# Patient Record
Sex: MALE | Race: WHITE | Employment: UNEMPLOYED | URBAN - METROPOLITAN AREA
[De-identification: names, ages, dates, MRNs, and addresses within clinical notes are randomized per-mention and may not be internally consistent; named-entity substitution may affect disease eponyms.]

---

## 2017-03-17 ENCOUNTER — HOSPITAL ENCOUNTER (OUTPATIENT)
Dept: ENDOSCOPY | Age: 57
Discharge: HOME | End: 2017-03-17
Attending: INTERNAL MEDICINE | Admitting: INTERNAL MEDICINE

## 2017-03-17 VITALS
HEIGHT: 70 IN | RESPIRATION RATE: 18 BRPM | SYSTOLIC BLOOD PRESSURE: 127 MMHG | WEIGHT: 231 LBS | DIASTOLIC BLOOD PRESSURE: 92 MMHG | HEART RATE: 94 BPM | OXYGEN SATURATION: 99 % | BODY MASS INDEX: 33.07 KG/M2 | TEMPERATURE: 97.7 F

## 2017-03-17 LAB
GLUCOSE BLD-MCNC: 91 MG/DL (ref 70–99)
PERFORMED ON: NORMAL

## 2017-03-17 RX ORDER — TAMSULOSIN HYDROCHLORIDE 0.4 MG/1
0.4 CAPSULE ORAL 2 TIMES DAILY
COMMUNITY

## 2019-08-09 ENCOUNTER — HOSPITAL ENCOUNTER (OUTPATIENT)
Age: 59
Setting detail: OUTPATIENT SURGERY
Discharge: HOME | End: 2019-08-09
Attending: INTERNAL MEDICINE | Admitting: INTERNAL MEDICINE
Payer: MEDICAID

## 2019-08-09 ENCOUNTER — ANESTHESIA EVENT (OUTPATIENT)
Dept: ENDOSCOPY | Age: 59
End: 2019-08-09
Payer: MEDICAID

## 2019-08-09 ENCOUNTER — ANESTHESIA (OUTPATIENT)
Dept: ENDOSCOPY | Age: 59
End: 2019-08-09
Payer: MEDICAID

## 2019-08-09 VITALS
HEART RATE: 95 BPM | HEIGHT: 70 IN | OXYGEN SATURATION: 100 % | BODY MASS INDEX: 32.64 KG/M2 | DIASTOLIC BLOOD PRESSURE: 73 MMHG | WEIGHT: 228 LBS | TEMPERATURE: 97.7 F | SYSTOLIC BLOOD PRESSURE: 118 MMHG | RESPIRATION RATE: 16 BRPM

## 2019-08-09 VITALS
OXYGEN SATURATION: 97 % | RESPIRATION RATE: 28 BRPM | DIASTOLIC BLOOD PRESSURE: 70 MMHG | SYSTOLIC BLOOD PRESSURE: 100 MMHG

## 2019-08-09 LAB
GLUCOSE BLD-MCNC: 90 MG/DL (ref 70–99)
PERFORMED ON: NORMAL

## 2019-08-09 PROCEDURE — 7100000011 HC PHASE II RECOVERY - ADDTL 15 MIN: Performed by: INTERNAL MEDICINE

## 2019-08-09 PROCEDURE — 2580000003 HC RX 258: Performed by: NURSE ANESTHETIST, CERTIFIED REGISTERED

## 2019-08-09 PROCEDURE — 3609012400 HC EGD TRANSORAL BIOPSY SINGLE/MULTIPLE: Performed by: INTERNAL MEDICINE

## 2019-08-09 PROCEDURE — 2500000003 HC RX 250 WO HCPCS: Performed by: INTERNAL MEDICINE

## 2019-08-09 PROCEDURE — 3700000000 HC ANESTHESIA ATTENDED CARE: Performed by: INTERNAL MEDICINE

## 2019-08-09 PROCEDURE — 3609010300 HC COLONOSCOPY W/BIOPSY SINGLE/MULTIPLE: Performed by: INTERNAL MEDICINE

## 2019-08-09 PROCEDURE — 7100000010 HC PHASE II RECOVERY - FIRST 15 MIN: Performed by: INTERNAL MEDICINE

## 2019-08-09 PROCEDURE — 2709999900 HC NON-CHARGEABLE SUPPLY: Performed by: INTERNAL MEDICINE

## 2019-08-09 PROCEDURE — 88342 IMHCHEM/IMCYTCHM 1ST ANTB: CPT

## 2019-08-09 PROCEDURE — 88305 TISSUE EXAM BY PATHOLOGIST: CPT

## 2019-08-09 PROCEDURE — 3700000001 HC ADD 15 MINUTES (ANESTHESIA): Performed by: INTERNAL MEDICINE

## 2019-08-09 PROCEDURE — 6360000002 HC RX W HCPCS: Performed by: NURSE ANESTHETIST, CERTIFIED REGISTERED

## 2019-08-09 RX ORDER — PROPOFOL 10 MG/ML
INJECTION, EMULSION INTRAVENOUS CONTINUOUS PRN
Status: DISCONTINUED | OUTPATIENT
Start: 2019-08-09 | End: 2019-08-09 | Stop reason: SDUPTHER

## 2019-08-09 RX ORDER — SODIUM CHLORIDE, SODIUM LACTATE, POTASSIUM CHLORIDE, CALCIUM CHLORIDE 600; 310; 30; 20 MG/100ML; MG/100ML; MG/100ML; MG/100ML
INJECTION, SOLUTION INTRAVENOUS CONTINUOUS PRN
Status: DISCONTINUED | OUTPATIENT
Start: 2019-08-09 | End: 2019-08-09 | Stop reason: SDUPTHER

## 2019-08-09 RX ORDER — PROPOFOL 10 MG/ML
INJECTION, EMULSION INTRAVENOUS PRN
Status: DISCONTINUED | OUTPATIENT
Start: 2019-08-09 | End: 2019-08-09 | Stop reason: SDUPTHER

## 2019-08-09 RX ADMIN — PROPOFOL 40 MG: 10 INJECTION, EMULSION INTRAVENOUS at 10:30

## 2019-08-09 RX ADMIN — SODIUM CHLORIDE, SODIUM LACTATE, POTASSIUM CHLORIDE, AND CALCIUM CHLORIDE: 600; 310; 30; 20 INJECTION, SOLUTION INTRAVENOUS at 10:22

## 2019-08-09 RX ADMIN — PROPOFOL 40 MG: 10 INJECTION, EMULSION INTRAVENOUS at 10:27

## 2019-08-09 RX ADMIN — PROPOFOL 40 MG: 10 INJECTION, EMULSION INTRAVENOUS at 10:35

## 2019-08-09 RX ADMIN — PROPOFOL 40 MG: 10 INJECTION, EMULSION INTRAVENOUS at 10:28

## 2019-08-09 RX ADMIN — PROPOFOL 40 MG: 10 INJECTION, EMULSION INTRAVENOUS at 10:32

## 2019-08-09 RX ADMIN — PROPOFOL 100 MCG/KG/MIN: 10 INJECTION, EMULSION INTRAVENOUS at 10:40

## 2019-08-09 ASSESSMENT — PULMONARY FUNCTION TESTS
PIF_VALUE: 0

## 2019-08-09 ASSESSMENT — LIFESTYLE VARIABLES: SMOKING_STATUS: 0

## 2019-08-09 ASSESSMENT — PAIN - FUNCTIONAL ASSESSMENT
PAIN_FUNCTIONAL_ASSESSMENT: 0-10

## 2019-08-09 NOTE — ANESTHESIA PRE PROCEDURE
consumption: 08/07/19    BMI:   Wt Readings from Last 3 Encounters:   08/09/19 228 lb (103.4 kg)   03/17/17 231 lb (104.8 kg)   04/01/16 217 lb (98.4 kg)     Body mass index is 32.71 kg/m². CBC: No results found for: WBC, RBC, HGB, HCT, MCV, RDW, PLT    CMP: No results found for: NA, K, CL, CO2, BUN, CREATININE, GFRAA, AGRATIO, LABGLOM, GLUCOSE, PROT, CALCIUM, BILITOT, ALKPHOS, AST, ALT    POC Tests:   Recent Labs     08/09/19  0754   POCGLU 90       Coags: No results found for: PROTIME, INR, APTT    HCG (If Applicable): No results found for: PREGTESTUR, PREGSERUM, HCG, HCGQUANT     ABGs: No results found for: PHART, PO2ART, GDE1ZGL, BOO4FAB, BEART, P0NTPVRZ     Type & Screen (If Applicable):  No results found for: LABABO, [address]    Anesthesia Evaluation  Patient summary reviewed  Airway: Mallampati: II  TM distance: >3 FB   Neck ROM: full  Mouth opening: > = 3 FB Dental: normal exam         Pulmonary: breath sounds clear to auscultation      (-) COPD, asthma, sleep apnea and not a current smoker                           Cardiovascular:  Exercise tolerance: good (>4 METS),   (+) hypertension:, hyperlipidemia    (-) valvular problems/murmurs, past MI, CAD, CABG/stent, dysrhythmias,  angina,  CHF, orthopnea, PND and  VASQUES      Rhythm: regular  Rate: normal           Beta Blocker:  Not on Beta Blocker         Neuro/Psych:   (+) TIA,    (-) seizures and CVA            ROS comment: TIA 20 years ago- sx lasted 60 sec though could have been a complex migraine , pt states he was about 450 lb at the time GI/Hepatic/Renal:   (+) bowel prep, morbid obesity     (-) GERD, PUD, hepatitis, liver disease and no renal disease      ROS comment: Took omeprazole this am.   Endo/Other:    (+) Diabetes, : arthritis: OA., no malignancy/cancer. (-) hypothyroidism, hyperthyroidism, no malignancy/cancer               Abdominal:           Vascular:     - DVT and PE.                                     Anesthesia Plan      MAC     ASA 2

## 2020-06-29 LAB
BASOPHILS ABSOLUTE: 0 K/UL (ref 0–0.2)
BASOPHILS RELATIVE PERCENT: 0.5 %
C DIFF TOXIN/ANTIGEN: NORMAL
EOSINOPHILS ABSOLUTE: 0.3 K/UL (ref 0–0.6)
EOSINOPHILS RELATIVE PERCENT: 4.3 %
HCT VFR BLD CALC: 39.5 % (ref 40.5–52.5)
HEMOGLOBIN: 13 G/DL (ref 13.5–17.5)
LYMPHOCYTES ABSOLUTE: 1.5 K/UL (ref 1–5.1)
LYMPHOCYTES RELATIVE PERCENT: 21.8 %
MCH RBC QN AUTO: 33.5 PG (ref 26–34)
MCHC RBC AUTO-ENTMCNC: 32.9 G/DL (ref 31–36)
MCV RBC AUTO: 101.7 FL (ref 80–100)
MONOCYTES ABSOLUTE: 0.7 K/UL (ref 0–1.3)
MONOCYTES RELATIVE PERCENT: 9.8 %
NEUTROPHILS ABSOLUTE: 4.3 K/UL (ref 1.7–7.7)
NEUTROPHILS RELATIVE PERCENT: 63.6 %
OCCULT BLOOD DIAGNOSTIC: NORMAL
PDW BLD-RTO: 14.2 % (ref 12.4–15.4)
PLATELET # BLD: 266 K/UL (ref 135–450)
PMV BLD AUTO: 7.5 FL (ref 5–10.5)
RBC # BLD: 3.88 M/UL (ref 4.2–5.9)
WBC # BLD: 6.8 K/UL (ref 4–11)
WHITE BLOOD CELLS (WBC), STOOL: NORMAL

## 2020-06-30 LAB
A/G RATIO: 2.1 (ref 1.1–2.2)
ALBUMIN SERPL-MCNC: 3.9 G/DL (ref 3.4–5)
ALP BLD-CCNC: 151 U/L (ref 40–129)
ALT SERPL-CCNC: 12 U/L (ref 10–40)
ANION GAP SERPL CALCULATED.3IONS-SCNC: 17 MMOL/L (ref 3–16)
AST SERPL-CCNC: 18 U/L (ref 15–37)
BILIRUB SERPL-MCNC: <0.2 MG/DL (ref 0–1)
BUN BLDV-MCNC: 24 MG/DL (ref 7–20)
CALCIUM SERPL-MCNC: 8 MG/DL (ref 8.3–10.6)
CHLORIDE BLD-SCNC: 111 MMOL/L (ref 99–110)
CO2: 16 MMOL/L (ref 21–32)
CREAT SERPL-MCNC: 1.3 MG/DL (ref 0.9–1.3)
CRYPTOSPORIDIUM ANTIGEN STOOL: NORMAL
E HISTOLYTICA ANTIGEN STOOL: NORMAL
GFR AFRICAN AMERICAN: >60
GFR NON-AFRICAN AMERICAN: 56
GI BACTERIAL PATHOGENS BY PCR: NORMAL
GIARDIA ANTIGEN STOOL: NORMAL
GLOBULIN: 1.9 G/DL
GLUCOSE BLD-MCNC: 79 MG/DL (ref 70–99)
POTASSIUM SERPL-SCNC: 5.6 MMOL/L (ref 3.5–5.1)
SODIUM BLD-SCNC: 144 MMOL/L (ref 136–145)
TOTAL PROTEIN: 5.8 G/DL (ref 6.4–8.2)

## 2022-06-02 LAB
BASOPHILS ABSOLUTE: 0 K/UL (ref 0–0.2)
BASOPHILS RELATIVE PERCENT: 0.4 %
EOSINOPHILS ABSOLUTE: 0.1 K/UL (ref 0–0.6)
EOSINOPHILS RELATIVE PERCENT: 1.2 %
HCT VFR BLD CALC: 38.6 % (ref 40.5–52.5)
HEMOGLOBIN: 12.7 G/DL (ref 13.5–17.5)
LYMPHOCYTES ABSOLUTE: 2.1 K/UL (ref 1–5.1)
LYMPHOCYTES RELATIVE PERCENT: 18.6 %
MCH RBC QN AUTO: 33 PG (ref 26–34)
MCHC RBC AUTO-ENTMCNC: 32.8 G/DL (ref 31–36)
MCV RBC AUTO: 100.5 FL (ref 80–100)
MONOCYTES ABSOLUTE: 0.7 K/UL (ref 0–1.3)
MONOCYTES RELATIVE PERCENT: 6.2 %
NEUTROPHILS ABSOLUTE: 8.4 K/UL (ref 1.7–7.7)
NEUTROPHILS RELATIVE PERCENT: 73.6 %
PDW BLD-RTO: 13.5 % (ref 12.4–15.4)
PLATELET # BLD: 417 K/UL (ref 135–450)
PMV BLD AUTO: 6.9 FL (ref 5–10.5)
RBC # BLD: 3.84 M/UL (ref 4.2–5.9)
WBC # BLD: 11.4 K/UL (ref 4–11)

## 2023-01-25 ENCOUNTER — APPOINTMENT (OUTPATIENT)
Dept: CT IMAGING | Age: 63
DRG: 720 | End: 2023-01-25
Payer: MEDICAID

## 2023-01-25 ENCOUNTER — APPOINTMENT (OUTPATIENT)
Dept: GENERAL RADIOLOGY | Age: 63
DRG: 720 | End: 2023-01-25
Payer: MEDICAID

## 2023-01-25 ENCOUNTER — HOSPITAL ENCOUNTER (INPATIENT)
Age: 63
LOS: 10 days | Discharge: LEFT BEFORE DISCHARGE | DRG: 720 | End: 2023-02-04
Attending: EMERGENCY MEDICINE | Admitting: INTERNAL MEDICINE
Payer: MEDICAID

## 2023-01-25 DIAGNOSIS — R65.20 SEPSIS WITH ENCEPHALOPATHY WITHOUT SEPTIC SHOCK, DUE TO UNSPECIFIED ORGANISM (HCC): ICD-10-CM

## 2023-01-25 DIAGNOSIS — G93.40 SEPSIS WITH ENCEPHALOPATHY WITHOUT SEPTIC SHOCK, DUE TO UNSPECIFIED ORGANISM (HCC): ICD-10-CM

## 2023-01-25 DIAGNOSIS — R41.0 DELIRIUM: Primary | ICD-10-CM

## 2023-01-25 DIAGNOSIS — A41.9 SEPSIS WITH ENCEPHALOPATHY WITHOUT SEPTIC SHOCK, DUE TO UNSPECIFIED ORGANISM (HCC): ICD-10-CM

## 2023-01-25 DIAGNOSIS — N39.0 URINARY TRACT INFECTION WITHOUT HEMATURIA, SITE UNSPECIFIED: ICD-10-CM

## 2023-01-25 LAB
ACETAMINOPHEN LEVEL: <5 UG/ML (ref 10–30)
ALBUMIN SERPL-MCNC: 1.7 G/DL (ref 3.4–5)
ALP BLD-CCNC: 275 U/L (ref 40–129)
ALT SERPL-CCNC: 63 U/L (ref 10–40)
AMMONIA: 92 UMOL/L (ref 16–60)
AMPHETAMINE SCREEN, URINE: ABNORMAL
ANION GAP SERPL CALCULATED.3IONS-SCNC: 13 MMOL/L (ref 3–16)
AST SERPL-CCNC: 84 U/L (ref 15–37)
BACTERIA: ABNORMAL /HPF
BARBITURATE SCREEN URINE: ABNORMAL
BASE EXCESS VENOUS: -0.8 MMOL/L (ref -2–3)
BASOPHILS ABSOLUTE: 0 K/UL (ref 0–0.2)
BASOPHILS RELATIVE PERCENT: 0.2 %
BENZODIAZEPINE SCREEN, URINE: ABNORMAL
BILIRUB SERPL-MCNC: 1.5 MG/DL (ref 0–1)
BILIRUBIN DIRECT: 1 MG/DL (ref 0–0.3)
BILIRUBIN URINE: ABNORMAL
BILIRUBIN, INDIRECT: 0.5 MG/DL (ref 0–1)
BLOOD, URINE: ABNORMAL
BUN BLDV-MCNC: 32 MG/DL (ref 7–20)
CALCIUM SERPL-MCNC: 7 MG/DL (ref 8.3–10.6)
CANNABINOID SCREEN URINE: ABNORMAL
CARBOXYHEMOGLOBIN: 0.7 % (ref 0–1.5)
CHLORIDE BLD-SCNC: 102 MMOL/L (ref 99–110)
CLARITY: CLEAR
CO2: 21 MMOL/L (ref 21–32)
COCAINE METABOLITE SCREEN URINE: ABNORMAL
COLOR: YELLOW
CREAT SERPL-MCNC: 1.8 MG/DL (ref 0.8–1.3)
EOSINOPHILS ABSOLUTE: 0 K/UL (ref 0–0.6)
EOSINOPHILS RELATIVE PERCENT: 0 %
EPITHELIAL CELLS, UA: ABNORMAL /HPF (ref 0–5)
ETHANOL: NORMAL MG/DL (ref 0–0.08)
FENTANYL SCREEN, URINE: ABNORMAL
FOLATE: 18.53 NG/ML (ref 4.78–24.2)
GFR SERPL CREATININE-BSD FRML MDRD: 42 ML/MIN/{1.73_M2}
GLUCOSE BLD-MCNC: 84 MG/DL (ref 70–99)
GLUCOSE URINE: NEGATIVE MG/DL
HAV IGM SER IA-ACNC: NORMAL
HCO3 VENOUS: 23 MMOL/L (ref 24–28)
HCT VFR BLD CALC: 27.6 % (ref 40.5–52.5)
HEMOGLOBIN, VEN, REDUCED: 15.5 %
HEMOGLOBIN: 8.8 G/DL (ref 13.5–17.5)
HEPATITIS B CORE IGM ANTIBODY: NORMAL
HEPATITIS B SURFACE ANTIGEN INTERPRETATION: NORMAL
HEPATITIS C ANTIBODY INTERPRETATION: NORMAL
INR BLD: 2.03 (ref 0.87–1.14)
KETONES, URINE: ABNORMAL MG/DL
LACTIC ACID: 1.4 MMOL/L (ref 0.4–2)
LACTIC ACID: 2.4 MMOL/L (ref 0.4–2)
LEUKOCYTE ESTERASE, URINE: ABNORMAL
LIPASE: 4 U/L (ref 13–60)
LV EF: 63 %
LVEF MODALITY: NORMAL
LYMPHOCYTES ABSOLUTE: 2.5 K/UL (ref 1–5.1)
LYMPHOCYTES RELATIVE PERCENT: 14.1 %
Lab: ABNORMAL
MCH RBC QN AUTO: 32.4 PG (ref 26–34)
MCHC RBC AUTO-ENTMCNC: 31.9 G/DL (ref 31–36)
MCV RBC AUTO: 101.5 FL (ref 80–100)
METHADONE SCREEN, URINE: ABNORMAL
METHEMOGLOBIN VENOUS: 2.2 % (ref 0–1.5)
MICROSCOPIC EXAMINATION: YES
MONOCYTES ABSOLUTE: 0.8 K/UL (ref 0–1.3)
MONOCYTES RELATIVE PERCENT: 4.3 %
NEUTROPHILS ABSOLUTE: 14.6 K/UL (ref 1.7–7.7)
NEUTROPHILS RELATIVE PERCENT: 81.4 %
NITRITE, URINE: NEGATIVE
O2 SAT, VEN: 84 %
OPIATE SCREEN URINE: ABNORMAL
OXYCODONE URINE: POSITIVE
PCO2, VEN: 33.6 MMHG (ref 41–51)
PDW BLD-RTO: 14.7 % (ref 12.4–15.4)
PH UA: 6
PH UA: 6 (ref 5–8)
PH VENOUS: 7.44 (ref 7.35–7.45)
PHENCYCLIDINE SCREEN URINE: ABNORMAL
PLATELET # BLD: 310 K/UL (ref 135–450)
PMV BLD AUTO: 7.9 FL (ref 5–10.5)
PO2, VEN: 48.8 MMHG (ref 25–40)
POTASSIUM REFLEX MAGNESIUM: 3.7 MMOL/L (ref 3.5–5.1)
PRO-BNP: 520 PG/ML (ref 0–124)
PROCALCITONIN: 43.88 NG/ML (ref 0–0.15)
PROTEIN UA: 30 MG/DL
PROTHROMBIN TIME: 23 SEC (ref 11.7–14.5)
RBC # BLD: 2.72 M/UL (ref 4.2–5.9)
RBC UA: ABNORMAL /HPF (ref 0–4)
RENAL EPITHELIAL, UA: ABNORMAL /HPF (ref 0–1)
SALICYLATE, SERUM: <0.3 MG/DL (ref 15–30)
SODIUM BLD-SCNC: 136 MMOL/L (ref 136–145)
SPECIFIC GRAVITY UA: 1.02 (ref 1–1.03)
TCO2 CALC VENOUS: 24 MMOL/L
TOTAL PROTEIN: 4.3 G/DL (ref 6.4–8.2)
TROPONIN: 0.13 NG/ML
TROPONIN: 0.13 NG/ML
URINE REFLEX TO CULTURE: YES
URINE TYPE: ABNORMAL
UROBILINOGEN, URINE: 1 E.U./DL
VITAMIN B-12: >2000 PG/ML (ref 211–911)
WBC # BLD: 18 K/UL (ref 4–11)
WBC UA: >100 /HPF (ref 0–5)

## 2023-01-25 PROCEDURE — 6360000002 HC RX W HCPCS: Performed by: STUDENT IN AN ORGANIZED HEALTH CARE EDUCATION/TRAINING PROGRAM

## 2023-01-25 PROCEDURE — 6360000002 HC RX W HCPCS: Performed by: EMERGENCY MEDICINE

## 2023-01-25 PROCEDURE — 93005 ELECTROCARDIOGRAM TRACING: CPT

## 2023-01-25 PROCEDURE — 83690 ASSAY OF LIPASE: CPT

## 2023-01-25 PROCEDURE — 83605 ASSAY OF LACTIC ACID: CPT

## 2023-01-25 PROCEDURE — 71045 X-RAY EXAM CHEST 1 VIEW: CPT

## 2023-01-25 PROCEDURE — 82746 ASSAY OF FOLIC ACID SERUM: CPT

## 2023-01-25 PROCEDURE — 2700000000 HC OXYGEN THERAPY PER DAY

## 2023-01-25 PROCEDURE — 82140 ASSAY OF AMMONIA: CPT

## 2023-01-25 PROCEDURE — 36415 COLL VENOUS BLD VENIPUNCTURE: CPT

## 2023-01-25 PROCEDURE — 80074 ACUTE HEPATITIS PANEL: CPT

## 2023-01-25 PROCEDURE — 96365 THER/PROPH/DIAG IV INF INIT: CPT

## 2023-01-25 PROCEDURE — 82607 VITAMIN B-12: CPT

## 2023-01-25 PROCEDURE — 6360000002 HC RX W HCPCS: Performed by: INTERNAL MEDICINE

## 2023-01-25 PROCEDURE — 87088 URINE BACTERIA CULTURE: CPT

## 2023-01-25 PROCEDURE — 86038 ANTINUCLEAR ANTIBODIES: CPT

## 2023-01-25 PROCEDURE — 2500000003 HC RX 250 WO HCPCS: Performed by: STUDENT IN AN ORGANIZED HEALTH CARE EDUCATION/TRAINING PROGRAM

## 2023-01-25 PROCEDURE — 84145 PROCALCITONIN (PCT): CPT

## 2023-01-25 PROCEDURE — 85025 COMPLETE CBC W/AUTO DIFF WBC: CPT

## 2023-01-25 PROCEDURE — 99285 EMERGENCY DEPT VISIT HI MDM: CPT

## 2023-01-25 PROCEDURE — 86015 ACTIN ANTIBODY EACH: CPT

## 2023-01-25 PROCEDURE — 80076 HEPATIC FUNCTION PANEL: CPT

## 2023-01-25 PROCEDURE — 80307 DRUG TEST PRSMV CHEM ANLYZR: CPT

## 2023-01-25 PROCEDURE — 82077 ASSAY SPEC XCP UR&BREATH IA: CPT

## 2023-01-25 PROCEDURE — 99223 1ST HOSP IP/OBS HIGH 75: CPT | Performed by: INTERNAL MEDICINE

## 2023-01-25 PROCEDURE — 2580000003 HC RX 258: Performed by: INTERNAL MEDICINE

## 2023-01-25 PROCEDURE — 70450 CT HEAD/BRAIN W/O DYE: CPT

## 2023-01-25 PROCEDURE — A4216 STERILE WATER/SALINE, 10 ML: HCPCS

## 2023-01-25 PROCEDURE — 94761 N-INVAS EAR/PLS OXIMETRY MLT: CPT

## 2023-01-25 PROCEDURE — 84484 ASSAY OF TROPONIN QUANT: CPT

## 2023-01-25 PROCEDURE — 87040 BLOOD CULTURE FOR BACTERIA: CPT

## 2023-01-25 PROCEDURE — 87086 URINE CULTURE/COLONY COUNT: CPT

## 2023-01-25 PROCEDURE — 93306 TTE W/DOPPLER COMPLETE: CPT

## 2023-01-25 PROCEDURE — 80143 DRUG ASSAY ACETAMINOPHEN: CPT

## 2023-01-25 PROCEDURE — 81001 URINALYSIS AUTO W/SCOPE: CPT

## 2023-01-25 PROCEDURE — 2580000003 HC RX 258: Performed by: EMERGENCY MEDICINE

## 2023-01-25 PROCEDURE — 2000000000 HC ICU R&B

## 2023-01-25 PROCEDURE — 80179 DRUG ASSAY SALICYLATE: CPT

## 2023-01-25 PROCEDURE — 2580000003 HC RX 258

## 2023-01-25 PROCEDURE — 2500000003 HC RX 250 WO HCPCS

## 2023-01-25 PROCEDURE — 87150 DNA/RNA AMPLIFIED PROBE: CPT

## 2023-01-25 PROCEDURE — 85610 PROTHROMBIN TIME: CPT

## 2023-01-25 PROCEDURE — 82803 BLOOD GASES ANY COMBINATION: CPT

## 2023-01-25 PROCEDURE — 2580000003 HC RX 258: Performed by: STUDENT IN AN ORGANIZED HEALTH CARE EDUCATION/TRAINING PROGRAM

## 2023-01-25 PROCEDURE — 87186 SC STD MICRODIL/AGAR DIL: CPT

## 2023-01-25 PROCEDURE — 83880 ASSAY OF NATRIURETIC PEPTIDE: CPT

## 2023-01-25 PROCEDURE — 51702 INSERT TEMP BLADDER CATH: CPT

## 2023-01-25 PROCEDURE — 96375 TX/PRO/DX INJ NEW DRUG ADDON: CPT

## 2023-01-25 PROCEDURE — 80048 BASIC METABOLIC PNL TOTAL CA: CPT

## 2023-01-25 RX ORDER — SODIUM CHLORIDE 9 MG/ML
INJECTION, SOLUTION INTRAVENOUS PRN
Status: DISCONTINUED | OUTPATIENT
Start: 2023-01-25 | End: 2023-02-04 | Stop reason: HOSPADM

## 2023-01-25 RX ORDER — POLYETHYLENE GLYCOL 3350 17 G/17G
17 POWDER, FOR SOLUTION ORAL DAILY PRN
Status: DISCONTINUED | OUTPATIENT
Start: 2023-01-25 | End: 2023-02-04 | Stop reason: HOSPADM

## 2023-01-25 RX ORDER — NALOXONE HYDROCHLORIDE 0.4 MG/ML
0.4 INJECTION, SOLUTION INTRAMUSCULAR; INTRAVENOUS; SUBCUTANEOUS ONCE
Status: COMPLETED | OUTPATIENT
Start: 2023-01-25 | End: 2023-01-25

## 2023-01-25 RX ORDER — FUROSEMIDE 10 MG/ML
20 INJECTION INTRAMUSCULAR; INTRAVENOUS ONCE
Status: DISCONTINUED | OUTPATIENT
Start: 2023-01-25 | End: 2023-01-25

## 2023-01-25 RX ORDER — TIZANIDINE 4 MG/1
4 TABLET ORAL EVERY 8 HOURS PRN
COMMUNITY

## 2023-01-25 RX ORDER — DOXEPIN HYDROCHLORIDE 50 MG/1
150 CAPSULE ORAL NIGHTLY
COMMUNITY

## 2023-01-25 RX ORDER — ACETAMINOPHEN 325 MG/1
650 TABLET ORAL EVERY 6 HOURS PRN
Status: DISCONTINUED | OUTPATIENT
Start: 2023-01-25 | End: 2023-02-04 | Stop reason: HOSPADM

## 2023-01-25 RX ORDER — SODIUM CHLORIDE, SODIUM LACTATE, POTASSIUM CHLORIDE, AND CALCIUM CHLORIDE .6; .31; .03; .02 G/100ML; G/100ML; G/100ML; G/100ML
1000 INJECTION, SOLUTION INTRAVENOUS ONCE
Status: COMPLETED | OUTPATIENT
Start: 2023-01-25 | End: 2023-01-25

## 2023-01-25 RX ORDER — ACETAMINOPHEN 650 MG/1
650 SUPPOSITORY RECTAL EVERY 6 HOURS PRN
Status: DISCONTINUED | OUTPATIENT
Start: 2023-01-25 | End: 2023-02-04 | Stop reason: HOSPADM

## 2023-01-25 RX ORDER — ONDANSETRON 4 MG/1
4 TABLET, ORALLY DISINTEGRATING ORAL EVERY 8 HOURS PRN
Status: DISCONTINUED | OUTPATIENT
Start: 2023-01-25 | End: 2023-01-26 | Stop reason: SDUPTHER

## 2023-01-25 RX ORDER — SODIUM CHLORIDE 0.9 % (FLUSH) 0.9 %
5-40 SYRINGE (ML) INJECTION PRN
Status: DISCONTINUED | OUTPATIENT
Start: 2023-01-25 | End: 2023-02-04 | Stop reason: HOSPADM

## 2023-01-25 RX ORDER — SODIUM CHLORIDE, SODIUM LACTATE, POTASSIUM CHLORIDE, AND CALCIUM CHLORIDE .6; .31; .03; .02 G/100ML; G/100ML; G/100ML; G/100ML
1391 INJECTION, SOLUTION INTRAVENOUS ONCE
Status: COMPLETED | OUTPATIENT
Start: 2023-01-25 | End: 2023-01-25

## 2023-01-25 RX ORDER — SODIUM CHLORIDE 9 MG/ML
INJECTION, SOLUTION INTRAVENOUS CONTINUOUS
Status: DISCONTINUED | OUTPATIENT
Start: 2023-01-25 | End: 2023-01-25

## 2023-01-25 RX ORDER — ONDANSETRON 2 MG/ML
4 INJECTION INTRAMUSCULAR; INTRAVENOUS EVERY 6 HOURS PRN
Status: DISCONTINUED | OUTPATIENT
Start: 2023-01-25 | End: 2023-01-26 | Stop reason: SDUPTHER

## 2023-01-25 RX ORDER — CLONAZEPAM 1 MG/1
1 TABLET ORAL 2 TIMES DAILY PRN
COMMUNITY

## 2023-01-25 RX ORDER — PANTOPRAZOLE SODIUM 40 MG/1
40 TABLET, DELAYED RELEASE ORAL DAILY
COMMUNITY

## 2023-01-25 RX ORDER — LATANOPROST 50 UG/ML
1 SOLUTION/ DROPS OPHTHALMIC NIGHTLY
COMMUNITY
Start: 2022-11-06

## 2023-01-25 RX ORDER — FUROSEMIDE 20 MG/1
20 TABLET ORAL DAILY
COMMUNITY
Start: 2023-01-04

## 2023-01-25 RX ORDER — SODIUM CHLORIDE 0.9 % (FLUSH) 0.9 %
5-40 SYRINGE (ML) INJECTION EVERY 12 HOURS SCHEDULED
Status: DISCONTINUED | OUTPATIENT
Start: 2023-01-25 | End: 2023-02-04 | Stop reason: HOSPADM

## 2023-01-25 RX ADMIN — MEROPENEM 1000 MG: 1 INJECTION, POWDER, FOR SOLUTION INTRAVENOUS at 16:54

## 2023-01-25 RX ADMIN — SODIUM CHLORIDE, POTASSIUM CHLORIDE, SODIUM LACTATE AND CALCIUM CHLORIDE 1391 ML: 600; 310; 30; 20 INJECTION, SOLUTION INTRAVENOUS at 14:00

## 2023-01-25 RX ADMIN — NOREPINEPHRINE BITARTRATE 5 MCG/MIN: 1 INJECTION, SOLUTION, CONCENTRATE INTRAVENOUS at 19:22

## 2023-01-25 RX ADMIN — SODIUM CHLORIDE: 9 INJECTION, SOLUTION INTRAVENOUS at 16:50

## 2023-01-25 RX ADMIN — NALOXONE HYDROCHLORIDE 0.4 MG: 0.4 INJECTION, SOLUTION INTRAMUSCULAR; INTRAVENOUS; SUBCUTANEOUS at 14:21

## 2023-01-25 RX ADMIN — VANCOMYCIN HYDROCHLORIDE 2000 MG: 10 INJECTION, POWDER, LYOPHILIZED, FOR SOLUTION INTRAVENOUS at 14:03

## 2023-01-25 RX ADMIN — SODIUM CHLORIDE, POTASSIUM CHLORIDE, SODIUM LACTATE AND CALCIUM CHLORIDE 1000 ML: 600; 310; 30; 20 INJECTION, SOLUTION INTRAVENOUS at 12:49

## 2023-01-25 RX ADMIN — PIPERACILLIN AND TAZOBACTAM 4500 MG: 4; .5 INJECTION, POWDER, LYOPHILIZED, FOR SOLUTION INTRAVENOUS at 13:49

## 2023-01-25 RX ADMIN — SODIUM CHLORIDE, PRESERVATIVE FREE 20 MG: 5 INJECTION INTRAVENOUS at 16:52

## 2023-01-25 RX ADMIN — AZITHROMYCIN DIHYDRATE 500 MG: 500 INJECTION, POWDER, LYOPHILIZED, FOR SOLUTION INTRAVENOUS at 21:01

## 2023-01-25 NOTE — PROGRESS NOTES
Clinical Pharmacy Progress Note    Vancomycin - Management by Pharmacy    Consult Date(s): 1/25/23  Consulting Provider(s): Dr.Alexander Washburn    Assessment / Plan  Sepsis of unknown - Vancomycin  Concurrent Antimicrobials: Merrem  Day of Vanc Therapy / Ordered Duration: 7 days  Current Dosing Method: Bayesian-Guided AUC Dosing  Therapeutic Goal: -600 mg/L*hr  Current Dose / Plan:   2000 mg LD, 1250 mg every 24 hours  Estimated AUC of 560mg/L.hr   Estimated trough of 17.6mg/L  Will continue to monitor clinical condition and make adjustments to regimen as appropriate. Thank you for consulting Pharmacy! Joseph Graves, PharmD  Main Pharmacy: [id number]        Subjective/Objective:   Leonila Sanchez is a 58 y.o. male who is admitted with Sepsis . Pharmacy is consulted to dose vancomycin. Ht Readings from Last 1 Encounters:   01/25/23 6' (1.829 m)     Wt Readings from Last 1 Encounters:   01/25/23 175 lb 11.3 oz (79.7 kg)     Current & Prior Antimicrobial Regimen(s):  Merrem     Level(s) / Doses:    Date Time Dose Type of Level / Level Interpretation                 Note: Serum levels collected for AUC-based dosing may be high if collected in close proximity to the dose administered. This is not necessarily indicative of toxicity. Cultures & Sensitivities:    Date Site Micro Susceptibility / Result                 Recent Labs     01/25/23  1258   CREATININE 1.8*   BUN 32*   WBC 18.0*       Estimated Creatinine Clearance: 47 mL/min (A) (based on SCr of 1.8 mg/dL (H)). Additional Lab Values / Findings of Note:    No results for input(s): PROCAL in the last 72 hours.

## 2023-01-25 NOTE — PROGRESS NOTES
4 Eyes Admission Assessment     I agree as the admission nurse that 2 RN's have performed a thorough Head to Toe Skin Assessment on the patient. ALL assessment sites listed below have been assessed on admission. Areas assessed by both nurses: Jeffry Crooked and [employer]   [x]   Head, Face, and Ears   [x]   Shoulders, Back, and Chest  [x]   Arms, Elbows, and Hands   [x]   Coccyx, Sacrum, and Ischium  [x]   Legs, Feet, and Heels        Does the Patient have Skin Breakdown? Yes a wound was noted on the Admission Assessment and an LDA was Initiated documentation include the Bridgett-wound, Wound Assessment, Measurements, Dressing Treatment, Drainage, and Color\",       Pt admitted with multiple wounds. Bilateral wounds which or necrotic to unstageable on feet, scattered abrasions and tears to BLE and BUE, discolration to left flank and hip. Stage 4 on coccyx.      Matthew Prevention initiated:  Yes   Wound Care Orders initiated:  Yes      [id number]  nurse consulted for Pressure Injury (Stage 3,4, Unstageable, DTI, NWPT, and Complex wounds) or Matthew score 18 or lower:  Yes      Nurse 1 eSignature: Electronically signed by Vitaly Rinaldi RN on 1/25/23 at 3:20 PM EST    **SHARE this note so that the co-signing nurse is able to place an eSignature**    Nurse 2 eSignature: Antoinette Garrett RN

## 2023-01-25 NOTE — H&P
ICU HISTORY AND PHYSICAL       Hospital Day: 1  ICU Day: 1                                                         Code:No Order  Admit Date: 1/25/2023  PCP: PENELOPE CLARK MD                                  CC: AMS    HISTORY OF PRESENT ILLNESS:   Mr. Mcnally is a 62 y.o. M with PMH of chronic back pain, pulmonary emboli on eliquis, CKD, gallstones, DM, HTN, HLD, TIA, stroke, pancreatitis, gastric bypass surgery who presents with altered mental status from home. He was last known normal 13 hours prior to admission (11 PM 1/25). Pt. Cannot contribute history. Pt. Was found in home by person in house who found him.    I spoke with patient's  and niece, who are both very close with him.  They state that the patient should be full code however patient has noted in the past that he does not want to be intubated for any extended period of time.   states the patient does not drink alcohol, does not smoke, does not do any illicit drugs.  Patient's  and niece both state that the patient has been declining significantly in the past 6 months.  His memory has started to worsen as well as his ability for activities of daily living.    At  in the end of Nov both blood Cx show staph epidermidis susceptible to vanc and bactrim.  Urine Cx + for E. Coli and susceptible to multiple mediations. During same visit, the patient was found to have UTI, L lower extremity cellulitis, end organ damage shock and sacral decubitus ulcer.     Per chart review, Tizanidine, Percocet, Gabapentin are all prescribed for low back pain. Per note from 1/19 at  health; pain is worse and he wants to decrease the dose of meds so that the patient can take it more frequently. Pt. Also takes Seroquel, clonopin, and doxepin. Pt. Also takes tamsulosin for BPH.    In the ED vanc and zosyn were given. Narcan was also given. Pending acetaminophen and salicylate levels. During same visit, the patient was found to have UTI, L lower extremity  cellulitis, end organ damage shock and sacral decubitus ulcer. PAST HISTORY:     Past Medical History:   Diagnosis Date    Bradycardia     Depression     Diabetes mellitus ([facility] [address]     no meds    Hyperlipidemia     Hypertension     Osteoarthritis of lumbar spine     Primary osteoarthritis of lumbar spine     Unspecified cerebral artery occlusion with cerebral infarction 2003    TIA    Unspecified cerebral artery occlusion with cerebral infarction        Past Surgical History:   Procedure Laterality Date    BACK SURGERY      COLONOSCOPY N/A 8/9/2019    COLONOSCOPY WITH BIOPSY performed by Albania Zhao MD at [facility],  [address]      left knee    TONSILLECTOMY      UPPER GASTROINTESTINAL ENDOSCOPY N/A 8/9/2019    EGD BIOPSY performed by Albania Zhao MD at [facility]:   The patient lives at    Alcohol:  Illicit drugs: no use  Tobacco:      Family History:  No family history on file. MEDICATIONS:     No current facility-administered medications on file prior to encounter. Current Outpatient Medications on File Prior to Encounter   Medication Sig Dispense Refill    apixaban (ELIQUIS) 5 MG TABS tablet Take 5 mg by mouth 2 times daily      clonazePAM (KLONOPIN) 1 MG tablet Take 1 mg by mouth 2 times daily as needed. tamsulosin (FLOMAX) 0.4 MG capsule Take 0.4 mg by mouth 2 times daily      losartan (COZAAR) 50 MG tablet Take 50 mg by mouth daily. senna (SENOKOT) 8.6 MG tablet Take 1 tablet by mouth as needed for Constipation. fluticasone (FLONASE) 50 MCG/ACT nasal spray 1 spray by Nasal route daily. docusate sodium (COLACE) 100 MG capsule Take 100 mg by mouth.      gabapentin (NEURONTIN) 600 MG tablet Take 600 mg by mouth 3 times daily. oxyCODONE-acetaminophen (PERCOCET) 7.5-325 MG per tablet Take 1 tablet by mouth every 12 hours as needed for Pain. spironolactone (ALDACTONE) 25 MG tablet Take 25 mg by mouth daily. Magnesium 500 MG CAPS Take  by mouth daily. metoprolol (TOPROL-XL) 100 MG XL tablet Take 100 mg by mouth daily. omeprazole (PRILOSEC) 40 MG capsule Take 40 mg by mouth daily. zinc 50 MG CAPS Take  by mouth daily. pravastatin (PRAVACHOL) 10 MG tablet Take 10 mg by mouth daily. folic acid (FOLVITE) 1 MG tablet Take 1 mg by mouth daily. montelukast (SINGULAIR) 10 MG tablet Take 10 mg by mouth nightly. loratadine (CLARITIN) 10 MG tablet Take 10 mg by mouth daily. therapeutic multivitamin-minerals (THERAGRAN-M) tablet Take 1 tablet by mouth daily. Scheduled Meds:   piperacillin-tazobactam  4,500 mg IntraVENous Once    vancomycin  25 mg/kg IntraVENous Once    lactated ringers bolus  1,391 mL IntraVENous Once      Continuous Infusions:  PRN Meds:    Allergies: No Known Allergies    REVIEW OF SYSTEMS:       History obtained from chart review and the patient    Review of Systems   Reason unable to perform ROS: Poor mentation. PHYSICAL EXAM:       Vitals: BP (!) 99/59   Pulse (!) 143   Resp 25   Ht 6' (1.829 m)   Wt 175 lb 11.3 oz (79.7 kg)   SpO2 100%   BMI 23.83 kg/m²     I/O:  No intake or output data in the 24 hours ending 01/25/23 1414  No intake/output data recorded. No intake/output data recorded. Physical Examination:     Physical Exam  Constitutional:       Appearance: He is ill-appearing and toxic-appearing. Comments: Appears Grossly jaundiced   HENT:      Head: Normocephalic. Right Ear: External ear normal.      Left Ear: External ear normal.   Cardiovascular:      Rate and Rhythm: Regular rhythm. Tachycardia present. Pulses: Normal pulses. Heart sounds: Normal heart sounds. Pulmonary:      Breath sounds: Normal breath sounds. Abdominal:      General: There is distension. Palpations: Abdomen is soft. Tenderness: There is no abdominal tenderness. There is no guarding.    Musculoskeletal:         General: Swelling and signs of injury present. Right lower leg: Edema present. Left lower leg: Edema present. Comments: Anasarca  Sacral Decubitis Ulcer R buttock   Skin:     Coloration: Skin is jaundiced. Findings: Bruising and lesion present. Comments: Skin lesions on both feet with peeling skin, skin lesion L knee, surgery scars on lower abdomen. Neurological:      Comments: Minimally responsive         Access:   -Central Access Day #:  0                                   -Peripheral Access Day#:1  -Arterial line Day#: 0                                   Joseph Day#:1  NGT Day#:  0                                             ETT Day#: 0  Vent Settings:    / / /     No results for input(s): PHART, CPJ0AMV, PO2ART in the last 72 hours. DATA:       Labs:  CBC:   Recent Labs     01/25/23  1258   WBC 18.0*   HGB 8.8*   HCT 27.6*          BMP:   Recent Labs     01/25/23  1258      K 3.7      CO2 21   BUN 32*   CREATININE 1.8*   GLUCOSE 84     LFT's:   Recent Labs     01/25/23  1258   AST 84*   ALT 63*   BILITOT 1.5*   ALKPHOS 275*     Troponin:   Recent Labs     01/25/23  1258   TROPONINI 0.13*     BNP:No results for input(s): BNP in the last 72 hours. ABGs: No results for input(s): PHART, OXQ8ZQL, PO2ART in the last 72 hours. INR:   Recent Labs     01/25/23  1258   INR 2.03*       U/A:  Recent Labs     01/25/23  1316   COLORU Yellow   PHUR 6.0  6.0   WBCUA >100*   RBCUA 0-2   BACTERIA 3+*   CLARITYU Clear   SPECGRAV 1.025   LEUKOCYTESUR MODERATE*   UROBILINOGEN 1.0   BILIRUBINUR SMALL*   BLOODU MODERATE*   GLUCOSEU Negative       CT Head W/O Contrast   Final Result      No evidence of acute intracranial abnormality. XR CHEST PORTABLE   Final Result   1. Abnormal airspace disease throughout the left hemithorax.    2. Follow-up is recommended with specific attention to obtaining a upright PA and lateral chest          EKG:   Echo:  Micro:     ASSESSMENT AND PLAN: Mr. Keven Thurston is a 58 y.o. M with PMH of chronic back pain, pulmonary emboli on eliquis, CKD, gallstones, DM, HTN, HLD, TIA, stroke, pancreatitis, gastric bypass surgery who presents with altered mental status from home. Altered Mental Status 2/2 Overdose, Acute Liver Failure, and/or, Septic Shock  He was last known normal 13 hours prior to admission (11 PM 1/25). Home Medications include Seroquel, clonopin, and doxepin  Pt. Hypotensive and tachycardic on arrival.  CT Head shows no acute abnormality. U/A + for Leukocyte esterase and over 100 WBC. Received vanc and zosyn in ED  F/u Urine Cx  F/u on VBG  Fluids running at 125/ml/hr  Ordered Vanc and Merrem  Lactic Acid 2.4- continue to trend  Hold anticoagulation for now. Monitor INR and consider starting  heparin in AM (01/26)    Acute Hypoxic Respiratory Failure  Requires High Flow Nasal Cannula  CXR shows abnormal airspace disease on L nesha-thorax  Possible fluid overload  F/u BNP for baseline and monitor for fluid status  F/u Procal    Acute Liver Failure  ALK Phos high at 275  ALT high at 63;  AST elevated at 84  Albumin Low at 1.7  Ammonia levels elevated 92  Elevated direct and indirect bilirubin 1, and 1.5  Macrocytic Anemia  Trending LFT's  F/u hepatitis panel  F/u Ultrasound with doppler  F/u anti-SM, anti-BENJAMÍN antibodies. Possible overdose  Received narcan on arrival with somewhat improved mentation  UDS + for oxy, however, patient is prescribed percocet for chronic back pain.   -Negative UDS for all other levels. F/u on acetaminophen level  Salicylate levels w/n/l  F/u on ethanol levels    Troponemia  Suspect type 2 demand ischemia  Elevated on arrival at 0.13  F/u on troponin levels (trending)    Pericardial Effusion  U/S heart shows pericardial effusion  -History of Large free-flowing Pericardial effusion with prominent epicardial fat with LVEF 60-65% from December at [facility]. That visit shows no change from previous imaging.   EKG shows low voltage complexes    ALISON on CKD stage 3  Cr of 1.8 on admission  Cr of 1.3 baseline  Fluids on board for septic shock  Continue to monitor    Chronic Problems  Hx of PE - eliquis  MDD - Klonopin        Code Status:No Order  FEN: NPO  PPX:  Famotidine  DISPO: TBD    This patient has been staffed and discussed with Va Hsu MD  -----------------------------  Sofia Hernández MD, PGY-1  1/25/2023  2:14 PM

## 2023-01-25 NOTE — CONSULTS
ICU Consult Note     PCP: Misael Monge MD    Code:No Order  Admit Date: 1/25/2023  Vent Settings:    / / /     History of present illness:      CC: AMS    Patient is a 58 y.o. male with a PMHx of chronic back pain, pulmonary emboli on eliquis, CKD, s/p g, DM, HTN, HLD, TIA, stroke, pancreatitis, gastric bypass surgery presenting with altered mentation from home. He was last known normal 13 hours prior to admission. He cannot contribute to history. He has had recent admission at [facility] in 11-12/2022 for E coli urosepsis,  bilateral PE. He has not started any new medications. Stays in his recliner at home. He has had clinical decline over the past 6 months per family at bedside. He received narcan with minimal improvement. ROS: Review of Systems - can not obtain 2/2 AMS. Past Medical / Surgical History:    Past Medical History:   Diagnosis Date    Bradycardia     Depression     Diabetes mellitus ([facility] [address]     no meds    Hyperlipidemia     Hypertension     Osteoarthritis of lumbar spine     Primary osteoarthritis of lumbar spine     Unspecified cerebral artery occlusion with cerebral infarction 2003    TIA    Unspecified cerebral artery occlusion with cerebral infarction      Past Surgical History:   Procedure Laterality Date    BACK SURGERY      COLONOSCOPY N/A 8/9/2019    COLONOSCOPY WITH BIOPSY performed by Price Krishnan MD at [address] [facility],  [address]      left knee    TONSILLECTOMY      UPPER GASTROINTESTINAL ENDOSCOPY N/A 8/9/2019    EGD BIOPSY performed by Price Krishnan MD at [facility] ENDOSCOPY       Medications Prior to Admission:    No current facility-administered medications on file prior to encounter. Current Outpatient Medications on File Prior to Encounter   Medication Sig Dispense Refill    apixaban (ELIQUIS) 5 MG TABS tablet Take 5 mg by mouth 2 times daily      clonazePAM (KLONOPIN) 1 MG tablet Take 1 mg by mouth 2 times daily as needed.       doxepin (SINEQUAN) 50 MG capsule Take 150 mg by mouth nightly      furosemide (LASIX) 20 MG tablet Take 20 mg by mouth daily      latanoprost (XALATAN) 0.005 % ophthalmic solution Place 1 drop into both eyes nightly      pantoprazole (PROTONIX) 40 MG tablet Take 40 mg by mouth daily      tiZANidine (ZANAFLEX) 4 MG tablet Take 4 mg by mouth every 8 hours as needed (muscle spasms)      tamsulosin (FLOMAX) 0.4 MG capsule Take 0.4 mg by mouth 2 times daily      losartan (COZAAR) 50 MG tablet Take 50 mg by mouth daily. senna (SENOKOT) 8.6 MG tablet Take 1 tablet by mouth as needed for Constipation. docusate sodium (COLACE) 100 MG capsule Take 100 mg by mouth.      gabapentin (NEURONTIN) 800 MG tablet Take 800 mg by mouth 3 times daily. oxyCODONE-acetaminophen (PERCOCET) 7.5-325 MG per tablet Take 1 tablet by mouth every 12 hours as needed for Pain.      metoprolol succinate (TOPROL XL) 50 MG extended release tablet Take 50 mg by mouth daily      pravastatin (PRAVACHOL) 10 MG tablet Take 10 mg by mouth daily. folic acid (FOLVITE) 1 MG tablet Take 1 mg by mouth daily. montelukast (SINGULAIR) 10 MG tablet Take 10 mg by mouth nightly. loratadine (CLARITIN) 10 MG tablet Take 10 mg by mouth daily. therapeutic multivitamin-minerals (THERAGRAN-M) tablet Take 1 tablet by mouth daily. Allergies:  Patient has no known allergies. Social History:   TOBACCO:   reports that he has never smoked. He does not have any smokeless tobacco history on file. ETOH:   reports no history of alcohol use. [facility]    Family History:   No family history on file. Vital/I&O/Physical examination:   VS:  BP 91/61   Pulse (!) 143   Resp 25   Ht 6' (1.829 m)   Wt 175 lb 11.3 oz (79.7 kg)   SpO2 100%   BMI 23.83 kg/m²     I/O:  No intake or output data in the 24 hours ending 01/25/23 1451    PE:  Physical Exam  HENT:      Head: Normocephalic and atraumatic.       Mouth/Throat: Mouth: Mucous membranes are moist.   Eyes:      Pupils: Pupils are equal, round, and reactive to light. Cardiovascular:      Rate and Rhythm: Regular rhythm. Tachycardia present. Pulses: Normal pulses. Heart sounds: No murmur heard. Pulmonary:      Effort: Pulmonary effort is normal.      Breath sounds: Normal breath sounds. No wheezing. Abdominal:      General: There is no distension. Palpations: Abdomen is soft. Tenderness: There is no abdominal tenderness. Musculoskeletal:         General: Swelling present. Right lower leg: Edema present. Left lower leg: Edema present. Skin:     Findings: Bruising present. Neurological:      General: No focal deficit present. Mental Status: He is alert. He is disoriented. Labs & Imaging:   LABS:  CBC:   Recent Labs     01/25/23  1258   WBC 18.0*   HGB 8.8*   HCT 27.6*      .5*                            Renal:   Recent Labs     01/25/23  1258      K 3.7      CO2 21   BUN 32*   CREATININE 1.8*   GLUCOSE 84   ANIONGAP 13     Hepatic:   Recent Labs     01/25/23  1258   AST 84*   ALT 63*   BILITOT 1.5*   ALKPHOS 275*     Troponin:   Recent Labs     01/25/23  1258   TROPONINI 0.13*     BNP: No results for input(s): BNP in the last 72 hours. Lipids: No results for input(s): CHOL, HDL in the last 72 hours. Invalid input(s): LDLCALCU, TRIGLYCERIDE  INR:   Recent Labs     01/25/23  1258   INR 2.03*     Lactate: No results for input(s): LACTATE in the last 72 hours. ABGs:No results for input(s): PHART, DIL1IMP, PO2ART, OSF2YFN, BEART, THGBART, I8VSHKCY, QPZ3THN in the last 72 hours.     UA:  Recent Labs     01/25/23  1316   COLORU Yellow   PHUR 6.0  6.0   WBCUA >100*   RBCUA 0-2   BACTERIA 3+*   CLARITYU Clear   SPECGRAV 1.025   LEUKOCYTESUR MODERATE*   UROBILINOGEN 1.0   BILIRUBINUR SMALL*   BLOODU MODERATE*   GLUCOSEU Negative        IMAGING:  CT Head W/O Contrast   Final Result      No evidence of acute intracranial abnormality. XR CHEST PORTABLE   Final Result   1. Abnormal airspace disease throughout the left hemithorax. 2. Follow-up is recommended with specific attention to obtaining a upright PA and lateral chest          Assessment & Plan:    Mari Yoo is a 58 y.o. male who was admitted for ams. Neruo/Psyc   Acute metabolic encephalopathy   sepsis, ammonia level elevated 92. On chronic opioids, mentation only improved slightly after narcan   - CTH negative   - acetaminophen level pending   - UDS + oxycodone - prescribed opioids for chronic back pain. Mentation improved somewhat after narcan  - ABG pending   - treatment of infection as below   - lactulose for suspected hepatic encephalopathy     Cardiology     NSTEMI type 2 likely from demand in setting of severe sepsis   - trend troponin  - EKG low voltage w/o acute ischemia     Pericardial effusion w/o tamponade physiology   - appears chronic on prior echos   - echo today with normal EF, no tamponade    Respiratory     Acute hypoxia 2/2 possible pneumonia vs fluid  CXR w/ airspace disease  - BNP pending   - abx as below   - wean off supplemental oxygen     Gastroenterology    Acute liver failure   Jaundice, coagulopathy, elevated LFT  - acute hep panel pending   - RUQ US + doppler to r/o PVT  - acetaminophen level pending   - ethanol level negative   - AI serology     Nephrology     ALISON on CKD3 - likely prerenal   Cr 1.8 w/ baseline of 1.2  - nichols In place   - IVF    Anasarca  Albumin 1.7 . Echo w/ normal EF.  CKD3, ua w/ protein  - BNP pending   - protein intake    Infectious disease  Sepsis 2/2 UTI, sacral ulcer stage 4  Hx of urinary retention  Leukocytosis, hypotension, hypothermia   - s/p 30 mg/kg, pressers  - vanc and merrem   - bcx, ucx pending   - lactate normal     Hematology/Oncology    Anemia, macrocytic  Baseline of 9  - start Thiamine   - Folate b12 levels pending     Hx of bilateral PE in 12/22  - on eliquis  - watch for bleeding given coagulopathy     Code Status: No Order  FEN: IVF, No diet orders on file  PPX: SCD  DISPO: ICU    This patient will be discussed with attending, Dr Niki Small MD PGY- 3  Contact via [employer]  1/25/2023,  2:51 PM     Pulmonary & Critical Care    Patient seen and examined. I agree with Dr. Juancho Alvarez history, physical, lab findings, assessment and plan. Pt with lethargy and hypotension  Hx of sepsis from UTI  UA is c/w UTI  CXR shows diffuse left sided ASD  Procalc 43  Blood/Urine Cx in lab  SBP still in 80's - give additional liter NS  May need TLC and vasopressors  LA normal  Cont Vanc/Merrem.  Add Azitrho  Check Resp Panel w/ Covid  Check legionella/strep PNA    Adela Elizabeth MD

## 2023-01-25 NOTE — ED PROVIDER NOTES
[address] [facility] [address] ENCOUNTER          ATTENDING PHYSICIAN NOTE       Date of evaluation: 1/25/2023    Chief Complaint     Altered Mental Status (Unresponsive )      History of Present Illness     Taylor Umanzor is a 58 y.o. male with h/o chronic back pain on multiple medications, pulmonary emboli on Eliquis, chronic kidney disease, gallstones, diabetes, hypertension, hypercholesterolemia, irregular heartbeat of unclear etiology, TIA, stroke, and pancreatitis as well as gastric bypass surgery who presents with altered mental status from home. No other history is obtainable. EMS reports that the patient was last known normal at 11 PM last night which is approximately 13 hours ago. EMS was called to his home because he was altered this morning when the person in the house woke up and found him in this condition. The patient cannot contribute any history. ASSESSMENT / PLAN  (MEDICAL DECISION MAKING)     INITIAL VITALS: BP: 92/61, Temp: (!) 96.4 °F (35.8 °C), Heart Rate: (!) 136, Resp: 18, SpO2: 96 %      Taylor Umanzor is a 58 y.o. male presenting with alteration in mental status. The patient was obtunded on arrival and was placed on a nonrebreather mask because of low oxygen saturation. Oxygenation improved and the patient became more responsive. Initial blood pressures were in the 90s and he was resuscitated with IV fluids suspecting sepsis. Initial work-up revealed leukocytosis, tachycardia, hypotension, and urinalysis concerning for infection and empiric antibiotics were initiated to cover sepsis of unknown etiology including urinary tract infection. Chest x-ray also demonstrated asymmetry. Patient's medical records were reviewed and it did reveal that the patient was admitted in November to [address] [place] [address] or sepsis from a urinary tract infection.   He was also noted to have chronic back pain and is on multiple different medications that could also contribute to alteration in mental status if taken in excess. The patient's  arrived to contribute further history and he noted that the patient was acting a bit unusual the night before and then he could not arouse him today around 1130 this morning. The patient was also seemingly somewhat confused last night but was complaining of back pain which is chronic for him. It is not known whether the patient took excessive amounts of his Percocet or other medications prescribed for him for this reason. There was some initial responded to naloxone administered by EMS and an additional dose of naloxone was administered in the ED with some improvement, however, with increased agitation. CT scan of the head did not reveal any acute intracranial abnormality this was performed because the patient is currently anticoagulated for treatment of prior PE. Also, in reviewing the patient's medical records, he had presented with altered mental status in the setting of UTI with sepsis in November to [facility], and this is likely the underlying issue, possibly complicated with overmedication by the patient for his back pain which has been worse last evening. Stroke does not appear to be apparent. Pneumonia may also be contributory as the patient does have x-ray findings that could be consistent and the patient was hypoxic initially requiring supplemental oxygen. The patient will need to be admitted to the intensive care unit for further management given the tenuous situation and the hospitalist as well as the intensivist were contacted for admission.       Medical Decision Making  Problems Addressed:  Delirium: acute illness or injury that poses a threat to life or bodily functions  Sepsis with encephalopathy without septic shock, due to unspecified organism [facility]): acute illness or injury that poses a threat to life or bodily functions  Urinary tract infection without hematuria, site unspecified: complicated acute illness or injury that poses a threat to life or bodily functions    Amount and/or Complexity of Data Reviewed  Independent Historian: spouse and EMS  External Data Reviewed: labs, radiology, ECG and notes. Labs: ordered. Radiology: ordered and independent interpretation performed. Details: Bedside limited cardiac ultrasound - small pericardial effusion  ECG/medicine tests: ordered and independent interpretation performed. Risk  Prescription drug management. Parenteral controlled substances. Decision regarding hospitalization. Decision not to resuscitate or to de-escalate care because of poor prognosis. Critical Care  Total time providing critical care: 30-74 minutes      Clinical Impression     1. Delirium    2. Sepsis with encephalopathy without septic shock, due to unspecified organism ([facility] [address]    3. Urinary tract infection without hematuria, site unspecified        Disposition     DISPOSITION Admitted 01/25/2023 02:15:03 PM        Diagnostic Results and Other Data       RADIOLOGY:  CT Head W/O Contrast   Final Result      No evidence of acute intracranial abnormality. XR CHEST PORTABLE   Final Result   1. Abnormal airspace disease throughout the left hemithorax.    2. Follow-up is recommended with specific attention to obtaining a upright PA and lateral chest      US DUP ABD PEL RETRO SCROT COMPLETE    (Results Pending)       LABS:   Results for orders placed or performed during the hospital encounter of 01/25/23   BMP w/ Reflex to MG   Result Value Ref Range    Sodium 136 136 - 145 mmol/L    Potassium reflex Magnesium 3.7 3.5 - 5.1 mmol/L    Chloride 102 99 - 110 mmol/L    CO2 21 21 - 32 mmol/L    Anion Gap 13 3 - 16    Glucose 84 70 - 99 mg/dL    BUN 32 (H) 7 - 20 mg/dL    Creatinine 1.8 (H) 0.8 - 1.3 mg/dL    Est, Glom Filt Rate 42 (A) >60    Calcium 7.0 (L) 8.3 - 10.6 mg/dL   Lipase   Result Value Ref Range    Lipase 4.0 (L) 13.0 - 60.0 U/L   CBC with Auto Differential   Result Value Ref Range    WBC 18.0 (H) 4.0 - 11.0 K/uL    RBC 2.72 (L) 4.20 - 5.90 M/uL    Hemoglobin 8.8 (L) 13.5 - 17.5 g/dL    Hematocrit 27.6 (L) 40.5 - 52.5 %    .5 (H) 80.0 - 100.0 fL    MCH 32.4 26.0 - 34.0 pg    MCHC 31.9 31.0 - 36.0 g/dL    RDW 14.7 12.4 - 15.4 %    Platelets 343 866 - 784 K/uL    MPV 7.9 5.0 - 10.5 fL    Neutrophils % 81.4 %    Lymphocytes % 14.1 %    Monocytes % 4.3 %    Eosinophils % 0.0 %    Basophils % 0.2 %    Neutrophils Absolute 14.6 (H) 1.7 - 7.7 K/uL    Lymphocytes Absolute 2.5 1.0 - 5.1 K/uL    Monocytes Absolute 0.8 0.0 - 1.3 K/uL    Eosinophils Absolute 0.0 0.0 - 0.6 K/uL    Basophils Absolute 0.0 0.0 - 0.2 K/uL   Protime-INR   Result Value Ref Range    Protime 23.0 (H) 11.7 - 14.5 sec    INR 2.03 (H) 0.87 - 1.14   Troponin   Result Value Ref Range    Troponin 0.13 (H) <0.01 ng/mL   Urinalysis with Reflex to Culture    Specimen: Urine   Result Value Ref Range    Color, UA Yellow Straw/Yellow    Clarity, UA Clear Clear    Glucose, Ur Negative Negative mg/dL    Bilirubin Urine SMALL (A) Negative    Ketones, Urine TRACE (A) Negative mg/dL    Specific Gravity, UA 1.025 1.005 - 1.030    Blood, Urine MODERATE (A) Negative    pH, UA 6.0 5.0 - 8.0    Protein, UA 30 (A) Negative mg/dL    Urobilinogen, Urine 1.0 <2.0 E.U./dL    Nitrite, Urine Negative Negative    Leukocyte Esterase, Urine MODERATE (A) Negative    Microscopic Examination YES     Urine Type NotGiven     Urine Reflex to Culture Yes    Ammonia   Result Value Ref Range    Ammonia 92 (H) 16 - 60 umol/L   Ethanol   Result Value Ref Range    Ethanol Lvl None Detected mg/dL   Hepatic Function Panel   Result Value Ref Range    Total Protein 4.3 (L) 6.4 - 8.2 g/dL    Albumin 1.7 (L) 3.4 - 5.0 g/dL    Alkaline Phosphatase 275 (H) 40 - 129 U/L    ALT 63 (H) 10 - 40 U/L    AST 84 (H) 15 - 37 U/L    Total Bilirubin 1.5 (H) 0.0 - 1.0 mg/dL    Bilirubin, Direct 1.0 (H) 0.0 - 0.3 mg/dL    Bilirubin, Indirect 0.5 0.0 - 1.0 mg/dL   Lactic Acid   Result Value Ref Range    Lactic Acid 1.4 0.4 - 2.0 mmol/L   Urine Drug Screen   Result Value Ref Range    Amphetamine Screen, Urine Neg Negative <1000ng/mL    Barbiturate Screen, Ur Neg Negative <200 ng/mL    Benzodiazepine Screen, Urine Neg Negative <200 ng/mL    Cannabinoid Scrn, Ur Neg Negative <50 ng/mL    Cocaine Metabolite Screen, Urine Neg Negative <300 ng/mL    Opiate Scrn, Ur Neg Negative <300 ng/mL    PCP Screen, Urine Neg Negative <25 ng/mL    Methadone Screen, Urine Neg Negative <300 ng/mL    Oxycodone Urine POSITIVE (A) Negative <100 ng/ml    FENTANYL SCREEN, URINE Neg Negative <5 ng/mL    pH, UA 6.0     Drug Screen Comment: see below    Blood gas, venous (Lab)   Result Value Ref Range    pH, Ronaldo 7.444 7.350 - 7.450    pCO2, Ronaldo 33.6 (L) 41.0 - 51.0 mmHg    pO2, Ronaldo 48.8 (H) 25.0 - 40.0 mmHg    HCO3, Venous 23.0 (L) 24.0 - 28.0 mmol/L    Base Excess, Ronaldo -0.8 -2.0 - 3.0 mmol/L    O2 Sat, Ronaldo 84 Not established %    Carboxyhemoglobin 0.7 0.0 - 1.5 %    MetHgb, Ronaldo 2.2 (H) 0.0 - 1.5 %    TC02 (Calc), Ronaldo 24 mmol/L    Hemoglobin, Ronaldo, Reduced 15.50 %   Acetaminophen Level   Result Value Ref Range    Acetaminophen Level <5 (L) 10 - 30 ug/mL   Salicylate   Result Value Ref Range    Salicylate, Serum <7.5 (L) 15.0 - 30.0 mg/dL   Microscopic Urinalysis   Result Value Ref Range    WBC, UA >100 (A) 0 - 5 /HPF    RBC, UA 0-2 0 - 4 /HPF    Epithelial Cells, UA 0-1 0 - 5 /HPF    Renal Epithelial, UA 0-1 0 - 1 /HPF    Bacteria, UA 3+ (A) None Seen /HPF   Troponin   Result Value Ref Range    Troponin 0.13 (H) <0.01 ng/mL   Lactic Acid   Result Value Ref Range    Lactic Acid 2.4 (H) 0.4 - 2.0 mmol/L   Procalcitonin   Result Value Ref Range    Procalcitonin 43.88 (H) 0.00 - 0.15 ng/mL   Brain Natriuretic Peptide   Result Value Ref Range    Pro- (H) 0 - 124 pg/mL   EKG 12 Lead   Result Value Ref Range    Ventricular Rate 118 BPM    Atrial Rate 118 BPM    P-R Interval 176 ms    QRS Duration 94 ms    Q-T Interval 336 ms    QTc Calculation (Bazett) 470 ms    P Axis 61 degrees    R Axis 38 degrees    T Axis 31 degrees    Diagnosis       Sinus tachycardia with Fusion complexesLow voltage QRSBorderline ECG     EKG   Rhythm: sinus  tach  Rate: 139  Axis: normal  Ectopy: none  Conduction: normal  ST Segments: no acute change  T Waves: nonspecific  Q Waves: none    Clinical Impression: normal sinus rhythm with no acute changes/low voltage      ED BEDSIDE ULTRASOUND:  POC US ECHOCARDIO TRANSTHORACIC LTD    Result Date: 1/26/2023  POCUS_Cardiac     Exam Information:          Exam type:  Clinically indicated     Indication(s) for Exam:          The exam was performed with the following indications[de-identified]  Tachycardia or arrhythmia         Other Indication(s):  low voltage ECG     Views Obtained & Images Saved for These Views: The pericardial sac, myocardium, 4 chambers, and IVC were identified using the following views[de-identified]          Parasternal long-axis         Parasternal short-axis     Findings:          Pericardial Effusion:  Small pericardial effusion         Cardiac Activity:  Hyperdynamic         RV diameter: Indeterminate     Interpretation:          Pericardial Effusion     Confirmatory study:          What confirmatory study was done?:  Not applicable  Electronically signed by Chandu Soto on Thursday, January 26, 2023 at 8:38 AM : Chandu Soto Attending:      MOST RECENT VITALS:  BP: (!) 84/50,Temp: (!) 96.6 °F (35.9 °C), Heart Rate: (!) 104, Resp: 17, SpO2: 100 %     Procedures     Venous Access Procedure Note  Indication: Need blood for laboratory evaluation and emergent need for intravenous access    Procedure: The patient was placed in the appropriate position and the skin over the puncture site was prepped with alcohol. Intravenous access was obtained in the right external jugular vein and the site was secured appropriately. The patient tolerated the procedure well.     Complications: None        ED Course     Nursing Notes, Past Medical Hx, Past Surgical Hx, Social Hx,Allergies, and Family Hx were reviewed. The patient was given the following medications:  Orders Placed This Encounter   Medications    lactated ringers bolus    piperacillin-tazobactam (ZOSYN) 4,500 mg in sodium chloride 0.9 % 100 mL IVPB (mini-bag)     Order Specific Question:   Antimicrobial Indications     Answer: Other     Order Specific Question:   Other Abx Indication     Answer: Suspected Sepsis of Skin or Soft Tissue Origin    DISCONTD: vancomycin (VANCOCIN) 2,000 mg in sodium chloride 0.9 % 500 mL IVPB     Order Specific Question:   Antimicrobial Indications     Answer: Other     Order Specific Question:   Other Abx Indication     Answer:    Suspected Sepsis of Skin or Soft Tissue Origin    lactated ringers bolus    naloxone (NARCAN) injection 0.4 mg    perflutren lipid microspheres (DEFINITY) injection 1.5 mL    sodium chloride flush 0.9 % injection 5-40 mL    sodium chloride flush 0.9 % injection 5-40 mL    0.9 % sodium chloride infusion    OR Linked Order Group     ondansetron (ZOFRAN-ODT) disintegrating tablet 4 mg     ondansetron (ZOFRAN) injection 4 mg    polyethylene glycol (GLYCOLAX) packet 17 g    OR Linked Order Group     acetaminophen (TYLENOL) tablet 650 mg     acetaminophen (TYLENOL) suppository 650 mg    DISCONTD: 0.9 % sodium chloride infusion    famotidine (PEPCID) 20 mg in sodium chloride (PF) 0.9 % 10 mL injection    FOLLOWED BY Linked Order Group     meropenem (MERREM) 1,000 mg in sodium chloride 0.9 % 100 mL IVPB (mini-bag)      Order Specific Question:   Antimicrobial Indications      Answer:   Sepsis of Unknown Etiology      Order Specific Question:   Sepsis duration of therapy      Answer:   7 days     meropenem (MERREM) 1,000 mg in sodium chloride 0.9 % 100 mL IVPB (mini-bag)      Order Specific Question:   Antimicrobial Indications      Answer:   Sepsis of Unknown Etiology      Order Specific Question:   Sepsis duration of therapy      Answer:   7 days    vancomycin (VANCOCIN) 1,250 mg in sodium chloride 0.9 % 250 mL IVPB     Order Specific Question:   Antimicrobial Indications     Answer:   Sepsis of Unknown Etiology     Order Specific Question:   Sepsis duration of therapy     Answer:   7 days    DISCONTD: apixaban (ELIQUIS) tablet 5 mg     Order Specific Question:   Indication of Use     Answer:   Treatment-DVT/PE    DISCONTD: furosemide (LASIX) injection 20 mg    norepinephrine (LEVOPHED) 16 mg in dextrose 5 % 250 mL infusion     Order Specific Question:   Titrate Infusion? Answer:   Yes     Order Specific Question:   Initial Infusion Dose: Answer:   5 mcg/min     Order Specific Question:   Goal of Therapy is: Answer:   MAP greater than 65 mmHg     Order Specific Question:   Contact Provider if:     Answer:   Patient is receiving the maximum dose and is not achieving the goal of therapy       CONSULTS:  IP CONSULT TO CRITICAL CARE  IP CONSULT TO HOSPITALIST  IP CONSULT TO PHARMACY  IP CONSULT TO PALLIATIVE CARE    Review of Systems     Review of Systems   Unable to perform ROS: Mental status change     Past Medical, Surgical, Family, and Social History     He has a past medical history of Bradycardia, Depression, Diabetes mellitus ([facility] [address] Hyperlipidemia, Hypertension, Osteoarthritis of lumbar spine, Primary osteoarthritis of lumbar spine, Unspecified cerebral artery occlusion with cerebral infarction, and Unspecified cerebral artery occlusion with cerebral infarction. He has a past surgical history that includes back surgery; joint replacement; Gastric bypass surgery; Tonsillectomy; Upper gastrointestinal endoscopy (N/A, 8/9/2019); and Colonoscopy (N/A, 8/9/2019). His family history is not on file. He reports that he has never smoked. He does not have any smokeless tobacco history on file. He reports that he does not drink alcohol and does not use drugs.     Medications     Current Discharge Medication List CONTINUE these medications which have NOT CHANGED    Details   apixaban (ELIQUIS) 5 MG TABS tablet Take 5 mg by mouth 2 times daily      clonazePAM (KLONOPIN) 1 MG tablet Take 1 mg by mouth 2 times daily as needed. doxepin (SINEQUAN) 50 MG capsule Take 150 mg by mouth nightly      furosemide (LASIX) 20 MG tablet Take 20 mg by mouth daily      latanoprost (XALATAN) 0.005 % ophthalmic solution Place 1 drop into both eyes nightly      pantoprazole (PROTONIX) 40 MG tablet Take 40 mg by mouth daily      tiZANidine (ZANAFLEX) 4 MG tablet Take 4 mg by mouth every 8 hours as needed (muscle spasms)      tamsulosin (FLOMAX) 0.4 MG capsule Take 0.4 mg by mouth 2 times daily      losartan (COZAAR) 50 MG tablet Take 50 mg by mouth daily. senna (SENOKOT) 8.6 MG tablet Take 1 tablet by mouth as needed for Constipation. docusate sodium (COLACE) 100 MG capsule Take 100 mg by mouth.      gabapentin (NEURONTIN) 800 MG tablet Take 800 mg by mouth 3 times daily. oxyCODONE-acetaminophen (PERCOCET) 7.5-325 MG per tablet Take 1 tablet by mouth every 12 hours as needed for Pain.      metoprolol succinate (TOPROL XL) 50 MG extended release tablet Take 50 mg by mouth daily      pravastatin (PRAVACHOL) 10 MG tablet Take 10 mg by mouth daily. folic acid (FOLVITE) 1 MG tablet Take 1 mg by mouth daily. montelukast (SINGULAIR) 10 MG tablet Take 10 mg by mouth nightly. loratadine (CLARITIN) 10 MG tablet Take 10 mg by mouth daily. therapeutic multivitamin-minerals (THERAGRAN-M) tablet Take 1 tablet by mouth daily. Allergies     He has No Known Allergies. Physical Exam     INITIAL VITALS: BP: 92/61, Temp: (!) 96.4 °F (35.8 °C), Heart Rate: (!) 136, Resp: 18, SpO2: 96 %   Physical Exam  Vitals and nursing note reviewed. Constitutional:       General: He is not in acute distress. Appearance: He is overweight. He is ill-appearing. He is not diaphoretic.    HENT:      Head: Normocephalic. Eyes:      Extraocular Movements: Extraocular movements intact. Pupils: Pupils are equal, round, and reactive to light. Comments: Pupils dilated to 5 mm post Narcan   Cardiovascular:      Rate and Rhythm: Regular rhythm. Tachycardia present. Pulmonary:      Effort: Pulmonary effort is normal. Tachypnea present. Breath sounds: Normal breath sounds. No wheezing. Abdominal:      General: Bowel sounds are normal. There is no distension. Palpations: Abdomen is soft. Skin:     General: Skin is warm and dry. Findings: Petechiae and wound (Sacral decubitius present on arrival, appears to be cared for with appropriate dressing in place) present. Neurological:      Mental Status: He is lethargic.       Comments: Can speak his name but otherwise mostly unintelligible speech                  Fuad Callejas MD  01/25/23 2846       Fuad Callejas MD  01/26/23 0634

## 2023-01-26 ENCOUNTER — APPOINTMENT (OUTPATIENT)
Dept: GENERAL RADIOLOGY | Age: 63
DRG: 720 | End: 2023-01-26
Payer: MEDICAID

## 2023-01-26 ENCOUNTER — APPOINTMENT (OUTPATIENT)
Dept: ULTRASOUND IMAGING | Age: 63
DRG: 720 | End: 2023-01-26
Payer: MEDICAID

## 2023-01-26 ENCOUNTER — ANCILLARY PROCEDURE (OUTPATIENT)
Dept: EMERGENCY DEPT | Age: 63
DRG: 720 | End: 2023-01-26
Payer: MEDICAID

## 2023-01-26 PROBLEM — R41.0 DELIRIUM: Status: ACTIVE | Noted: 2023-01-26

## 2023-01-26 PROBLEM — A41.9 SEPTIC SHOCK (HCC): Status: ACTIVE | Noted: 2023-01-26

## 2023-01-26 PROBLEM — R65.21 SEPTIC SHOCK (HCC): Status: ACTIVE | Noted: 2023-01-26

## 2023-01-26 LAB
ALBUMIN SERPL-MCNC: 1.8 G/DL (ref 3.4–5)
ALP BLD-CCNC: 322 U/L (ref 40–129)
ALT SERPL-CCNC: 60 U/L (ref 10–40)
AMMONIA: 127 UMOL/L (ref 16–60)
ANION GAP SERPL CALCULATED.3IONS-SCNC: 11 MMOL/L (ref 3–16)
ANTI-NUCLEAR ANTIBODY (ANA): NEGATIVE
APTT: 41.8 SEC (ref 23–34.3)
APTT: >180 SEC (ref 23–34.3)
AST SERPL-CCNC: 69 U/L (ref 15–37)
BASOPHILS ABSOLUTE: 0 K/UL (ref 0–0.2)
BASOPHILS RELATIVE PERCENT: 0.1 %
BILIRUB SERPL-MCNC: 1.3 MG/DL (ref 0–1)
BILIRUBIN DIRECT: 0.9 MG/DL (ref 0–0.3)
BILIRUBIN, INDIRECT: 0.4 MG/DL (ref 0–1)
BUN BLDV-MCNC: 29 MG/DL (ref 7–20)
C-REACTIVE PROTEIN: 148.1 MG/L (ref 0–5.1)
CALCIUM SERPL-MCNC: 7 MG/DL (ref 8.3–10.6)
CHLORIDE BLD-SCNC: 107 MMOL/L (ref 99–110)
CO2: 22 MMOL/L (ref 21–32)
CREAT SERPL-MCNC: 1.7 MG/DL (ref 0.8–1.3)
EKG ATRIAL RATE: 118 BPM
EKG ATRIAL RATE: 127 BPM
EKG DIAGNOSIS: NORMAL
EKG DIAGNOSIS: NORMAL
EKG P AXIS: 61 DEGREES
EKG P-R INTERVAL: 176 MS
EKG Q-T INTERVAL: 336 MS
EKG Q-T INTERVAL: 390 MS
EKG QRS DURATION: 100 MS
EKG QRS DURATION: 94 MS
EKG QTC CALCULATION (BAZETT): 470 MS
EKG QTC CALCULATION (BAZETT): 569 MS
EKG R AXIS: 15 DEGREES
EKG R AXIS: 38 DEGREES
EKG T AXIS: 31 DEGREES
EKG T AXIS: 58 DEGREES
EKG VENTRICULAR RATE: 118 BPM
EKG VENTRICULAR RATE: 128 BPM
EOSINOPHILS ABSOLUTE: 0 K/UL (ref 0–0.6)
EOSINOPHILS RELATIVE PERCENT: 0 %
GFR SERPL CREATININE-BSD FRML MDRD: 45 ML/MIN/{1.73_M2}
GLUCOSE BLD-MCNC: 102 MG/DL (ref 70–99)
HCT VFR BLD CALC: 27.3 % (ref 40.5–52.5)
HEMOGLOBIN: 8.8 G/DL (ref 13.5–17.5)
HIV AG/AB: NORMAL
HIV ANTIGEN: NORMAL
HIV-1 ANTIBODY: NORMAL
HIV-2 AB: NORMAL
INR BLD: 1.96 (ref 0.87–1.14)
LACTIC ACID: 1 MMOL/L (ref 0.4–2)
LACTIC ACID: 1.1 MMOL/L (ref 0.4–2)
LACTIC ACID: 1.1 MMOL/L (ref 0.4–2)
LYMPHOCYTES ABSOLUTE: 1.5 K/UL (ref 1–5.1)
LYMPHOCYTES RELATIVE PERCENT: 6.2 %
MAGNESIUM: 1.6 MG/DL (ref 1.8–2.4)
MCH RBC QN AUTO: 32.9 PG (ref 26–34)
MCHC RBC AUTO-ENTMCNC: 32.3 G/DL (ref 31–36)
MCV RBC AUTO: 101.7 FL (ref 80–100)
MONOCYTES ABSOLUTE: 1.5 K/UL (ref 0–1.3)
MONOCYTES RELATIVE PERCENT: 6.2 %
NEUTROPHILS ABSOLUTE: 21.3 K/UL (ref 1.7–7.7)
NEUTROPHILS RELATIVE PERCENT: 87.5 %
PDW BLD-RTO: 15 % (ref 12.4–15.4)
PLATELET # BLD: 398 K/UL (ref 135–450)
PMV BLD AUTO: 7.9 FL (ref 5–10.5)
POTASSIUM REFLEX MAGNESIUM: 3.8 MMOL/L (ref 3.5–5.1)
PREALBUMIN: 3.7 MG/DL (ref 20–40)
PROTHROMBIN TIME: 22.4 SEC (ref 11.7–14.5)
RBC # BLD: 2.69 M/UL (ref 4.2–5.9)
REPORT: NORMAL
RESPIRATORY PANEL PCR: NORMAL
SEDIMENTATION RATE, ERYTHROCYTE: <1 MM/HR (ref 0–20)
SODIUM BLD-SCNC: 140 MMOL/L (ref 136–145)
TOTAL PROTEIN: 4.2 G/DL (ref 6.4–8.2)
TROPONIN: 0.12 NG/ML
TROPONIN: 0.13 NG/ML
TROPONIN: 0.14 NG/ML
TROPONIN: 0.18 NG/ML
TROPONIN: 0.18 NG/ML
TROPONIN: 0.2 NG/ML
TROPONIN: 0.2 NG/ML
WBC # BLD: 24.3 K/UL (ref 4–11)

## 2023-01-26 PROCEDURE — 93308 TTE F-UP OR LMTD: CPT

## 2023-01-26 PROCEDURE — 6370000000 HC RX 637 (ALT 250 FOR IP)

## 2023-01-26 PROCEDURE — 2500000003 HC RX 250 WO HCPCS

## 2023-01-26 PROCEDURE — 2580000003 HC RX 258

## 2023-01-26 PROCEDURE — 6360000002 HC RX W HCPCS

## 2023-01-26 PROCEDURE — 2500000003 HC RX 250 WO HCPCS: Performed by: STUDENT IN AN ORGANIZED HEALTH CARE EDUCATION/TRAINING PROGRAM

## 2023-01-26 PROCEDURE — 93005 ELECTROCARDIOGRAM TRACING: CPT

## 2023-01-26 PROCEDURE — 6360000002 HC RX W HCPCS: Performed by: STUDENT IN AN ORGANIZED HEALTH CARE EDUCATION/TRAINING PROGRAM

## 2023-01-26 PROCEDURE — 85730 THROMBOPLASTIN TIME PARTIAL: CPT

## 2023-01-26 PROCEDURE — 6370000000 HC RX 637 (ALT 250 FOR IP): Performed by: STUDENT IN AN ORGANIZED HEALTH CARE EDUCATION/TRAINING PROGRAM

## 2023-01-26 PROCEDURE — 85025 COMPLETE CBC W/AUTO DIFF WBC: CPT

## 2023-01-26 PROCEDURE — 83735 ASSAY OF MAGNESIUM: CPT

## 2023-01-26 PROCEDURE — 99291 CRITICAL CARE FIRST HOUR: CPT | Performed by: INTERNAL MEDICINE

## 2023-01-26 PROCEDURE — 02HV33Z INSERTION OF INFUSION DEVICE INTO SUPERIOR VENA CAVA, PERCUTANEOUS APPROACH: ICD-10-PCS | Performed by: HOSPITALIST

## 2023-01-26 PROCEDURE — 86701 HIV-1ANTIBODY: CPT

## 2023-01-26 PROCEDURE — A4216 STERILE WATER/SALINE, 10 ML: HCPCS

## 2023-01-26 PROCEDURE — 84484 ASSAY OF TROPONIN QUANT: CPT

## 2023-01-26 PROCEDURE — 99253 IP/OBS CNSLTJ NEW/EST LOW 45: CPT | Performed by: NURSE PRACTITIONER

## 2023-01-26 PROCEDURE — 83605 ASSAY OF LACTIC ACID: CPT

## 2023-01-26 PROCEDURE — 0JBQ0ZZ EXCISION OF RIGHT FOOT SUBCUTANEOUS TISSUE AND FASCIA, OPEN APPROACH: ICD-10-PCS

## 2023-01-26 PROCEDURE — 85652 RBC SED RATE AUTOMATED: CPT

## 2023-01-26 PROCEDURE — 36415 COLL VENOUS BLD VENIPUNCTURE: CPT

## 2023-01-26 PROCEDURE — 93010 ELECTROCARDIOGRAM REPORT: CPT | Performed by: INTERNAL MEDICINE

## 2023-01-26 PROCEDURE — 0JBR0ZZ EXCISION OF LEFT FOOT SUBCUTANEOUS TISSUE AND FASCIA, OPEN APPROACH: ICD-10-PCS

## 2023-01-26 PROCEDURE — 93975 VASCULAR STUDY: CPT

## 2023-01-26 PROCEDURE — 0202U NFCT DS 22 TRGT SARS-COV-2: CPT

## 2023-01-26 PROCEDURE — 36556 INSERT NON-TUNNEL CV CATH: CPT

## 2023-01-26 PROCEDURE — 2000000000 HC ICU R&B

## 2023-01-26 PROCEDURE — 87449 NOS EACH ORGANISM AG IA: CPT

## 2023-01-26 PROCEDURE — 85610 PROTHROMBIN TIME: CPT

## 2023-01-26 PROCEDURE — 86140 C-REACTIVE PROTEIN: CPT

## 2023-01-26 PROCEDURE — 83036 HEMOGLOBIN GLYCOSYLATED A1C: CPT

## 2023-01-26 PROCEDURE — 80048 BASIC METABOLIC PNL TOTAL CA: CPT

## 2023-01-26 PROCEDURE — 73630 X-RAY EXAM OF FOOT: CPT

## 2023-01-26 PROCEDURE — 94761 N-INVAS EAR/PLS OXIMETRY MLT: CPT

## 2023-01-26 PROCEDURE — 87390 HIV-1 AG IA: CPT

## 2023-01-26 PROCEDURE — 84134 ASSAY OF PREALBUMIN: CPT

## 2023-01-26 PROCEDURE — 2580000003 HC RX 258: Performed by: STUDENT IN AN ORGANIZED HEALTH CARE EDUCATION/TRAINING PROGRAM

## 2023-01-26 PROCEDURE — 82140 ASSAY OF AMMONIA: CPT

## 2023-01-26 PROCEDURE — 80076 HEPATIC FUNCTION PANEL: CPT

## 2023-01-26 PROCEDURE — 86702 HIV-2 ANTIBODY: CPT

## 2023-01-26 RX ORDER — LACTULOSE 10 G/15ML
10 SOLUTION ORAL 3 TIMES DAILY
Status: DISCONTINUED | OUTPATIENT
Start: 2023-01-26 | End: 2023-02-04 | Stop reason: HOSPADM

## 2023-01-26 RX ORDER — CASTOR OIL AND BALSAM, PERU 788; 87 MG/G; MG/G
OINTMENT TOPICAL 2 TIMES DAILY
Status: DISCONTINUED | OUTPATIENT
Start: 2023-01-26 | End: 2023-02-04 | Stop reason: HOSPADM

## 2023-01-26 RX ORDER — MAGNESIUM SULFATE IN WATER 40 MG/ML
2000 INJECTION, SOLUTION INTRAVENOUS ONCE
Status: COMPLETED | OUTPATIENT
Start: 2023-01-26 | End: 2023-01-26

## 2023-01-26 RX ORDER — HEPARIN SODIUM 10000 [USP'U]/100ML
5-30 INJECTION, SOLUTION INTRAVENOUS CONTINUOUS
Status: DISCONTINUED | OUTPATIENT
Start: 2023-01-26 | End: 2023-02-03

## 2023-01-26 RX ORDER — LACTULOSE 10 G/15ML
10 SOLUTION ORAL 3 TIMES DAILY
Status: DISCONTINUED | OUTPATIENT
Start: 2023-01-26 | End: 2023-01-26

## 2023-01-26 RX ORDER — PROCHLORPERAZINE MALEATE 10 MG
10 TABLET ORAL EVERY 6 HOURS PRN
Status: DISCONTINUED | OUTPATIENT
Start: 2023-01-26 | End: 2023-02-04 | Stop reason: HOSPADM

## 2023-01-26 RX ORDER — PROCHLORPERAZINE EDISYLATE 5 MG/ML
10 INJECTION INTRAMUSCULAR; INTRAVENOUS EVERY 6 HOURS PRN
Status: DISCONTINUED | OUTPATIENT
Start: 2023-01-26 | End: 2023-02-04 | Stop reason: HOSPADM

## 2023-01-26 RX ORDER — CLONAZEPAM 1 MG/1
1 TABLET ORAL 2 TIMES DAILY PRN
Status: DISCONTINUED | OUTPATIENT
Start: 2023-01-26 | End: 2023-02-04 | Stop reason: HOSPADM

## 2023-01-26 RX ORDER — 0.9 % SODIUM CHLORIDE 0.9 %
500 INTRAVENOUS SOLUTION INTRAVENOUS ONCE
Status: COMPLETED | OUTPATIENT
Start: 2023-01-26 | End: 2023-01-26

## 2023-01-26 RX ORDER — HEPARIN SODIUM 1000 [USP'U]/ML
40 INJECTION, SOLUTION INTRAVENOUS; SUBCUTANEOUS PRN
Status: DISCONTINUED | OUTPATIENT
Start: 2023-01-26 | End: 2023-02-03 | Stop reason: ALTCHOICE

## 2023-01-26 RX ORDER — HEPARIN SODIUM 1000 [USP'U]/ML
80 INJECTION, SOLUTION INTRAVENOUS; SUBCUTANEOUS ONCE
Status: COMPLETED | OUTPATIENT
Start: 2023-01-26 | End: 2023-01-26

## 2023-01-26 RX ORDER — HEPARIN SODIUM 1000 [USP'U]/ML
80 INJECTION, SOLUTION INTRAVENOUS; SUBCUTANEOUS PRN
Status: DISCONTINUED | OUTPATIENT
Start: 2023-01-26 | End: 2023-02-03 | Stop reason: ALTCHOICE

## 2023-01-26 RX ORDER — LIDOCAINE HYDROCHLORIDE AND EPINEPHRINE 10; 10 MG/ML; UG/ML
20 INJECTION, SOLUTION INFILTRATION; PERINEURAL ONCE
Status: COMPLETED | OUTPATIENT
Start: 2023-01-26 | End: 2023-01-26

## 2023-01-26 RX ADMIN — LACTULOSE 10 G: 20 SOLUTION ORAL at 06:52

## 2023-01-26 RX ADMIN — LIDOCAINE HYDROCHLORIDE,EPINEPHRINE BITARTRATE 20 ML: 10; .01 INJECTION, SOLUTION INFILTRATION; PERINEURAL at 15:12

## 2023-01-26 RX ADMIN — MEROPENEM 1000 MG: 1 INJECTION, POWDER, FOR SOLUTION INTRAVENOUS at 00:08

## 2023-01-26 RX ADMIN — CLONAZEPAM 1 MG: 1 TABLET ORAL at 21:49

## 2023-01-26 RX ADMIN — SODIUM CHLORIDE, PRESERVATIVE FREE 20 MG: 5 INJECTION INTRAVENOUS at 09:06

## 2023-01-26 RX ADMIN — PHENYLEPHRINE HYDROCHLORIDE 10 MCG/MIN: 50 INJECTION INTRAVENOUS at 07:00

## 2023-01-26 RX ADMIN — MEROPENEM 1000 MG: 1 INJECTION, POWDER, FOR SOLUTION INTRAVENOUS at 09:11

## 2023-01-26 RX ADMIN — ACETAMINOPHEN 650 MG: 325 TABLET, FILM COATED ORAL at 09:06

## 2023-01-26 RX ADMIN — HEPARIN SODIUM 6400 UNITS: 1000 INJECTION INTRAVENOUS; SUBCUTANEOUS at 18:15

## 2023-01-26 RX ADMIN — LACTULOSE 10 G: 10 SOLUTION ORAL at 14:10

## 2023-01-26 RX ADMIN — MEROPENEM 1000 MG: 1 INJECTION, POWDER, FOR SOLUTION INTRAVENOUS at 21:33

## 2023-01-26 RX ADMIN — SODIUM CHLORIDE 500 ML: 9 INJECTION, SOLUTION INTRAVENOUS at 19:56

## 2023-01-26 RX ADMIN — NOREPINEPHRINE BITARTRATE 13 MCG/MIN: 1 INJECTION, SOLUTION, CONCENTRATE INTRAVENOUS at 09:08

## 2023-01-26 RX ADMIN — ACETAMINOPHEN 650 MG: 325 TABLET, FILM COATED ORAL at 15:06

## 2023-01-26 RX ADMIN — SODIUM CHLORIDE, PRESERVATIVE FREE 10 ML: 5 INJECTION INTRAVENOUS at 09:11

## 2023-01-26 RX ADMIN — LACTULOSE 10 G: 10 SOLUTION ORAL at 21:31

## 2023-01-26 RX ADMIN — VANCOMYCIN HYDROCHLORIDE 1250 MG: 10 INJECTION, POWDER, LYOPHILIZED, FOR SOLUTION INTRAVENOUS at 14:04

## 2023-01-26 RX ADMIN — Medication: at 09:11

## 2023-01-26 RX ADMIN — Medication: at 21:26

## 2023-01-26 RX ADMIN — MAGNESIUM SULFATE HEPTAHYDRATE 2000 MG: 40 INJECTION, SOLUTION INTRAVENOUS at 05:53

## 2023-01-26 RX ADMIN — HEPARIN SODIUM 18 UNITS/KG/HR: 10000 INJECTION, SOLUTION INTRAVENOUS at 18:22

## 2023-01-26 ASSESSMENT — PAIN SCALES - GENERAL
PAINLEVEL_OUTOF10: 0
PAINLEVEL_OUTOF10: 3
PAINLEVEL_OUTOF10: 0
PAINLEVEL_OUTOF10: 0

## 2023-01-26 NOTE — PROGRESS NOTES
Transport pt from ED to room 4704 with ICU monitoring/ pt remains lethargic/ soft/ BP/ -120/ cont infusing fluids/ ICU team notified of pt arival to unit/ pending orders    1615/ Labs drawn and specimens sent to lab for testing as ordered/ wound care nurse consulted for eval/ Tx pt has multi wounds    1630 spouse and niece at bedside speaking with ICU team/ see orders/ flow sheet/ notes    1845 levophed gtt ordered/ will start infusing in PIV/ pt will require CVL placement per policy/ ICU team aware/ see flow sheet/ orders/ notes

## 2023-01-26 NOTE — CONSULTS
Consulted for bilateral feet, Sacrum - Stage 4, bilateral knees. Consulted General Surgery for sacral wound - possible debridement. Consulted Podiatry for bilateral feet. L Knee - cleaned with NS, applied hydrogel to abrasion. R Knee - dry and intact eschar. L Posterior Flank - non-blanchable erythema with purple areas, recommend Venelex. Wound care orders placed.      L Knee      R Knee      L Posterior Flank:

## 2023-01-26 NOTE — PROGRESS NOTES
Pharmacy Progress Note    Heparin high-dose weight based infusion is ordered for patient. Per chart review, patient has received an oral Factor Xa inhibitor (Eliquis) within the last 72 hours. As oral Factor Xa inhibitors can impact the anti-Xa test calibrated to unfractionated heparin, Heparin infusion will be monitored and adjusted using aPTT's per [address] [facility] [address]. APTT algorithm:  Initial dose is 18 units/kg/hr. Recorded patient weight: 80 kg  * 18 units/kg/hr = initial rate of 1440 units/hr    Previous anticoagulation: apixaban   Last dose of previous anticoagulation (date/time):  1/24 ~ 2100    **Use original weight used to start heparin for all adjustments**  Maximum initial infusion rate = 2100 units/hr    aPTT < 59         Heparin 80 units/kg bolus     Increase infusion by 4 units/kg/hr                            (maximum 10,000 units)  aPTT 59-72.9    Heparin 40 units/kg bolus     Increase infusion by 2 units/kg/hr                            (maximum 5,000 units)  aPTT      No bolus                                No change  aPTT 102.1-109      No bolus                          Decrease infusion by 1 units/kg/hr  aPTT  109.1-122.9  No bolus Decrease infusion by 2 units/kg/hr  aPTT  > 123      Hold heparin for 1 hour         Decrease infusion by 3 units/kg/hr    Obtain aPTT 6 hours after initial bolus and 6 hours after any dose change until two consecutive therapeutic aPTTs are achieved - then daily    Pharmacy will monitor daily to assist with adjustments & ordering of labs as needed. If patient remains on heparin infusion 72 hours from last dose of oral factor Xa inhibitor, pharmacy will change infusion back to usual monitoring via the Anti-Xa algorithm per [address] [facility] [address], as the interaction will no longer occur at that time. Please call with any questions    Alicia Edward  [phone] ([facility])

## 2023-01-26 NOTE — CONSENT
Informed Consent for Central Line Insertion     I have discussed with the spouse the rationale for Central Catheter Insertion; its benefits in allowing the ability to administer medications such as pressor medications which will contribute to vital components such as the patient's blood pressure; and its risk which includes but is not limited to pneumothorax. I have discussed the alternatives, including the risk and consequences of not receiving the procedure. The spouse had an opportunity to ask questions and had agreed to the procedure.     Electronically signed by Aroldo Montesinos MD on 1/25/23 at 7:09 PM EST

## 2023-01-26 NOTE — PROGRESS NOTES
Wound debridement completed at bedside by surgical residents - Dr. Suri Goldman and Dr. Jolanta Marquez. Wet to dry drsg applied after procedure.

## 2023-01-26 NOTE — PROCEDURES
Darleen Piedra is a 58 y.o. male patient. 1. Delirium    2. Sepsis with encephalopathy without septic shock, due to unspecified organism ([facility] [address]    3. Urinary tract infection without hematuria, site unspecified      Past Medical History:   Diagnosis Date    Bradycardia     Depression     Diabetes mellitus ([facility] [address]     no meds    Hyperlipidemia     Hypertension     Osteoarthritis of lumbar spine     Primary osteoarthritis of lumbar spine     Unspecified cerebral artery occlusion with cerebral infarction 2003    TIA    Unspecified cerebral artery occlusion with cerebral infarction      Blood pressure (!) 101/59, pulse (!) 114, temperature 97.5 °F (36.4 °C), temperature source Bladder, resp. rate 14, height 6' (1.829 m), weight 175 lb 11.3 oz (79.7 kg), SpO2 100 %. Central Line    Date/Time: 1/26/2023 2:10 AM  Performed by: Shruti Garcia MD  Authorized by: Rafael Martinez MD   Consent: Verbal consent obtained. Risks and benefits: risks, benefits and alternatives were discussed  Consent given by: spouse  Time out: Immediately prior to procedure a \"time out\" was called to verify the correct patient, procedure, equipment, support staff and site/side marked as required.   Indications: vascular access    Anesthesia:  Local Anesthetic: lidocaine 1% without epinephrine  Location details: left internal jugular  Patient position: Trendelenburg  Catheter type: triple lumen  Ultrasound guidance: yes  Sterile ultrasound techniques: sterile gel and sterile probe covers were used  Number of attempts: 1  Successful placement: yes  Post-procedure: line sutured and dressing applied  Assessment: blood return through all ports, placement verified by x-ray and no pneumothorax on x-ray  Patient tolerance: patient tolerated the procedure well with no immediate complications  Comments: EBL <10ml        Shruti Garcia MD  1/26/2023

## 2023-01-26 NOTE — PROGRESS NOTES
Clinical Pharmacy Progress Note    Vancomycin - Management by Pharmacy    Consult Date(s): 1/25/23  Consulting Provider(s): Dr.Alexander Washburn    Assessment / Plan  Sepsis of unknown - Vancomycin  Concurrent Antimicrobials:  Meropenem (Day 2/7)  Azithromycin Day #2  Day of Vanc Therapy / Ordered Duration: (Day 2/7)  Current Dosing Method: Bayesian-Guided AUC Dosing  Therapeutic Goal: -600 mg/L*hr  Current Dose / Plan:   SCr this morning slightly improved from 1.8->1.7 this AM (baseline ~1.2 per labs 01/03/23). UOP over last 24 hours ~ 0.8 ml/kg/hr. Patient received a LD of vancomycin 2000 mg IV x1 (25 mg/kg) yesterday in the ED, followed by vancomycin 1250 mg IV every 24 hours  Kinetics estimate  ml/kg. hr and trough 16.8 mg/L  Will continue current regimen for now and get a random level tomorrow AM to confirm kinetics. Will continue to monitor clinical condition and make adjustments to regimen as appropriate. Thank you for consulting Pharmacy! Jaclynn Cheadle, PharmD  PGY-1 Pharmacy Resident  [facility]  [id number]  1/26/2023   9:31 AM        Interval Update: Patient continues to have altered mental status this morning. Patient slightly febrile this AM (Tmax 100.4) and tachycardic. Procalcitonin 43.9 and WBC trend 18->24.3. Subjective/Objective:   Sheron Beck is a 58 y.o. male with a past medical history significant for chronic back pain (on multiple medications), pulmonary emboli (on Eliquis), CKD, gallstones, DM, HTN, HLD, TIA, stroke and recent history of urosepsis (hospitalized at [facility] 11/2022) who is admitted with Sepsis . Pharmacy is consulted to dose vancomycin.     Ht Readings from Last 1 Encounters:   01/25/23 6' (1.829 m)     Wt Readings from Last 1 Encounters:   01/26/23 176 lb 5.9 oz (80 kg)     Current & Prior Antimicrobial Regimen(s):  Meropenem EI (01/25-Current)  Azithromycin (01/25-Current)  Vancomycin PTD (01/25-Current)  2000 mg IV x1 LD (01/25)  1250 mg IV every 24 hours (01/25-Current)     Level(s) / Doses:    Date Time Dose Type of Level / Level Interpretation                 Note: Serum levels collected for AUC-based dosing may be high if collected in close proximity to the dose administered. This is not necessarily indicative of toxicity. Cultures & Sensitivities:    Date Site Micro Susceptibility / Result   01/25 Blood 1/2 Strep species    01/25 Blood 1/2 In process    01/25 Urine Cx E coli    01/25 Legionella urine Ordered    01/26 Strep pneumo urine Collected    01/26 Resp Panel In process      Recent Labs     01/25/23  1258 01/26/23  0358   CREATININE 1.8* 1.7*   BUN 32* 29*   WBC 18.0* 24.3*         Estimated Creatinine Clearance: 49 mL/min (A) (based on SCr of 1.7 mg/dL (H)).     Additional Lab Values / Findings of Note:    Recent Labs     01/25/23  1619   PROCAL 43.88*

## 2023-01-26 NOTE — DISCHARGE INSTRUCTIONS
Podiatry Wound Care Discharge Instructions  Please perform every day dressing changes to right lower extremity as follows  -Apply Betadine to the wounds on the right lower leg  -Next apply gauze to the top of the right foot, ankle, and leg  -Next loosely wrap the right lower extremity with Kerlix starting from just in front of the toes and ending just below the knee  -Next gently wrap the right foot with 4 inch Ace bandage starting from just in front of the toes and ending just above the ankle  -Next gently wrap the right leg with 6 inch Ace bandage starting from just above the ankle and ending just below the knee    Please perform every day dressing changes to left lower extremity as follows  -Apply Santyl to the wound on the outside of the left foot  -Next apply saline moistened gauze over the santyl  -Next apply gauze to the top of the left foot, ankle, and leg  -Next loosely wrap the left lower extremity with Kerlix starting from just in front of the toes and ending just below the knee  -Next gently wrap the left foot with 4 inch Ace bandage starting from just in front of the toes and ending just above the ankle  -Next gently wrap the left leg with 6 inch Ace bandage starting from just above the ankle and ending just below the knee    Patient is weightbearing as tolerated Bilateral lower extremity  Please follow-up with [address] [place] [address] podiatry or with your previous podiatrist within 1 week of hospital discharge

## 2023-01-26 NOTE — PLAN OF CARE
Problem: Pain  Goal: Verbalizes/displays adequate comfort level or baseline comfort level  1/26/2023 1117 by Jose Bonilla RN  Outcome: Progressing    Problem: Safety - Adult  Goal: Free from fall injury  1/26/2023 1117 by Jose Bonilla RN  Outcome: Progressing     Problem: Skin/Tissue Integrity  Goal: Absence of new skin breakdown  Description: 1. Monitor for areas of redness and/or skin breakdown  2. Assess vascular access sites hourly  3. Every 4-6 hours minimum:  Change oxygen saturation probe site  4. Every 4-6 hours:  If on nasal continuous positive airway pressure, respiratory therapy assess nares and determine need for appliance change or resting period.   1/26/2023 1117 by Jose Bonilla RN  Outcome: Progressing

## 2023-01-26 NOTE — PROGRESS NOTES
ICU Progress Note    Admit Date: 1/25/2023  Day: 2  Vent Day: None  IV Access: [facility] 1/25/2023    Diet: Diet NPO    CC: AMS    Interval history:   - NAEO  - Pt continues to have altered mental status, likely due to urosepsis however he does have an elevated ammonia level which could contribute to this as well  - Lactulose, AZX, Merrem, Vanc on board for empriric coverage and awaiting cultures to finalize  - Consider starting on heparin gtt since pt has hx of PE on Eliquis    HPI:   Mr. Fortino Dunbar is a 58 y.o. M with PMH of chronic back pain, pulmonary emboli on eliquis, CKD, gallstones, DM, HTN, HLD, TIA, stroke, pancreatitis, gastric bypass surgery who presents with altered mental status from home. He was last known normal 13 hours prior to admission (11 PM 1/25). Pt. Cannot contribute history. Pt. Was found in home by person in house who found him. I spoke with patient's  and niece, who are both very close with him. They state that the patient should be full code however patient has noted in the past that he does not want to be intubated for any extended period of time.  states the patient does not drink alcohol, does not smoke, does not do any illicit drugs. Patient's  and niece both state that the patient has been declining significantly in the past 6 months. His memory has started to worsen as well as his ability for activities of daily living. At [facility] in the end of Nov both blood Cx show staph epidermidis susceptible to vanc and bactrim. Urine Cx + for E. Coli and susceptible to multiple mediations. During same visit, the patient was found to have UTI, L lower extremity cellulitis, end organ damage shock and sacral decubitus ulcer. Per chart review, Tizanidine, Percocet, Gabapentin are all prescribed for low back pain. Per note from 1/19 at [facility] health; pain is worse and he wants to decrease the dose of meds so that the patient can take it more frequently. Pt.  Also takes Seroquel, clonopin, and doxepin. Pt. Also takes tamsulosin for BPH. In the ED vanc and zosyn were given. Narcan was also given. Pending acetaminophen and salicylate levels. During same visit, the patient was found to have UTI, L lower extremity cellulitis, end organ damage shock and sacral decubitus ulcer.     Medications:     Scheduled Meds:   Venelex   Topical BID    lactulose  10 g Oral TID    sodium chloride flush  5-40 mL IntraVENous 2 times per day    famotidine (PEPCID) injection  20 mg IntraVENous Daily    meropenem  1,000 mg IntraVENous Q12H    vancomycin  1,250 mg IntraVENous Q24H    azithromycin  500 mg IntraVENous Q24H     Continuous Infusions:   phenylephrine (NARENDRA-SYNEPHRINE) 50 mg/250 mL infusion 10 mcg/min (01/26/23 0700)    sodium chloride      norepinephrine 20 mcg/min (01/26/23 0020)     PRN Meds:perflutren lipid microspheres, sodium chloride flush, sodium chloride, ondansetron **OR** ondansetron, polyethylene glycol, acetaminophen **OR** acetaminophen    Objective:   Vitals:   T-max:  Patient Vitals for the past 8 hrs:   BP Temp Temp src Pulse Resp SpO2 Weight   01/26/23 0715 112/69 -- -- (!) 147 14 100 % --   01/26/23 0700 120/66 -- -- (!) 149 21 98 % --   01/26/23 0645 122/60 -- -- (!) 153 22 96 % --   01/26/23 0630 119/74 -- -- (!) 155 22 100 % --   01/26/23 0615 -- -- -- (!) 149 22 100 % --   01/26/23 0600 109/68 -- -- (!) 142 15 100 % 176 lb 5.9 oz (80 kg)   01/26/23 0545 110/77 -- -- (!) 147 26 100 % --   01/26/23 0530 112/67 -- -- (!) 146 18 100 % --   01/26/23 0515 111/61 -- -- (!) 137 12 100 % --   01/26/23 0500 113/65 -- -- (!) 128 13 100 % --   01/26/23 0445 105/63 -- -- (!) 131 14 100 % --   01/26/23 0430 111/65 -- -- (!) 133 14 100 % --   01/26/23 0415 (!) 100/58 -- -- (!) 127 11 100 % --   01/26/23 0400 (!) 105/59 98.8 °F (37.1 °C) Bladder (!) 129 10 100 % --   01/26/23 0345 (!) 106/59 -- -- (!) 133 14 100 % --   01/26/23 0330 (!) 89/55 -- -- (!) 138 14 100 % --   01/26/23 0315 93/60 -- -- (!) 139 14 100 % --   01/26/23 0300 (!) 89/56 -- -- (!) 136 14 100 % --   01/26/23 0245 (!) 96/57 -- -- (!) 132 12 100 % --   01/26/23 0230 (!) 102/58 -- -- (!) 135 14 100 % --   01/26/23 0215 -- -- -- (!) 133 (!) 0 100 % --   01/26/23 0200 (!) 94/56 -- -- (!) 128 (!) 0 100 % --   01/26/23 0145 (!) 95/55 -- -- (!) 127 14 100 % --   01/26/23 0130 (!) 92/54 -- -- (!) 127 13 100 % --   01/26/23 0115 (!) 97/56 -- -- (!) 127 13 100 % --   01/26/23 0100 (!) 94/56 -- -- (!) 127 (!) 0 100 % --   01/26/23 0045 (!) 91/57 -- -- (!) 126 (!) 8 100 % --   01/26/23 0030 (!) 88/54 -- -- (!) 126 14 100 % --       Intake/Output Summary (Last 24 hours) at 1/26/2023 0824  Last data filed at 1/26/2023 0700  Gross per 24 hour   Intake 3806.7 ml   Output 1425 ml   Net 2381.7 ml       Review of Systems   Unable to perform ROS: Acuity of condition     Physical Exam  Constitutional:       Appearance: He is ill-appearing and toxic-appearing. HENT:      Head: Normocephalic and atraumatic. Eyes:      Extraocular Movements: Extraocular movements intact. Pupils: Pupils are equal, round, and reactive to light. Cardiovascular:      Rate and Rhythm: Regular rhythm. Tachycardia present. Pulses: Normal pulses. Heart sounds: Normal heart sounds. Pulmonary:      Effort: Pulmonary effort is normal.      Breath sounds: Normal breath sounds. Abdominal:      General: Abdomen is flat. Palpations: Abdomen is soft. Skin:     General: Skin is warm and dry. Neurological:      Mental Status: He is alert. He is disoriented.          LABS:    CBC:   Recent Labs     01/25/23  1258 01/26/23  0358   WBC 18.0* 24.3*   HGB 8.8* 8.8*   HCT 27.6* 27.3*    398   .5* 101.7*     Renal:    Recent Labs     01/25/23  1258 01/26/23  0358    140   K 3.7 3.8    107   CO2 21 22   BUN 32* 29*   CREATININE 1.8* 1.7*   GLUCOSE 84 102*   CALCIUM 7.0* 7.0*   MG  --  1.60*   ANIONGAP 13 11     Hepatic:   Recent Labs 01/25/23  1258 01/26/23  0358   AST 84* 69*   ALT 63* 60*   BILITOT 1.5* 1.3*   BILIDIR 1.0* 0.9*   PROT 4.3* 4.2*   LABALBU 1.7* 1.8*   ALKPHOS 275* 322*     Troponin:   Recent Labs     01/26/23  0016 01/26/23  0506 01/26/23  0559   TROPONINI 0.12* 0.13* 0.14*     BNP: No results for input(s): BNP in the last 72 hours. Lipids: No results for input(s): CHOL, HDL in the last 72 hours. Invalid input(s): LDLCALCU, TRIGLYCERIDE  ABGs:  No results for input(s): PHART, MBA0OGG, PO2ART, ORP3XAU, BEART, THGBART, Y9KDJPHH, XQW2XTX in the last 72 hours. INR:   Recent Labs     01/25/23  1258 01/26/23  0358   INR 2.03* 1.96*     Lactate: No results for input(s): LACTATE in the last 72 hours. Cultures:  -----------------------------------------------------------------  RAD:   XR CHEST PORTABLE   Final Result         1. Good position of left IJ central venous catheter in the lower SVC   2. No pneumothorax   3. Bilateral pleural effusions and left greater than right lung airspace disease             CT Head W/O Contrast   Final Result      No evidence of acute intracranial abnormality. XR CHEST PORTABLE   Final Result   1. Abnormal airspace disease throughout the left hemithorax. 2. Follow-up is recommended with specific attention to obtaining a upright PA and lateral chest      US DUP ABD PEL RETRO SCROT COMPLETE    (Results Pending)         Assessment/Plan:   Mr. Jagijt Meza is a 58 y.o. M with PMH of chronic back pain, pulmonary emboli on eliquis, CKD, gallstones, DM, HTN, HLD, TIA, stroke, pancreatitis, gastric bypass surgery who presents with altered mental status from home. Altered Mental Status 2/2 Urosepsis  Pt has a hx of sepsis from UTI  Home Medications include Seroquel, clonopin, and doxepin  Pt. Hypotensive and tachycardic on arrival.  CT Head shows no acute abnormality.   U/A + for Leukocyte esterase and over 100 WBC.  - F/u Urine Cx: E. Coli, pending sensitivities  - Fluids running at 125/ml/hr  - Ordered Vanc and Merrem  - Lactic Acid 2.4 -> 1.0   - Hold anticoagulation for now. Monitor INR and consider starting  heparin in AM (01/26)     Acute Hypoxic Respiratory Failure - resolved  Requires High Flow Nasal Cannula - now on room air  CXR shows abnormal airspace disease on L nesha-thorax  Azithromycin    Pro-sandra 43.9     Acute Liver Failure  ALK Phos high at 275  ALT high at 63;  AST elevated at 84  Albumin Low at 1.7  Ammonia levels elevated 92 - >  127  Elevated direct and indirect bilirubin 1, and 1.5  Macrocytic Anemia  LFT's are trending down: AST 69, ALT 60  F/u hepatitis panel - Non Reactive  F/u Ultrasound with doppler - pending  F/u anti-SM, anti-BENJAMÍN antibodies - pending     Possible overdose  Received narcan on arrival with somewhat improved mentation  UDS + for oxy, however, patient is prescribed percocet for chronic back pain.   -Negative UDS for all other levels. F/u on acetaminophen level - negative  Salicylate levels w/n/l  F/u on ethanol levels - negative     Troponemia 2/2 NSTEMI II  Suspect type 2 demand ischemia  Elevated on arrival at 0.13  0.13 - > 0.14     Pericardial Effusion  U/S heart shows pericardial effusion  -History of Large free-flowing Pericardial effusion with prominent epicardial fat with LVEF 60-65% from December at [facility]. That visit shows no change from previous imaging. EKG shows low voltage complexes     ALISON on CKD stage 3  Cr of 1.8 on admission  Cr of 1.3 baseline  Fluids on board for septic shock  Continue to monitor     Chronic Problems  Hx of PE - eliquis  MDD - Klonopin    Code Status:No Order  FEN: NPO  PPX:  Famotidine  DISPO: TBD    Lili Akbar DO, PGY-1  01/26/23  8:24 AM    This patient has been staffed and discussed with Dr. Nanci Harris MD    Patient seen, examined and discussed with the resident and I agree with the assessment and plan. Remains delirious and on pressors.   Source of infection is most likely patient's urinary tract system but he also has several wounds on his sacrum and feet. He has grown E. coli in the past so is on antibiotics to cover pending current cultures. Patient also had recent history of pulmonary emboli and was on Eliquis. He had an elevated INR and ammonia on admission concerning for possible liver disease but his elevated INR could be from the Eliquis, in addition his low albumin could be from chronic illness and sepsis. Transaminases were mildly elevated but are improving. He does not carry a history of liver disease to our knowledge that he is too delirious to provide any history for further clarification. He is currently on lactulose to try to bring down the ammonia, although the delirium that he is having could be secondary to sepsis. Patient had elevated procalcitonin on admission but with his chronic kidney disease and urinary tract infection it could be elevated for different reasons the pneumonia. We should be able to stop the azithromycin. Critical care time spent reviewing labs/films, examining patient, collaborating with other physicians but excluding procedures for life threatening organ failure is 41 minutes.       Servando Acosta MD

## 2023-01-26 NOTE — CONSULTS
[facility]  Palliative Medicine Consultation Note      Date Of Admission:1/25/2023  Date of consult: 01/26/23  Seen by [facility] in the past:  No    Recommendations:        Saw pt,  Froilan Alonso and pt's niece at the bed side. The pt is confused, oriented to self and place only - otherwise unable to provide any meaningful history. Spoke with pt's  Froilan Alonso and niece. They endorse decline over the last six months. They report pt mainly stays in his recliner. He can walk with a walker at baseline but has been doing this less recently. They worry about his quality of life, although are hopeful he will recover from his acute illness. We discussed his code status in depth. At this time Froilan Alonso thinks pt would want to remain a full code, although they are open to further discussions if the pt declines. 1. Goals of Care/Advanced Care planning/Code status: Full Code, continue with aggressive management at this time. Pt's legal NOK is his , Froilan Alonso. Will continue to follow for goals of care discussions as pt's condition changes. 2. Pain: pt denies  3. SOB: pt denies, resting comfortably on room air  4. AMS likely 2/2 urosepsis: Pt is oriented x2, talkative but confused. Urine culture showing E Coli. Pt remains on IV abx and jeannine. 5. Disposition: TBD pending hospital course    Reason for Consult:         [x]  Goals of Care  [x]  Code Status Discussion/Advanced Care Planning   []  Psychosocial/Family Support  []  Symptom Management  []  Other (Specify)    Requesting Physician: Dr. Mohit Barton:  AMS    History Obtained From:  patient, electronic medical record    History of Present Illness:         Mari Yoo is a 58 y.o. male with PMH of chronic back pain, PE on eliquis, CKD, DM, HTN, HLD, TIA, stroke, pancreatitis, gastric bypass who presented with altered mental status from home. Of note, he was recently admitted at [facility] in 11-12/2022 for E coli urosepsis,  bilateral PE.  His family reported a clinical decline over the last 6 months, mostly staying in his recliner. Pt was admitted with sepsis, acute liver failure and ALISON. He also was found to have multiple scattered wounds, including a stage 4 sacral decubitus ulcer. Subjective:         Past Medical History:        Diagnosis Date    Bradycardia     Depression     Diabetes mellitus ([address]     no meds    Hyperlipidemia     Hypertension     Osteoarthritis of lumbar spine     Primary osteoarthritis of lumbar spine     Unspecified cerebral artery occlusion with cerebral infarction 2003    TIA    Unspecified cerebral artery occlusion with cerebral infarction        Past Surgical History:        Procedure Laterality Date    BACK SURGERY      COLONOSCOPY N/A 8/9/2019    COLONOSCOPY WITH BIOPSY performed by Yocasta Love MD at [address] [facility], [address]      left knee    TONSILLECTOMY      UPPER GASTROINTESTINAL ENDOSCOPY N/A 8/9/2019    EGD BIOPSY performed by Yocasta Love MD at [facility] ENDOSCOPY       Current Medications:    Medications Prior to Admission: apixaban (ELIQUIS) 5 MG TABS tablet, Take 5 mg by mouth 2 times daily  clonazePAM (KLONOPIN) 1 MG tablet, Take 1 mg by mouth 2 times daily as needed. doxepin (SINEQUAN) 50 MG capsule, Take 150 mg by mouth nightly  furosemide (LASIX) 20 MG tablet, Take 20 mg by mouth daily  latanoprost (XALATAN) 0.005 % ophthalmic solution, Place 1 drop into both eyes nightly  pantoprazole (PROTONIX) 40 MG tablet, Take 40 mg by mouth daily  tiZANidine (ZANAFLEX) 4 MG tablet, Take 4 mg by mouth every 8 hours as needed (muscle spasms)  tamsulosin (FLOMAX) 0.4 MG capsule, Take 0.4 mg by mouth 2 times daily  losartan (COZAAR) 50 MG tablet, Take 50 mg by mouth daily. senna (SENOKOT) 8.6 MG tablet, Take 1 tablet by mouth as needed for Constipation.   docusate sodium (COLACE) 100 MG capsule, Take 100 mg by mouth.  gabapentin (NEURONTIN) 800 MG tablet, Take 800 mg by mouth 3 times daily.  oxyCODONE-acetaminophen (PERCOCET) 7.5-325 MG per tablet, Take 1 tablet by mouth every 12 hours as needed for Pain.  metoprolol succinate (TOPROL XL) 50 MG extended release tablet, Take 50 mg by mouth daily  pravastatin (PRAVACHOL) 10 MG tablet, Take 10 mg by mouth daily. folic acid (FOLVITE) 1 MG tablet, Take 1 mg by mouth daily. montelukast (SINGULAIR) 10 MG tablet, Take 10 mg by mouth nightly. loratadine (CLARITIN) 10 MG tablet, Take 10 mg by mouth daily. therapeutic multivitamin-minerals (THERAGRAN-M) tablet, Take 1 tablet by mouth daily. Allergies:  Patient has no known allergies. Social History:    TOBACCO: reports that he has never smoked. He does not have any smokeless tobacco history on file. ETOH:   reports no history of alcohol use. Patient currently lives with family     Review of Systems -   Review of Systems   Unable to perform ROS: Mental status change   Objective:          Physical Exam  Constitutional:       Appearance: He is ill-appearing. Cardiovascular:      Rate and Rhythm: Tachycardia present. Heart sounds: Normal heart sounds. Pulmonary:      Breath sounds: Normal breath sounds. Abdominal:      General: Bowel sounds are normal.      Palpations: Abdomen is soft. Musculoskeletal:      Right lower leg: Edema present. Left lower leg: Edema present. Skin:     General: Skin is warm and dry. Neurological:      Mental Status: He is alert. He is disoriented. Palliative Performance Scale:  [] 60% Ambulation reduced; Significant disease; Can't do hobbies/housework; intake normal or reduced; occasional assist; LOC full/confusion  [x] 50% Mainly sit/lie; Extensive disease; Can't do any work; Considerable assist; intake normal  Or reduced; LOC full/confusion  [] 40% Mainly in bed; Extensive disease; Mainly assist; intake normal or reduced; occasional assist; LOC full/confusion  [] 30% Bed Bound; Extensive disease;  Total care; intake reduced; LOC full/confusion  [] 20% Bed Bound; Extensive disease; Total care; intake minimal; Drowsy/coma  [] 10% Bed Bound; Extensive disease; Total care; Mouth care only; Drowsy/coma  [] 0% Death    PPS: 50    Vitals:    /69   Pulse (!) 147   Temp 98.8 °F (37.1 °C) (Bladder)   Resp 14   Ht 6' (1.829 m)   Wt 176 lb 5.9 oz (80 kg)   SpO2 100%   BMI 23.92 kg/m²     Labs:    BMP:   Recent Labs     01/25/23  1258 01/26/23  0358    140   K 3.7 3.8    107   CO2 21 22   BUN 32* 29*   CREATININE 1.8* 1.7*   GLUCOSE 84 102*     CBC:   Recent Labs     01/25/23  1258 01/26/23  0358   WBC 18.0* 24.3*   HGB 8.8* 8.8*   HCT 27.6* 27.3*    398       LFT's:   Recent Labs     01/25/23  1258 01/26/23  0358   AST 84* 69*   ALT 63* 60*   BILITOT 1.5* 1.3*   ALKPHOS 275* 322*     Troponin:   Recent Labs     01/26/23  0016 01/26/23  0506 01/26/23  0559   TROPONINI 0.12* 0.13* 0.14*     BNP: No results for input(s): BNP in the last 72 hours. ABGs: No results for input(s): PHART, TNZ2GIC, PO2ART in the last 72 hours. INR:   Recent Labs     01/25/23  1258 01/26/23  0358   INR 2.03* 1.96*       U/A:  Recent Labs     01/25/23  1316   COLORU Yellow   PHUR 6.0  6.0   WBCUA >100*   RBCUA 0-2   BACTERIA 3+*   CLARITYU Clear   SPECGRAV 1.025   LEUKOCYTESUR MODERATE*   UROBILINOGEN 1.0   BILIRUBINUR SMALL*   BLOODU MODERATE*   GLUCOSEU Negative       XR CHEST PORTABLE   Final Result         1. Good position of left IJ central venous catheter in the lower SVC   2. No pneumothorax   3. Bilateral pleural effusions and left greater than right lung airspace disease             CT Head W/O Contrast   Final Result      No evidence of acute intracranial abnormality. XR CHEST PORTABLE   Final Result   1. Abnormal airspace disease throughout the left hemithorax.    2. Follow-up is recommended with specific attention to obtaining a upright PA and lateral chest      US DUP ABD PEL RETRO SCROT COMPLETE    (Results Pending)         Conclusion/Time spent:         Recommendations see above    Time spent with patient and/or family: 40  Time reviewing records: 10 min   Time communicating with staff: 5 min     A total of 55 minutes spent with the patient and family on unit greater than 50% in counseling regarding palliative care and in goals of care for the patient. Thank you to Dr. Rhett Hernandez for this consultation. We will continue to follow Mr. Stallworth Balloon care as needed.       [address] [facility]  Inpatient Palliative Care  [phone]

## 2023-01-26 NOTE — PROGRESS NOTES
After central line placement, patient is more awake and is now tachycardic in the 140's. Has unintelligible speech. Residents made aware and plan on coming to see patient. No new orders at this time.

## 2023-01-26 NOTE — CONSULTS
Department of Podiatry Consult Note  Resident       Reason for Consult: Left foot eschar, right foot DTI  Requesting Physician:  Magno Hall MD    CHIEF COMPLAINT: Foot wounds    HISTORY OF PRESENT ILLNESS:                The patient is a 58 y.o. male with significant past medical history as listed below. Podiatry was consulted for bilateral foot wounds. Patient is a poor historian 2/2 clinical condition. Patient unsure of how long he has had wounds. Patient unsure if any dressing has been applied to the wounds bilateral.  Patient denies pain currently to bilateral lower extremity. Patient denies fever, chills, nausea, vomiting, shortness of breath, chest pain. Patient has no other pedal complaints at this time. Past Medical History:        Diagnosis Date    Bradycardia     Depression     Diabetes mellitus ([address]     no meds    Hyperlipidemia     Hypertension     Osteoarthritis of lumbar spine     Primary osteoarthritis of lumbar spine     Unspecified cerebral artery occlusion with cerebral infarction 2003    TIA    Unspecified cerebral artery occlusion with cerebral infarction        Past Surgical History:        Procedure Laterality Date    BACK SURGERY      COLONOSCOPY N/A 8/9/2019    COLONOSCOPY WITH BIOPSY performed by Funmilayo Joyce MD at [address] [facility],  [address]      left knee    TONSILLECTOMY      UPPER GASTROINTESTINAL ENDOSCOPY N/A 8/9/2019    EGD BIOPSY performed by Funmilayo Joyce MD at [facility] ENDOSCOPY       Allergies:   Patient has no known allergies. Medications:   Home Meds  No current facility-administered medications on file prior to encounter. Current Outpatient Medications on File Prior to Encounter   Medication Sig Dispense Refill    apixaban (ELIQUIS) 5 MG TABS tablet Take 5 mg by mouth 2 times daily      clonazePAM (KLONOPIN) 1 MG tablet Take 1 mg by mouth 2 times daily as needed.       doxepin (SINEQUAN) 50 MG capsule Take 150 mg by mouth nightly      furosemide (LASIX) 20 MG tablet Take 20 mg by mouth daily      latanoprost (XALATAN) 0.005 % ophthalmic solution Place 1 drop into both eyes nightly      pantoprazole (PROTONIX) 40 MG tablet Take 40 mg by mouth daily      tiZANidine (ZANAFLEX) 4 MG tablet Take 4 mg by mouth every 8 hours as needed (muscle spasms)      tamsulosin (FLOMAX) 0.4 MG capsule Take 0.4 mg by mouth 2 times daily      losartan (COZAAR) 50 MG tablet Take 50 mg by mouth daily. senna (SENOKOT) 8.6 MG tablet Take 1 tablet by mouth as needed for Constipation. docusate sodium (COLACE) 100 MG capsule Take 100 mg by mouth.      gabapentin (NEURONTIN) 800 MG tablet Take 800 mg by mouth 3 times daily. oxyCODONE-acetaminophen (PERCOCET) 7.5-325 MG per tablet Take 1 tablet by mouth every 12 hours as needed for Pain.      metoprolol succinate (TOPROL XL) 50 MG extended release tablet Take 50 mg by mouth daily      pravastatin (PRAVACHOL) 10 MG tablet Take 10 mg by mouth daily. folic acid (FOLVITE) 1 MG tablet Take 1 mg by mouth daily. montelukast (SINGULAIR) 10 MG tablet Take 10 mg by mouth nightly. loratadine (CLARITIN) 10 MG tablet Take 10 mg by mouth daily. therapeutic multivitamin-minerals (THERAGRAN-M) tablet Take 1 tablet by mouth daily.            Current Meds  Venelex ointment, BID  phenylephrine (NARENDRA-SYNEPHRINE) 50 mg in sodium chloride 0.9 % 250 mL infusion, Continuous  lactulose (CHRONULAC) 10 GM/15ML solution 10 g, TID  perflutren lipid microspheres (DEFINITY) injection 1.5 mL, ONCE PRN  sodium chloride flush 0.9 % injection 5-40 mL, 2 times per day  sodium chloride flush 0.9 % injection 5-40 mL, PRN  0.9 % sodium chloride infusion, PRN  ondansetron (ZOFRAN-ODT) disintegrating tablet 4 mg, Q8H PRN   Or  ondansetron (ZOFRAN) injection 4 mg, Q6H PRN  polyethylene glycol (GLYCOLAX) packet 17 g, Daily PRN  acetaminophen (TYLENOL) tablet 650 mg, Q6H PRN   Or  acetaminophen (TYLENOL) suppository 650 mg, Q6H PRN  famotidine (PEPCID) 20 mg in sodium chloride (PF) 0.9 % 10 mL injection, Daily  meropenem (MERREM) 1,000 mg in sodium chloride 0.9 % 100 mL IVPB (mini-bag), Q12H  vancomycin (VANCOCIN) 1,250 mg in sodium chloride 0.9 % 250 mL IVPB, Q24H  norepinephrine (LEVOPHED) 16 mg in dextrose 5 % 250 mL infusion, Continuous  azithromycin (ZITHROMAX) 500 mg in sodium chloride 0.9 % 250 mL IVPB, Q24H        Family History:   No family history on file. Social History:   TOBACCO:   reports that he has never smoked. He does not have any smokeless tobacco history on file. ETOH:   reports no history of alcohol use. DRUGS:   reports no history of drug use. REVIEW OF SYSTEMS:    As above. PHYSICAL EXAM:      Vitals:    /64   Pulse (!) 132   Temp 100 °F (37.8 °C) (Bladder)   Resp 13   Ht 6' (1.829 m)   Wt 176 lb 5.9 oz (80 kg)   SpO2 94%   BMI 23.92 kg/m²     LABS:   Recent Labs     01/25/23  1258 01/26/23  0358   WBC 18.0* 24.3*   HGB 8.8* 8.8*   HCT 27.6* 27.3*    398     Recent Labs     01/26/23  0358      K 3.8      CO2 22   BUN 29*   CREATININE 1.7*     Recent Labs     01/25/23  1258 01/26/23  0358   PROT 4.3* 4.2*   INR 2.03* 1.96*         VASCULAR: DP and PT pulses faintly palpable 1/4 bilateral.  Upon hand-held Doppler examination, DP and PT signals biphasic bilateral. CFT is brisk to the digits of the foot b/l. Skin temperature is warm to cool from proximal to distal with no focal calor noted. Scant nonpitting edema noted bilateral. No pain with calf compression b/l. NEUROLOGIC: Gross and epicritic sensation is intact b/l. Protective sensation is diminished at all pedal sites b/l. DERMATOLOGIC:   Right lower extremity:  Full-thickness ulceration noted to the medial aspect of the first metatarsophalangeal joint with overlying devitalized tissue. Deep rubor noted to wound base. No erythema, crepitance, fluctuance, drainage, or malodor noted.     Nonblanchable rubor noted to posterior heel. No open wound noted at this time. No erythema, crepitance, fluctuance, drainage, or malodor noted. Patient provided verbal consent for today's photos. Left lower extremity:  Full-thickness ulceration noted to the lateral aspect of the fifth metatarsal head. Wound base necrotic well adhered eschar with hyperkeratotic periwound. No fluctuance, crepitus, erythema, drainage, or malodor noted. Wound does not probe to bone, tunnel, or track. Nonblanchable rubor noted to the posterior heel. No open wound noted. No fluctuance, crepitus, erythema, drainage, or malodor noted. MUSCULOSKELETAL: Muscle strength is 3/5 for all pedal groups tested. Tenderness with palpation of the periwound areas bilateral feet. Ankle joint ROM is decreased in dorsiflexion with the knee extended. No obvious biomechanical abnormalities. IMAGING:  X-ray bilateral foot pending    IMPRESSION/RECOMMENDATIONS:      -Diabetic foot ulceration, right medial first MPJ, Cassidy 2  -Diabetic foot ulceration, left lateral fifth metatarsal, Cassidy 2  -Deep tissue pressure injury, bilateral heels  -Diabetes mellitus type 2 with peripheral neuropathy    -Patient examined and evaluated at the bedside   -Tachycardic, afebrile, BP within normal on Levophed and Azeem-Synephrine. Leukocytosis noted (WBC 24.3). -ESR, CRP, prealbumin ordered  -X-rays ordered  -Blood culture 1 (1/25/2023): Streptococcus  -Patient provided verbal consent for excisional debridement. Utilizing #10 blade, excisional debridement was performed to the right lower extremity medial first MPJ wound down to and including subcutaneous tissues. Less than 2 cc bleeding noted. Hemostasis achieved with direct pressure. Patient tolerated procedure well.   -Dressing applied to bilateral lower extremity consisting of Betadine soaked gauze, dry gauze, Kerlix, and loose Ace bandage  -Lorne ordered to the room for dressing change tomorrow.  -Antibiotics not indicated from podiatric standpoint at this time  -Weightbearing as tolerated bilateral lower extremity      DISPO: Diabetic foot ulcerations bilateral.  Awaiting labs and imaging for further treatment plan.     - The patient will be staffed with Dr. Shaniqua Martinez DPM.    Flo Moran DPM  01/26/23  12:04 PM

## 2023-01-26 NOTE — PLAN OF CARE
Problem: Discharge Planning  Goal: Discharge to home or other facility with appropriate resources  Outcome: Progressing  Flowsheets (Taken 1/25/2023 6195 by Georgette Mitchell RN)  Discharge to home or other facility with appropriate resources:   Identify barriers to discharge with patient and caregiver   Arrange for needed discharge resources and transportation as appropriate   Identify discharge learning needs (meds, wound care, etc)   Arrange for interpreters to assist at discharge as needed   Refer to discharge planning if patient needs post-hospital services based on physician order or complex needs related to functional status, cognitive ability or social support system     Problem: Pain  Goal: Verbalizes/displays adequate comfort level or baseline comfort level  Outcome: Progressing     Problem: Safety - Adult  Goal: Free from fall injury  Outcome: Progressing     Problem: Skin/Tissue Integrity  Goal: Absence of new skin breakdown  Description: 1. Monitor for areas of redness and/or skin breakdown  2. Assess vascular access sites hourly  3. Every 4-6 hours minimum:  Change oxygen saturation probe site  4. Every 4-6 hours:  If on nasal continuous positive airway pressure, respiratory therapy assess nares and determine need for appliance change or resting period.   Outcome: Progressing

## 2023-01-26 NOTE — PROGRESS NOTES
Hospitalist Progress Note      PCP: Josephine Durant MD    Date of Admission: 1/25/2023      Subjective: seen and examined  Still delirious  On pressors       Medications:  Reviewed    Infusion Medications    phenylephrine (NARENDRA-SYNEPHRINE) 50 mg/250 mL infusion Stopped (01/26/23 1526)    heparin (PORCINE) Infusion      sodium chloride      norepinephrine Stopped (01/26/23 1216)     Scheduled Medications    Venelex   Topical BID    lactulose  10 g Oral TID    collagenase   Topical Daily    silver nitrate applicators   Topical Once    heparin (porcine)  80 Units/kg IntraVENous Once    sodium chloride flush  5-40 mL IntraVENous 2 times per day    famotidine (PEPCID) injection  20 mg IntraVENous Daily    meropenem  1,000 mg IntraVENous Q12H    vancomycin  1,250 mg IntraVENous Q24H     PRN Meds: prochlorperazine **OR** prochlorperazine, heparin (porcine), heparin (porcine), perflutren lipid microspheres, sodium chloride flush, sodium chloride, polyethylene glycol, acetaminophen **OR** acetaminophen      Intake/Output Summary (Last 24 hours) at 1/26/2023 1756  Last data filed at 1/26/2023 1716  Gross per 24 hour   Intake 1171.5 ml   Output 1335 ml   Net -163.5 ml       Physical Exam Performed:    /71   Pulse (!) 129   Temp 99.5 °F (37.5 °C) (Bladder)   Resp 20   Ht 6' (1.829 m)   Wt 176 lb 5.9 oz (80 kg)   SpO2 97%   BMI 23.92 kg/m²     General appearance: No apparent distress, appears stated age and confused  HEENT: Pupils equal, round, and reactive to light. Conjunctivae/corneas clear. Neck: Supple, with full range of motion. No jugular venous distention. Trachea midline. Respiratory: b/l crackles   Cardiovascular: Regular rate and rhythm with normal S1/S2 without murmurs, rubs or gallops. Abdomen: Soft, non-tender, non-distended with normal bowel sounds. Musculoskeletal:b/l leg edema  Skin: Skin color, texture, turgor normal.  No rashes or lesions.   Neurologic:  not oriented  Psychiatric: Alert and oriented, thought content appropriate, normal insight  Capillary Refill: Brisk, 3 seconds, normal   Peripheral Pulses: +2 palpable, equal bilaterally       Labs:   Recent Labs     01/25/23  1258 01/26/23  0358   WBC 18.0* 24.3*   HGB 8.8* 8.8*   HCT 27.6* 27.3*    398     Recent Labs     01/25/23  1258 01/26/23  0358    140   K 3.7 3.8    107   CO2 21 22   BUN 32* 29*   CREATININE 1.8* 1.7*   CALCIUM 7.0* 7.0*     Recent Labs     01/25/23  1258 01/26/23  0358   AST 84* 69*   ALT 63* 60*   BILIDIR 1.0* 0.9*   BILITOT 1.5* 1.3*   ALKPHOS 275* 322*     Recent Labs     01/25/23  1258 01/26/23  0358   INR 2.03* 1.96*     Recent Labs     01/26/23  0559 01/26/23  1130 01/26/23  1511   TROPONINI 0.14* 0.18* 0.20*       Urinalysis:      Lab Results   Component Value Date/Time    NITRU Negative 01/25/2023 01:16 PM    WBCUA >100 01/25/2023 01:16 PM    BACTERIA 3+ 01/25/2023 01:16 PM    RBCUA 0-2 01/25/2023 01:16 PM    BLOODU MODERATE 01/25/2023 01:16 PM    SPECGRAV 1.025 01/25/2023 01:16 PM    GLUCOSEU Negative 01/25/2023 01:16 PM       Radiology:  XR CHEST PORTABLE   Final Result         1. Good position of left IJ central venous catheter in the lower SVC   2. No pneumothorax   3. Bilateral pleural effusions and left greater than right lung airspace disease             POC US ECHOCARDIO TRANSTHORACIC LTD   Final Result      CT Head W/O Contrast   Final Result      No evidence of acute intracranial abnormality. XR CHEST PORTABLE   Final Result   1. Abnormal airspace disease throughout the left hemithorax.    2. Follow-up is recommended with specific attention to obtaining a upright PA and lateral chest      US DUP ABD PEL RETRO SCROT COMPLETE    (Results Pending)   XR FOOT LEFT (MIN 3 VIEWS)    (Results Pending)   XR FOOT RIGHT (MIN 3 VIEWS)    (Results Pending)       IP CONSULT TO CRITICAL CARE  IP CONSULT TO HOSPITALIST  IP CONSULT TO PHARMACY  IP CONSULT TO PALLIATIVE CARE  IP CONSULT TO GENERAL SURGERY  IP CONSULT TO PODIATRY    Assessment/Plan:    Active Hospital Problems    Diagnosis     Delirium [R41.0]      Priority: Medium    Septic shock ([facility] [address] [A41.9, R65.21]      Priority: Medium    Sepsis ([facility] [address] [A41.9]      Priority: Medium     Acute metabolic encephalopathy  Severe sepsis  Complicated UTI likely sec to urinary retention, recent o/p catheterization 1/4/2023  Prob acute on chronic urinary retention  ALISON on CKD stage 3  Recent Bilateral PE 12/2022 hx on eliquis  Acute on chronic anemia  Chronic pain, narcotic dependent  Pericardial effusion: moderate  Chronic anxiety, benzo dependent  Elevated liver enzymes, mixed pattern   Morbid obesity s/p gastric bypass  Sacral decubitus ulcer stage IV       His encephalopathy is likely multifactorial from sepsis and possible hepatic encephalopathy     Pxt has obvious features of sepsis, with source likely sec to UTI. He also has elevated liver enzymes as well as elevated NH3 level. Unclear if he has a hx of chronic liver disease     CT A/P from 12/2022 shows \"normal liver\" but overall picture suggests chronic liver disease, possible cirrhosis     Reviewed records re recent ED and office visits at [facility] from late last year and early this year. Reviewed echo from 12/2022  Repeat echo: Moderate circumferential pericardial effusion, no tamponade     Reviewed CT chest, CT A/P from 12/2022              Admit to ICU     Blood and urine culture     Broad spectrum antibx: Alvarado and morganm     Place Joseph     US RUQ to eval hepatobiliary system, suspect possible cirrhosis     Check Vit B12, TSH     Check Hepatitis panel and HIV screen     Continue AC if no C/I. Monitor hgb. If cannot take orally, and no bleeding, we will place on hep gtt. Pxt is very ill with high risk of deterioration  and need for mech ventilation, pressor support; high risk for morbidity and mortality.      Palliative care consult    DVT Prophylaxis: eliquis  Diet: ADULT DIET; Full Liquid  Code Status: Full Code  PT/OT Eval Status: n/a     Dispo - icu      William Sahu MD

## 2023-01-26 NOTE — PROGRESS NOTES
Pt A/Ox2 - disoriented to time and situation. Pupil equal and reactive. Following commands appropriately. Sinus tachy, 130s. Levo and jeannine infusing. Room air, satting >95%. Bowel sounds present. Joseph in place with gary/ hazy urine. Temp: 100.4 - PRN tylenol given. , Peg Nate, and niece at bedside. Call light within reach, bed alarm on with bed locked/ in lowest position.

## 2023-01-26 NOTE — PROCEDURES
Excisional Debridement of Decubitus Ulcer    Indication for Procedure:  Patient with a stage 4 decubitus ulcer 7 cm x 4.5 cm x 2 cm requiring mechanical debridement of non-viable tissue to permit healing/recovery. Pre-Procedure:   Written informed consent was provided by , Nany Cuello, after discussion of risks and benefits of the procedure, including the answering of all questions to the patient's stated satisfaction. The image below was taken immediately prior to debridement:        Procedure:  Patient was identified by stated name, hospital-provided identification number, and wrist-band confirmation. Time-out was called immediately prior to positioning and all present were in agreement. Patient was then rolled into the R lateral decubitus position for ideal exposure. The skin was prepped and draped in the usual sterile manner, 1% lidocaine with epinephrine was used to anesthetize the wound edges for increased pain control. An #11 blade scalpel was used to excise the non-viable tissue overlying the wound. After the excision of eschar/non-viable tissue, wound was found to be stage 4. Additional fibrotic tissue was excised down to and including presacral fascia until the wound bed was composed of viable tissue, evidenced by the presence of bleeding. Wound was 7 cm x 4.5 cm x 2 cm after debridement. Pressure was applied to the area to ensure that blood loss was kept to a minimum. Kerlix gauze was then moistened with sterile saline and packed into the wound bed, followed by covering of the area with a sacral heart dressing. Patient tolerated the procedure well. Image below is immediately post-debridement:      Post-Procedure:  Patient to be kept in a flat supine position for 2 hours status post-debridement to provide additional pressure for hemostasis. Please contact the surgery team should the patient have bleeding that does not stop soon after discovery.  Patient will be followed to monitor progress. Dressing will be changed BID.       Raz Byrd DO, [address]  PGY-1, General Surgery  01/26/23  3:04 PM  [employer]  Pager: [phone]

## 2023-01-26 NOTE — DISCHARGE INSTR - COC
Continuity of Care Form    Patient Name: Ariel Osler   :  1960  MRN:  [id number]    Admit date:  2023  Discharge date:  ***    Code Status Order: Full Code   Advance Directives:     Admitting Physician:  Shahrzad Iraheta MD  PCP: Jewels Lopes MD    Discharging Nurse: [facility] Unit/Room#: 0391/1618-23  Discharging Unit Phone Number: ***    Emergency Contact:   Extended Emergency Contact Information  Primary Emergency Contact: Hans Rodriguez  Address: [address] [place] [address], [facility] [address] [facility] Phone: [phone]  Mobile Phone: [phone]  Relation: Spouse    Past Surgical History:  Past Surgical History:   Procedure Laterality Date    BACK SURGERY      COLONOSCOPY N/A 2019    COLONOSCOPY WITH BIOPSY performed by Leidy Hardin MD at [facility],  [address]      left knee    TONSILLECTOMY      UPPER GASTROINTESTINAL ENDOSCOPY N/A 2019    EGD BIOPSY performed by Leidy Hardin MD at [facility] ENDOSCOPY       Immunization History:   Immunization History   Administered Date(s) Administered    COVID-19, PFIZER Bivalent BOOSTER, DO NOT Dilute, (age 12y+), IM, 30 mcg/0.3 mL 2022    COVID-19, PFIZER GRAY top, DO NOT Dilute, (age 15 y+), IM, 30 mcg/0.3 mL 07/15/2022    COVID-19, PFIZER PURPLE top, DILUTE for use, (age 15 y+), 30mcg/0.3mL 2021, 2021, 10/15/2021       Active Problems:  Patient Active Problem List   Diagnosis Code    Sepsis ([facility] [address] A41.9       Isolation/Infection:   Isolation            No Isolation          Patient Infection Status       Infection Onset Added Last Indicated Last Indicated By Review Planned Expiration Resolved Resolved By    None active    Resolved    COVID-19 (Rule Out) 23 Respiratory Panel, Molecular, with COVID-19 (Restricted: peds pts or suitable admitted adults) (Ordered)   23 Rule-Out Test Resulted            Nurse Assessment:  Last Vital Signs: /64   Pulse (!) 132   Temp 99.5 °F (37.5 °C) (Bladder)   Resp 13   Ht 6' (1.829 m)   Wt 176 lb 5.9 oz (80 kg)   SpO2 94%   BMI 23.92 kg/m²     Last documented pain score (0-10 scale): Pain Level: 0  Last Weight:   Wt Readings from Last 1 Encounters:   23 176 lb 5.9 oz (80 kg)     Mental Status:  {IP PT MENTAL STATUS:65835}    IV Access:  { TERRELL IV ACCESS:871743887}    Nursing Mobility/ADLs:  Walking   {P DME ZOGQ:562509531}  Transfer  {[facility] DME MGWI:928202283}  Bathing  {[facility] DME CVZK:141106072}  Dressing  {P DME QAZT:701980603}  Toileting  {[facility] DME NXSD:636628843}  Feeding  {[facility] DME VKRK:786882132}  Med Admin  {[facility] DME QKOW:698981870}  Med Delivery   { TERRELL MED Delivery:977181145}    Wound Care Documentation and Therapy:  Wound 12 Abrasion(s) Back Mid;Left (Active)   Number of days: 3937        Elimination:  Continence: Bowel: {YES / VC:10724}  Bladder: {YES / MF:56098}  Urinary Catheter: {Urinary Catheter:907496612}   Colostomy/Ileostomy/Ileal Conduit: {YES / FZ:14380}       Date of Last BM: ***    Intake/Output Summary (Last 24 hours) at 2023 1330  Last data filed at 2023 1100  Gross per 24 hour   Intake 4228.06 ml   Output 1675 ml   Net 2553.06 ml     I/O last 3 completed shifts:   In: 3806.7 [I.V.:1104.7; IV Piggyback:2702]  Out: 1425 [Urine:1425]    Safety Concerns:     [address] Safety Concerns:496337780}    Impairments/Disabilities:      [address] Impairments/Disabilities:752293281}    Nutrition Therapy:  Current Nutrition Therapy:   [address] Diet List:370999756}    Routes of Feeding: {[facility] DME Other Feedings:920308702}  Liquids: {Slp liquid thickness:94923}  Daily Fluid Restriction: {CHP DME Yes amt example:992490212}  Last Modified Barium Swallow with Video (Video Swallowing Test): {Done Not Done CQIF:035784809}    Treatments at the Time of Hospital Discharge:   Respiratory Treatments: ***  Oxygen Therapy:  {Therapy; copd oxygen:26630}  Ventilator:    {MH CC Vent UFY}    Rehab Therapies: {THERAPEUTIC INTERVENTION:5468796057}  Weight Bearing Status/Restrictions: {[facility] Weight Bearin}  Other Medical Equipment (for information only, NOT a DME order):  {EQUIPMENT:055607227}  Other Treatments: ***    Patient's personal belongings (please select all that are sent with patient):  {CHP DME Belongings:199601216}    RN SIGNATURE:  {Esignature:031357140}    CASE MANAGEMENT/SOCIAL WORK SECTION    Inpatient Status Date: ***    Readmission Risk Assessment Score:  Readmission Risk              Risk of Unplanned Readmission:  18           Discharging to Facility/ Agency   Name:   Address:  Phone:  Fax:    Dialysis Facility (if applicable)   Name:  Address:  Dialysis Schedule:  Phone:  Fax:    / signature: {Esignature:769129943}    PHYSICIAN SECTION    Prognosis: {Prognosis:9975945409}    Condition at Discharge: [address] [place] [address] Patient Condition:999942907}    Rehab Potential (if transferring to Rehab): {Prognosis:6217052406}    Recommended Labs or Other Treatments After Discharge: ***    Podiatry Wound Care Discharge Instructions  Please perform every day dressing changes to right lower extremity as follows  -Apply Betadine to the wounds on the right lower leg  -Next apply gauze to the top of the right foot, ankle, and leg  -Next loosely wrap the right lower extremity with Kerlix starting from just in front of the toes and ending just below the knee  -Next gently wrap the right foot with 4 inch Ace bandage starting from just in front of the toes and ending just above the ankle  -Next gently wrap the right leg with 6 inch Ace bandage starting from just above the ankle and ending just below the knee    Please perform every day dressing changes to left lower extremity as follows  -Apply Santyl to the wound on the outside of the left foot  -Next apply saline moistened gauze over the santyl  -Next apply gauze to the top of the left foot, ankle, and leg  -Next loosely wrap the left lower extremity with Kerlix starting from just in front of the toes and ending just below the knee  -Next gently wrap the left foot with 4 inch Ace bandage starting from just in front of the toes and ending just above the ankle  -Next gently wrap the left leg with 6 inch Ace bandage starting from just above the ankle and ending just below the knee    Patient is weightbearing as tolerated Bilateral lower extremity  Please follow-up with [address] [place] [address] podiatry or with your previous podiatrist within 1 week of hospital discharge       Physician Certification: I certify the above information and transfer of Natalya Garza  is necessary for the continuing treatment of the diagnosis listed and that he requires {Admit to Appropriate Level of Care:29236} for {GREATER/LESS:580856366} 30 days.      Update Admission H&P: {[facility]ME [facility]785[facility]67[facility]}    PHYSICIAN SIGNATURE:  {Esignature:938258366}

## 2023-01-26 NOTE — PROGRESS NOTES
Patient has remained lethargic throughout the night. Has multiple wounds scattered over body. Will need wound care consult. Patient is currently requiring 22 mcg/hr of Levophed. Patient has sustained eye opening to speech, Patients speech remains unintelligible.

## 2023-01-26 NOTE — CONSULTS
General Surgery   Resident Consult Note    Reason for Consult: stage 4 decubitus ulcer with fibrinous slough    History of Present Illness:   Kimberlee Abbott is a 58 y.o. male with Hx of chronic back pain, pulmonary embolism (12/2022) on Eliquis, CKD, DM, HTN, HLD, TIA, stroke, pancreatitis, gastric bypass who presented with altered mental status from home. He was recently admitted at [facility] in 11-12/2022. Per chart review, the patient has been declining significantly in the past 6 months. His memory has started to worsen as well as his ability for activities of daily living, including mostly staying in his recliner. Patient was admitted on 01/25 for acute metabolic encephalopathy, severe sepsis, ALISON on CKD stage 3, and acute liver failure. He was also found to have multiple scattered wounds, including a stage 4 sacral decubitus ulcer. He was evaluated for this sacral ulcer back in 11/30/2022 and no surgical debridement was done at that time. Instead, they used triad hydro paste to wound daily as needed and left it open to air. He has been going to wound care clinic since then and have been applying Triad paste over silver alginate daily. Last wound care visit was 01/10/2023. Last Eliquis dose 01/25 1830. Per nurse, patient has not had any bowel movements since admission so unsure whether or not patient is currently incontinent of his bowels. He is currently on Vanc and Merrem of E. Coli urosepsis which is believed may be the cause of his altered mental status. Blood culture positive for Streptococcus.     Past Medical History:        Diagnosis Date    Bradycardia     Depression     Diabetes mellitus ([facility] [address]     no meds    Hyperlipidemia     Hypertension     Osteoarthritis of lumbar spine     Primary osteoarthritis of lumbar spine     Unspecified cerebral artery occlusion with cerebral infarction 2003    TIA    Unspecified cerebral artery occlusion with cerebral infarction        Past Surgical History:        Procedure Laterality Date    BACK SURGERY      COLONOSCOPY N/A 8/9/2019    COLONOSCOPY WITH BIOPSY performed by Domenic Tomlin MD at [address] [facility], [address]      left knee    TONSILLECTOMY      UPPER GASTROINTESTINAL ENDOSCOPY N/A 8/9/2019    EGD BIOPSY performed by Domenic Tomlin MD at [facility] ENDOSCOPY       Allergies:  Patient has no known allergies. Medications:   Home Meds  No current facility-administered medications on file prior to encounter. Current Outpatient Medications on File Prior to Encounter   Medication Sig Dispense Refill    apixaban (ELIQUIS) 5 MG TABS tablet Take 5 mg by mouth 2 times daily      clonazePAM (KLONOPIN) 1 MG tablet Take 1 mg by mouth 2 times daily as needed. doxepin (SINEQUAN) 50 MG capsule Take 150 mg by mouth nightly      furosemide (LASIX) 20 MG tablet Take 20 mg by mouth daily      latanoprost (XALATAN) 0.005 % ophthalmic solution Place 1 drop into both eyes nightly      pantoprazole (PROTONIX) 40 MG tablet Take 40 mg by mouth daily      tiZANidine (ZANAFLEX) 4 MG tablet Take 4 mg by mouth every 8 hours as needed (muscle spasms)      tamsulosin (FLOMAX) 0.4 MG capsule Take 0.4 mg by mouth 2 times daily      losartan (COZAAR) 50 MG tablet Take 50 mg by mouth daily. senna (SENOKOT) 8.6 MG tablet Take 1 tablet by mouth as needed for Constipation. docusate sodium (COLACE) 100 MG capsule Take 100 mg by mouth.      gabapentin (NEURONTIN) 800 MG tablet Take 800 mg by mouth 3 times daily. oxyCODONE-acetaminophen (PERCOCET) 7.5-325 MG per tablet Take 1 tablet by mouth every 12 hours as needed for Pain.      metoprolol succinate (TOPROL XL) 50 MG extended release tablet Take 50 mg by mouth daily      pravastatin (PRAVACHOL) 10 MG tablet Take 10 mg by mouth daily. folic acid (FOLVITE) 1 MG tablet Take 1 mg by mouth daily. montelukast (SINGULAIR) 10 MG tablet Take 10 mg by mouth nightly.         loratadine (CLARITIN) 10 MG tablet Take 10 mg by mouth daily. therapeutic multivitamin-minerals (THERAGRAN-M) tablet Take 1 tablet by mouth daily. Current Meds  Venelex ointment, BID  phenylephrine (NARENDRA-SYNEPHRINE) 50 mg in sodium chloride 0.9 % 250 mL infusion, Continuous  lactulose (CHRONULAC) 10 GM/15ML solution 10 g, TID  perflutren lipid microspheres (DEFINITY) injection 1.5 mL, ONCE PRN  sodium chloride flush 0.9 % injection 5-40 mL, 2 times per day  sodium chloride flush 0.9 % injection 5-40 mL, PRN  0.9 % sodium chloride infusion, PRN  ondansetron (ZOFRAN-ODT) disintegrating tablet 4 mg, Q8H PRN   Or  ondansetron (ZOFRAN) injection 4 mg, Q6H PRN  polyethylene glycol (GLYCOLAX) packet 17 g, Daily PRN  acetaminophen (TYLENOL) tablet 650 mg, Q6H PRN   Or  acetaminophen (TYLENOL) suppository 650 mg, Q6H PRN  famotidine (PEPCID) 20 mg in sodium chloride (PF) 0.9 % 10 mL injection, Daily  meropenem (MERREM) 1,000 mg in sodium chloride 0.9 % 100 mL IVPB (mini-bag), Q12H  vancomycin (VANCOCIN) 1,250 mg in sodium chloride 0.9 % 250 mL IVPB, Q24H  norepinephrine (LEVOPHED) 16 mg in dextrose 5 % 250 mL infusion, Continuous  azithromycin (ZITHROMAX) 500 mg in sodium chloride 0.9 % 250 mL IVPB, Q24H        Family History:   No family history on file. Social History:   TOBACCO:   reports that he has never smoked. He does not have any smokeless tobacco history on file. ETOH:   reports no history of alcohol use. DRUGS:   reports no history of drug use. Review of Systems:   A 14 point review of systems was conducted, significant findings as noted in HPI. All other systems negative.      Physical exam:    Vitals:    01/26/23 1040 01/26/23 1050 01/26/23 1100 01/26/23 1200   BP: 105/68 108/63 110/64    Pulse: (!) 138 (!) 136 (!) 132    Resp: 18 14 13    Temp:    99.5 °F (37.5 °C)   TempSrc:    Bladder   SpO2:       Weight:       Height:         General appearance: Alert, no acute distress, grooming appropriate  Eyes: No scleral icterus, EOM grossly intact  Neck: Trachea midline, no JVD, no lymphadenopathy, neck supple  Chest/Lungs: Normal effort with no accessory muscle use on RA  Cardiovascular: RRR, no murmurs/gallops/rubs, S1 and S2 noted, well perfused  Abdomen: Soft, non-tender, non-distended, no rebound, guarding, or rigidity. Skin: Warm and dry, no rashes  Extremities: No edema, no cyanosis  Genitourinary: Stage 4 decubitus ulcer [see image below]  Neuro: A&Ox3, no focal deficits, sensation intact    Sacral wound: stage 4 ulcer (7 cm x 4.5 cm x 2 cm); yellow slough at wound base, necrotic tissue and induration at the wound edges, no purulence/drainage noted, no odor; minimal surrounding erythema      Labs:    CBC:   Recent Labs     01/25/23  1258 01/26/23  0358   WBC 18.0* 24.3*   HGB 8.8* 8.8*   HCT 27.6* 27.3*   .5* 101.7*    398     BMP:   Recent Labs     01/25/23  1258 01/26/23  0358    140   K 3.7 3.8    107   CO2 21 22   BUN 32* 29*   CREATININE 1.8* 1.7*     PT/INR:   Recent Labs     01/25/23  1258 01/26/23  0358   PROTIME 23.0* 22.4*   INR 2.03* 1.96*     APTT: No results for input(s): APTT in the last 72 hours.   Liver Profile:   Lab Results   Component Value Date/Time    AST 69 01/26/2023 03:58 AM    ALT 60 01/26/2023 03:58 AM    BILIDIR 0.9 01/26/2023 03:58 AM    BILITOT 1.3 01/26/2023 03:58 AM    ALKPHOS 322 01/26/2023 03:58 AM   No results found for: CHOL, HDL, TRIG  UA:   Lab Results   Component Value Date/Time    COLORU Yellow 01/25/2023 01:16 PM    PHUR 6.0 01/25/2023 01:16 PM    PHUR 6.0 01/25/2023 01:16 PM    WBCUA >100 01/25/2023 01:16 PM    RBCUA 0-2 01/25/2023 01:16 PM    BACTERIA 3+ 01/25/2023 01:16 PM    CLARITYU Clear 01/25/2023 01:16 PM    SPECGRAV 1.025 01/25/2023 01:16 PM    LEUKOCYTESUR MODERATE 01/25/2023 01:16 PM    UROBILINOGEN 1.0 01/25/2023 01:16 PM    BILIRUBINUR SMALL 01/25/2023 01:16 PM    BLOODU MODERATE 01/25/2023 01:16 PM    GLUCOSEU Negative 01/25/2023 01:16 PM       Imaging: XR CHEST PORTABLE   Final Result         1. Good position of left IJ central venous catheter in the lower SVC   2. No pneumothorax   3. Bilateral pleural effusions and left greater than right lung airspace disease             POC US ECHOCARDIO TRANSTHORACIC LTD   Final Result      CT Head W/O Contrast   Final Result      No evidence of acute intracranial abnormality. XR CHEST PORTABLE   Final Result   1. Abnormal airspace disease throughout the left hemithorax. 2. Follow-up is recommended with specific attention to obtaining a upright PA and lateral chest      US DUP ABD PEL RETRO SCROT COMPLETE    (Results Pending)     Assessment/Plan: This is a 58 y.o. male with Hx of chronic back pain, pulmonary embolism (12/2022) on Eliquis, CKD, DM, HTN, HLD, TIA, stroke, pancreatitis, gastric bypass who presented with altered mental status from home. General surgery was consulted because of a known stage 4 sacral ulcer for which he follows with  wound care clinic. The ulcer requires debridement at this time as there is some necrotic tissue and slough at the wound base. Patient has been off Eliquis since 01/25.    - Patient is unable to consent for himself at this time due to altered mental status. Will consent POA for bedside debridement  - Will assess daily wound care needs after debridement  - Follow-up wound care RN recommendations    The patient was seen by senior resident and discussed with Dr. Patrick Durant.     [address] [facility],   01/26/23  12:21 PM

## 2023-01-26 NOTE — PROGRESS NOTES
Patient is tachycardic in the 140s. ICU residents made aware and assessed patient at bedside. No new orders at this time.

## 2023-01-26 NOTE — PROGRESS NOTES
Wound care at bedside. All wounds assessed. Podiatry consulted for bilateral feet and general surgery consulted for sacral wound.

## 2023-01-27 LAB
ALBUMIN SERPL-MCNC: 1.5 G/DL (ref 3.4–5)
ALP BLD-CCNC: 287 U/L (ref 40–129)
ALT SERPL-CCNC: 50 U/L (ref 10–40)
AMMONIA: 101 UMOL/L (ref 16–60)
ANION GAP SERPL CALCULATED.3IONS-SCNC: 9 MMOL/L (ref 3–16)
APTT: 105.8 SEC (ref 23–34.3)
APTT: 63.5 SEC (ref 23–34.3)
AST SERPL-CCNC: 51 U/L (ref 15–37)
BASOPHILS ABSOLUTE: 0 K/UL (ref 0–0.2)
BASOPHILS RELATIVE PERCENT: 0.1 %
BILIRUB SERPL-MCNC: 1.1 MG/DL (ref 0–1)
BILIRUBIN DIRECT: 0.6 MG/DL (ref 0–0.3)
BILIRUBIN, INDIRECT: 0.5 MG/DL (ref 0–1)
BUN BLDV-MCNC: 26 MG/DL (ref 7–20)
CALCIUM SERPL-MCNC: 6.9 MG/DL (ref 8.3–10.6)
CHLORIDE BLD-SCNC: 110 MMOL/L (ref 99–110)
CO2: 21 MMOL/L (ref 21–32)
CREAT SERPL-MCNC: 1.2 MG/DL (ref 0.8–1.3)
EOSINOPHILS ABSOLUTE: 0.1 K/UL (ref 0–0.6)
EOSINOPHILS RELATIVE PERCENT: 0.4 %
ESTIMATED AVERAGE GLUCOSE: 82.5 MG/DL
GFR SERPL CREATININE-BSD FRML MDRD: >60 ML/MIN/{1.73_M2}
GLUCOSE BLD-MCNC: 100 MG/DL (ref 70–99)
HBA1C MFR BLD: 4.5 %
HCT VFR BLD CALC: 21.8 % (ref 40.5–52.5)
HEMOGLOBIN: 7 G/DL (ref 13.5–17.5)
INR BLD: 1.82 (ref 0.87–1.14)
L. PNEUMOPHILA SEROGP 1 UR AG: NORMAL
LYMPHOCYTES ABSOLUTE: 1.5 K/UL (ref 1–5.1)
LYMPHOCYTES RELATIVE PERCENT: 9.2 %
MAGNESIUM: 1.9 MG/DL (ref 1.8–2.4)
MCH RBC QN AUTO: 33.5 PG (ref 26–34)
MCHC RBC AUTO-ENTMCNC: 32.1 G/DL (ref 31–36)
MCV RBC AUTO: 104.5 FL (ref 80–100)
MONOCYTES ABSOLUTE: 1 K/UL (ref 0–1.3)
MONOCYTES RELATIVE PERCENT: 5.8 %
NEUTROPHILS ABSOLUTE: 13.9 K/UL (ref 1.7–7.7)
NEUTROPHILS RELATIVE PERCENT: 84.5 %
ORGANISM: ABNORMAL
PDW BLD-RTO: 14.9 % (ref 12.4–15.4)
PLATELET # BLD: 272 K/UL (ref 135–450)
PMV BLD AUTO: 7.8 FL (ref 5–10.5)
POTASSIUM REFLEX MAGNESIUM: 3.5 MMOL/L (ref 3.5–5.1)
PROTHROMBIN TIME: 21 SEC (ref 11.7–14.5)
RBC # BLD: 2.09 M/UL (ref 4.2–5.9)
REASON FOR REJECTION: NORMAL
REJECTED TEST: NORMAL
SODIUM BLD-SCNC: 140 MMOL/L (ref 136–145)
STREP PNEUMONIAE ANTIGEN, URINE: NORMAL
TOTAL PROTEIN: 3.7 G/DL (ref 6.4–8.2)
TROPONIN: 0.17 NG/ML
TROPONIN: 0.18 NG/ML
URINE CULTURE, ROUTINE: ABNORMAL
VANCOMYCIN RANDOM: 15.2 UG/ML
WBC # BLD: 16.5 K/UL (ref 4–11)

## 2023-01-27 PROCEDURE — 2580000003 HC RX 258: Performed by: STUDENT IN AN ORGANIZED HEALTH CARE EDUCATION/TRAINING PROGRAM

## 2023-01-27 PROCEDURE — 85730 THROMBOPLASTIN TIME PARTIAL: CPT

## 2023-01-27 PROCEDURE — 6360000002 HC RX W HCPCS: Performed by: STUDENT IN AN ORGANIZED HEALTH CARE EDUCATION/TRAINING PROGRAM

## 2023-01-27 PROCEDURE — 80202 ASSAY OF VANCOMYCIN: CPT

## 2023-01-27 PROCEDURE — 2060000000 HC ICU INTERMEDIATE R&B

## 2023-01-27 PROCEDURE — 82140 ASSAY OF AMMONIA: CPT

## 2023-01-27 PROCEDURE — 2500000003 HC RX 250 WO HCPCS

## 2023-01-27 PROCEDURE — 2580000003 HC RX 258

## 2023-01-27 PROCEDURE — 6370000000 HC RX 637 (ALT 250 FOR IP)

## 2023-01-27 PROCEDURE — 85025 COMPLETE CBC W/AUTO DIFF WBC: CPT

## 2023-01-27 PROCEDURE — 80048 BASIC METABOLIC PNL TOTAL CA: CPT

## 2023-01-27 PROCEDURE — 80076 HEPATIC FUNCTION PANEL: CPT

## 2023-01-27 PROCEDURE — 84484 ASSAY OF TROPONIN QUANT: CPT

## 2023-01-27 PROCEDURE — A4216 STERILE WATER/SALINE, 10 ML: HCPCS

## 2023-01-27 PROCEDURE — 99233 SBSQ HOSP IP/OBS HIGH 50: CPT | Performed by: INTERNAL MEDICINE

## 2023-01-27 PROCEDURE — 6370000000 HC RX 637 (ALT 250 FOR IP): Performed by: STUDENT IN AN ORGANIZED HEALTH CARE EDUCATION/TRAINING PROGRAM

## 2023-01-27 PROCEDURE — 36415 COLL VENOUS BLD VENIPUNCTURE: CPT

## 2023-01-27 PROCEDURE — 85610 PROTHROMBIN TIME: CPT

## 2023-01-27 PROCEDURE — 83735 ASSAY OF MAGNESIUM: CPT

## 2023-01-27 PROCEDURE — 6370000000 HC RX 637 (ALT 250 FOR IP): Performed by: INTERNAL MEDICINE

## 2023-01-27 PROCEDURE — 6360000002 HC RX W HCPCS

## 2023-01-27 PROCEDURE — 6360000002 HC RX W HCPCS: Performed by: INTERNAL MEDICINE

## 2023-01-27 PROCEDURE — 94761 N-INVAS EAR/PLS OXIMETRY MLT: CPT

## 2023-01-27 PROCEDURE — 6370000000 HC RX 637 (ALT 250 FOR IP): Performed by: PODIATRIST

## 2023-01-27 RX ORDER — POTASSIUM CHLORIDE 29.8 MG/ML
20 INJECTION INTRAVENOUS
Status: COMPLETED | OUTPATIENT
Start: 2023-01-27 | End: 2023-01-27

## 2023-01-27 RX ORDER — DOXEPIN HYDROCHLORIDE 25 MG/1
150 CAPSULE ORAL NIGHTLY
Status: DISCONTINUED | OUTPATIENT
Start: 2023-01-27 | End: 2023-01-27

## 2023-01-27 RX ORDER — THIAMINE HYDROCHLORIDE 100 MG/ML
100 INJECTION, SOLUTION INTRAMUSCULAR; INTRAVENOUS DAILY
Status: DISCONTINUED | OUTPATIENT
Start: 2023-01-27 | End: 2023-02-03

## 2023-01-27 RX ORDER — LANOLIN ALCOHOL/MO/W.PET/CERES
3 CREAM (GRAM) TOPICAL NIGHTLY
Status: DISCONTINUED | OUTPATIENT
Start: 2023-01-27 | End: 2023-02-04 | Stop reason: HOSPADM

## 2023-01-27 RX ORDER — MAGNESIUM SULFATE IN WATER 40 MG/ML
2000 INJECTION, SOLUTION INTRAVENOUS ONCE
Status: COMPLETED | OUTPATIENT
Start: 2023-01-27 | End: 2023-01-27

## 2023-01-27 RX ORDER — QUETIAPINE FUMARATE 25 MG/1
12.5 TABLET, FILM COATED ORAL EVERY 12 HOURS
Status: COMPLETED | OUTPATIENT
Start: 2023-01-27 | End: 2023-01-27

## 2023-01-27 RX ORDER — DOXEPIN HYDROCHLORIDE 25 MG/1
150 CAPSULE ORAL NIGHTLY
Status: DISCONTINUED | OUTPATIENT
Start: 2023-01-27 | End: 2023-02-04 | Stop reason: HOSPADM

## 2023-01-27 RX ADMIN — CLONAZEPAM 1 MG: 1 TABLET ORAL at 21:09

## 2023-01-27 RX ADMIN — Medication: at 21:00

## 2023-01-27 RX ADMIN — QUETIAPINE FUMARATE 12.5 MG: 25 TABLET ORAL at 21:10

## 2023-01-27 RX ADMIN — LACTULOSE 10 G: 10 SOLUTION ORAL at 09:09

## 2023-01-27 RX ADMIN — MAGNESIUM SULFATE HEPTAHYDRATE 2000 MG: 40 INJECTION, SOLUTION INTRAVENOUS at 10:27

## 2023-01-27 RX ADMIN — DOXEPIN HYDROCHLORIDE 150 MG: 25 CAPSULE ORAL at 21:09

## 2023-01-27 RX ADMIN — MEROPENEM 1000 MG: 1 INJECTION, POWDER, FOR SOLUTION INTRAVENOUS at 09:05

## 2023-01-27 RX ADMIN — CLONAZEPAM 1 MG: 1 TABLET ORAL at 03:10

## 2023-01-27 RX ADMIN — HEPARIN SODIUM 3200 UNITS: 1000 INJECTION INTRAVENOUS; SUBCUTANEOUS at 11:09

## 2023-01-27 RX ADMIN — Medication: at 09:24

## 2023-01-27 RX ADMIN — CEFTRIAXONE 2000 MG: 2 INJECTION, POWDER, FOR SOLUTION INTRAMUSCULAR; INTRAVENOUS at 11:17

## 2023-01-27 RX ADMIN — POTASSIUM CHLORIDE 20 MEQ: 400 INJECTION, SOLUTION INTRAVENOUS at 12:19

## 2023-01-27 RX ADMIN — THIAMINE HYDROCHLORIDE 100 MG: 100 INJECTION, SOLUTION INTRAMUSCULAR; INTRAVENOUS at 21:09

## 2023-01-27 RX ADMIN — LACTULOSE 10 G: 10 SOLUTION ORAL at 14:15

## 2023-01-27 RX ADMIN — SODIUM CHLORIDE, PRESERVATIVE FREE 20 MG: 5 INJECTION INTRAVENOUS at 09:09

## 2023-01-27 RX ADMIN — POTASSIUM CHLORIDE 20 MEQ: 400 INJECTION, SOLUTION INTRAVENOUS at 09:23

## 2023-01-27 RX ADMIN — Medication 3 MG: at 21:09

## 2023-01-27 RX ADMIN — COLLAGENASE SANTYL: 250 OINTMENT TOPICAL at 09:24

## 2023-01-27 ASSESSMENT — PAIN SCALES - GENERAL
PAINLEVEL_OUTOF10: 0

## 2023-01-27 NOTE — CONSULTS
Vancomycin has been discontinued. Pharmacy will sign off consult. If medication dosing is resumed, please re-consult pharmacy. Please call with any questions.   Irina Varner PharmD, BCPS  Main pharmacy: [id number]  1/27/2023 10:06 AM

## 2023-01-27 NOTE — PROGRESS NOTES
Clinical Pharmacy Progress Note    Vancomycin - Management by Pharmacy    Consult Date(s): 1/25/23  Consulting Provider(s): Dr.Alexander Washburn    Assessment / Plan  Sepsis of unknown - Vancomycin  Concurrent Antimicrobials:  Meropenem (Day 3/7)  Day of Vanc Therapy / Ordered Duration: (Day 3/7)  Current Dosing Method: Bayesian-Guided AUC Dosing  Therapeutic Goal: -600 mg/L*hr  Current Dose / Plan:   SCr this morning slightly improved from 1.8->1.7->1.2 this AM (baseline ~1.2 per labs 01/03/23). UOP over last 24 hours ~ 0.6 ml/kg/hr. Random level drawn ~15.5 hr after prior dose resulted at 15.2 mg/L. Current regimen is vancomycin 1250 mg IV every 24 hours  Kinetics estimate  ml/kg. hr and trough 11.4 mg/L  Given estimated kinetics are on the lower end of the goal, will increase patient's regimen to vancomycin 1500 mg IV every 24 hours  Kinetics estimate  mg/L.hr and trough 13.6 mg/L. Will order a random level in about 48 hours to confirm kinetics or sooner if clinically indicated. Will continue to monitor clinical condition and make adjustments to regimen as appropriate. Thank you for consulting Pharmacy! Annita Peterson, PharmD  PGY-1 Pharmacy Resident  [facility]  [id number]  1/27/2023   8:02 AM        Interval Update: Patient continues to have altered mental status this morning, but has improved from the day before and he is s/p wound bedside debridement 01/26. Patient afebrile over the last 24 hours and remains tachycardic. Procalcitonin 43.9 and WBC trend 18->24.3->16.5. Subjective/Objective:   Roland Valle is a 58 y.o. male with a past medical history significant for chronic back pain (on multiple medications), pulmonary emboli (on Eliquis), CKD, gallstones, DM, HTN, HLD, TIA, stroke and recent history of urosepsis (hospitalized at [facility] 11/2022) who is admitted with Sepsis . Pharmacy is consulted to dose vancomycin.     Ht Readings from Last 1 Encounters: 01/25/23 6' (1.829 m)     Wt Readings from Last 1 Encounters:   01/27/23 176 lb 5.9 oz (80 kg)     Current & Prior Antimicrobial Regimen(s):  Meropenem EI (01/25-Current)  Vancomycin PTD (01/25-Current)  2000 mg IV x1 LD (01/25)  1250 mg IV every 24 hours (01/25-Current)     Level(s) / Doses:    Date Time Dose Type of Level / Level Interpretation   01/27 0538 1250 mg IV q24h Random= 15.2 mg/L -Level drawn ~15.5 hr after prior dose  -Estimated SJW=384, trough=11.4          Note: Serum levels collected for AUC-based dosing may be high if collected in close proximity to the dose administered. This is not necessarily indicative of toxicity. Cultures & Sensitivities:    Date Site Micro Susceptibility / Result   01/25 Blood 1/2 Strep species    01/25 Blood 1/2 NGTD    01/25 Urine Cx E coli Pansensitive, Intermediate nitrofurantoin   01/25 Legionella urine In process    01/26 Strep pneumo urine In process    01/26 Resp Panel Not detected      Recent Labs     01/25/23  1258 01/26/23  0358 01/27/23  0538   CREATININE 1.8* 1.7* 1.2   BUN 32* 29* 26*   WBC 18.0* 24.3* 16.5*         Estimated Creatinine Clearance: 70 mL/min (based on SCr of 1.2 mg/dL).     Additional Lab Values / Findings of Note:    Recent Labs     01/25/23  1619   PROCAL 43.88*

## 2023-01-27 NOTE — PROGRESS NOTES
General Surgery   Daily Progress Note  Patient: Mari Yoo      CC: stage 4 sacral decubitus ulcer    SUBJECTIVE:   Patient rested well overnight. Denies pain on his sacrum. Tolerating diet without nausea or vomiting. Voiding appropriately without issues. Having bowel movements and passing gas. Dressing changed and wound cleaned this AM.    ROS:   A 14 point review of systems was conducted, significant findings as noted above. All other systems negative. OBJECTIVE:    PHYSICAL EXAM:    Vitals:    01/27/23 0100 01/27/23 0200 01/27/23 0300 01/27/23 0400   BP: 111/73 113/68 113/67 113/71   Pulse: (!) 119 (!) 116 (!) 118 (!) 112   Resp: 17 15 22 14   Temp:    97.9 °F (36.6 °C)   TempSrc:    Bladder   SpO2:    98%   Weight:       Height:         General appearance: Alert, no acute distress, grooming appropriate  Eyes: No scleral icterus, EOM grossly intact  Neck: Trachea midline, no JVD, no lymphadenopathy, neck supple  Chest/Lungs: Normal effort with no accessory muscle use on RA  Cardiovascular: RRR, S1 and S2 noted, well perfused  Abdomen: Soft, non-tender, non-distended, no rebound, guarding, or rigidity.   Skin: Warm and dry, no rashes  Extremities: No edema, no cyanosis  Genitourinary: Stage 4 decubitus ulcer [see image below]  Neuro: A&Ox3, no focal deficits, sensation intact  Sacral wound: stage 4 ulcer (7 cm x 4.5 cm x 2 cm); hemostatic        LABS:   Recent Labs     01/25/23  1258 01/26/23  0358   WBC 18.0* 24.3*   HGB 8.8* 8.8*   HCT 27.6* 27.3*   .5* 101.7*    398          Recent Labs     01/25/23  1258 01/26/23  0358    140   K 3.7 3.8    107   CO2 21 22   BUN 32* 29*   CREATININE 1.8* 1.7*          Recent Labs     01/25/23  1258 01/26/23  0358   AST 84* 69*   ALT 63* 60*   BILIDIR 1.0* 0.9*   BILITOT 1.5* 1.3*   ALKPHOS 275* 322*          Recent Labs     01/25/23  1258   LIPASE 4.0*          Recent Labs     01/25/23  1258 01/26/23  0358 01/26/23  1511 01/26/23  2253   PROT 4. 3* 4.2*  --   --    INR 2.03* 1.96*  --   --    APTT  --   --  41.8* >180.0*          Recent Labs     01/26/23  2333 01/27/23  0327   TROPONINI 0.18* 0.18*         ASSESSMENT & PLAN:   This is a 58 y.o. male with Hx of chronic back pain, pulmonary embolism (12/2022) on Eliquis, CKD, DM, HTN, HLD, TIA, stroke, pancreatitis, gastric bypass who presented with altered mental status from home. General surgery is following for a known stage 4 sacral ulcer now s/p bedside debridement (01/26).     - Continue W-to-D Kerlix to wound and cover with sacral heart BID  - Continue Hep gtt  - Awaiting recommendations from wound care RN  - Will continue to follow      Fransisco Webb DO, [address] [facility]  PGY1, General Surgery  01/27/23  6:26 AM  [place]  Pager: [phone] ambulatory

## 2023-01-27 NOTE — PROGRESS NOTES
General Surgery   Daily Progress Note  Patient: Linda Bartholomew      CC: stage 4 sacral decubitus ulcer    SUBJECTIVE:   Patient rested well overnight. Denies pain on his sacrum. Tolerating diet without nausea or vomiting. Voiding appropriately without issues. Not having bowel movements at this time. ROS:   A 14 point review of systems was conducted, significant findings as noted above. All other systems negative. OBJECTIVE:    PHYSICAL EXAM:    Vitals:    01/27/23 0100 01/27/23 0200 01/27/23 0300 01/27/23 0400   BP: 111/73 113/68 113/67 113/71   Pulse: (!) 119 (!) 116 (!) 118 (!) 112   Resp: 17 15 22 14   Temp:    97.9 °F (36.6 °C)   TempSrc:    Bladder   SpO2:    98%   Weight:       Height:         General appearance: Alert, no acute distress, grooming appropriate  Eyes: No scleral icterus, EOM grossly intact  Neck: Trachea midline, no JVD, no lymphadenopathy, neck supple  Chest/Lungs: Normal effort with no accessory muscle use on RA  Cardiovascular: RRR, well-perfused  Abdomen: Soft, non-tender, non-distended, no rebound, guarding, or rigidity.   Skin: Warm and dry, no rashes  Extremities: No edema, no cyanosis  Genitourinary: Stage 4 decubitus ulcer [see image below]  Neuro: A&Ox3, no focal deficits, sensation intact    Sacral wound: stage 4 ulcer (7 cm x 4.5 cm x 2 cm); hemostatic, no purulence or odor        LABS:   Recent Labs     01/25/23  1258 01/26/23  0358   WBC 18.0* 24.3*   HGB 8.8* 8.8*   HCT 27.6* 27.3*   .5* 101.7*    398        Recent Labs     01/25/23  1258 01/26/23  0358    140   K 3.7 3.8    107   CO2 21 22   BUN 32* 29*   CREATININE 1.8* 1.7*        Recent Labs     01/25/23  1258 01/26/23  0358   AST 84* 69*   ALT 63* 60*   BILIDIR 1.0* 0.9*   BILITOT 1.5* 1.3*   ALKPHOS 275* 322*        Recent Labs     01/25/23  1258   LIPASE 4.0*        Recent Labs     01/25/23  1258 01/26/23  0358 01/26/23  1511 01/26/23  7513   PROT 4.3* 4.2*  --   --    INR 2.03* 1.96*  --   -- APTT  --   --  41.8* >180.0*        Recent Labs     01/26/23  2333 01/27/23  0327   TROPONINI 0.18* 0.18*       ASSESSMENT & PLAN:   This is a 58 y.o. male with Hx of chronic back pain, pulmonary embolism (12/2022) on Eliquis, CKD, DM, HTN, HLD, TIA, stroke, pancreatitis, gastric bypass who presented with altered mental status from home. General surgery is following for a known stage 4 sacral ulcer now s/p bedside debridement (01/26).     - Continue W-to-D Kerlix to wound and cover with sacral heart BID  - Continue Hep gtt  - Awaiting recommendations from wound care RN  - Will continue to follow      Romana Gaul, DO, [address] [facility]  PGY1, General Surgery  01/27/23  5:36 AM  [place]  Pager: [phone]

## 2023-01-27 NOTE — PROGRESS NOTES
Palliative Care Chart Review  and Check in Note:     NAME:  Carlos Knight  Admit Date: 1/25/2023  Hospital Day:  Hospital Day: 3   Current Code status: Full Code    Palliative care is continuing to following Mr. Mally Cuba for symptom management,  and goals of care discussion as needed. Patient's chart reviewed today 1/27/23. Met with pt and  at the bedside. The pt is more alert today, in good spirits and wants to work with PT/OT. We discussed completing a HCPOA today however he is still too confused. The following are the currently established goals/code status, and Symptom management. Goals of care: Continue with current management at this time. Pt/ hopeful that he will improve to baseline and take initiative to improve his functional status. Today pt is stating he wants to get out of bed and work with PT/OT.      Code status: Full Code    Discharge plan: TBD pending hospital course      Roland Harkins, DEWAYNE - CNP  01/27/23  10:53 AM

## 2023-01-27 NOTE — PROGRESS NOTES
ICU Progress Note    Admit Date: 1/25/2023  Day: 2  Vent Day: None  IV Access: [facility] 1/25/2023    Diet: ADULT DIET; Full Liquid  ADULT ORAL NUTRITION SUPPLEMENT; Breakfast, Dinner; Wound Healing Oral Supplement    CC: AMS    Interval history:   - NAEO  - Pt mentation improving overnight, pt is alert and oriented to himself, place and date  - Continue to monitor symptoms and continue abx therapy  - Heparin gtt has started due to hx of PE    HPI:   Mr. Sherryle Greenland is a 58 y.o. M with PMH of chronic back pain, pulmonary emboli on eliquis, CKD, gallstones, DM, HTN, HLD, TIA, stroke, pancreatitis, gastric bypass surgery who presents with altered mental status from home. He was last known normal 13 hours prior to admission (11 PM 1/25). Pt. Cannot contribute history. Pt. Was found in home by person in house who found him. I spoke with patient's  and niece, who are both very close with him. They state that the patient should be full code however patient has noted in the past that he does not want to be intubated for any extended period of time.  states the patient does not drink alcohol, does not smoke, does not do any illicit drugs. Patient's  and niece both state that the patient has been declining significantly in the past 6 months. His memory has started to worsen as well as his ability for activities of daily living. At [facility] in the end of Nov both blood Cx show staph epidermidis susceptible to vanc and bactrim. Urine Cx + for E. Coli and susceptible to multiple mediations. During same visit, the patient was found to have UTI, L lower extremity cellulitis, end organ damage shock and sacral decubitus ulcer. Per chart review, Tizanidine, Percocet, Gabapentin are all prescribed for low back pain. Per note from 1/19 at [facility] health; pain is worse and he wants to decrease the dose of meds so that the patient can take it more frequently. Pt. Also takes Seroquel, clonopin, and doxepin. Pt.  Also takes tamsulosin for BPH. In the ED vanc and zosyn were given. Narcan was also given. Pending acetaminophen and salicylate levels. During same visit, the patient was found to have UTI, L lower extremity cellulitis, end organ damage shock and sacral decubitus ulcer.     Medications:     Scheduled Meds:   doxepin  150 mg Oral Nightly    vancomycin  1,500 mg IntraVENous Q24H    meropenem  1,000 mg IntraVENous Q8H    Venelex   Topical BID    lactulose  10 g Oral TID    collagenase   Topical Daily    silver nitrate applicators   Topical Once    sodium chloride flush  5-40 mL IntraVENous 2 times per day    famotidine (PEPCID) injection  20 mg IntraVENous Daily     Continuous Infusions:   phenylephrine (NARENDRA-SYNEPHRINE) 50 mg/250 mL infusion Stopped (01/26/23 1526)    heparin (PORCINE) Infusion 15 Units/kg/hr (01/27/23 0136)    sodium chloride      norepinephrine Stopped (01/26/23 1216)     PRN Meds:prochlorperazine **OR** prochlorperazine, heparin (porcine), heparin (porcine), clonazePAM, perflutren lipid microspheres, sodium chloride flush, sodium chloride, polyethylene glycol, acetaminophen **OR** acetaminophen    Objective:   Vitals:   T-max:  Patient Vitals for the past 8 hrs:   BP Temp Temp src Pulse Resp SpO2 Weight   01/27/23 0840 -- -- -- (!) 132 15 -- --   01/27/23 0700 -- -- -- (!) 114 13 -- --   01/27/23 0600 103/68 -- -- (!) 102 14 -- 176 lb 5.9 oz (80 kg)   01/27/23 0500 99/65 -- -- (!) 106 14 -- --   01/27/23 0400 113/71 97.9 °F (36.6 °C) Bladder (!) 112 14 98 % --   01/27/23 0300 113/67 -- -- (!) 118 22 -- --   01/27/23 0200 113/68 -- -- (!) 116 15 -- --   01/27/23 0100 111/73 -- -- (!) 119 17 -- --         Intake/Output Summary (Last 24 hours) at 1/27/2023 0845  Last data filed at 1/27/2023 0400  Gross per 24 hour   Intake 1454.42 ml   Output 1230 ml   Net 224.42 ml         Review of Systems   Unable to perform ROS: Acuity of condition     Physical Exam  Constitutional:       Appearance: He is ill-appearing and toxic-appearing. HENT:      Head: Normocephalic and atraumatic. Eyes:      Extraocular Movements: Extraocular movements intact. Pupils: Pupils are equal, round, and reactive to light. Cardiovascular:      Rate and Rhythm: Regular rhythm. Tachycardia present. Pulses: Normal pulses. Heart sounds: Normal heart sounds. Pulmonary:      Effort: Pulmonary effort is normal.      Breath sounds: Normal breath sounds. Abdominal:      General: Abdomen is flat. Palpations: Abdomen is soft. Skin:     General: Skin is warm and dry. Neurological:      Mental Status: He is alert and oriented to person, place, and time. Comments: Continues to talk in a tangential form         LABS:    CBC:   Recent Labs     01/25/23  1258 01/26/23  0358 01/27/23  0538   WBC 18.0* 24.3* 16.5*   HGB 8.8* 8.8* 7.0*   HCT 27.6* 27.3* 21.8*    398 272   .5* 101.7* 104.5*       Renal:    Recent Labs     01/25/23  1258 01/26/23  0358 01/27/23  0538    140 140   K 3.7 3.8 3.5    107 110   CO2 21 22 21   BUN 32* 29* 26*   CREATININE 1.8* 1.7* 1.2   GLUCOSE 84 102* 100*   CALCIUM 7.0* 7.0* 6.9*   MG  --  1.60* 1.90   ANIONGAP 13 11 9       Hepatic:   Recent Labs     01/25/23  1258 01/26/23  0358 01/27/23  0538   AST 84* 69* 51*   ALT 63* 60* 50*   BILITOT 1.5* 1.3* 1.1*   BILIDIR 1.0* 0.9* 0.6*   PROT 4.3* 4.2* 3.7*   LABALBU 1.7* 1.8* 1.5*   ALKPHOS 275* 322* 287*       Troponin:   Recent Labs     01/26/23  2333 01/27/23  0327 01/27/23  0538   TROPONINI 0.18* 0.18* 0.17*       BNP: No results for input(s): BNP in the last 72 hours. Lipids: No results for input(s): CHOL, HDL in the last 72 hours. Invalid input(s): LDLCALCU, TRIGLYCERIDE  ABGs:  No results for input(s): PHART, AZP2AIK, PO2ART, SHC8LUQ, BEART, THGBART, O0EQUHON, RBO6NGA in the last 72 hours.     INR:   Recent Labs     01/25/23  1258 01/26/23  0358 01/27/23  0745   INR 2.03* 1.96* 1.82*       Lactate: No results for input(s): LACTATE in the last 72 hours. Cultures:  -----------------------------------------------------------------  RAD:   US DUP ABD PEL RETRO SCROT COMPLETE   Final Result      1. Patent portal veins, hepatic artery and hepatic veins. XR FOOT LEFT (MIN 3 VIEWS)   Final Result      1. No definite evidence of active osteomyelitis. XR FOOT RIGHT (MIN 3 VIEWS)   Final Result      1. No definite evidence of active osteomyelitis. XR CHEST PORTABLE   Final Result         1. Good position of left IJ central venous catheter in the lower SVC   2. No pneumothorax   3. Bilateral pleural effusions and left greater than right lung airspace disease             POC US ECHOCARDIO TRANSTHORACIC LTD   Final Result      CT Head W/O Contrast   Final Result      No evidence of acute intracranial abnormality. XR CHEST PORTABLE   Final Result   1. Abnormal airspace disease throughout the left hemithorax. 2. Follow-up is recommended with specific attention to obtaining a upright PA and lateral chest            Assessment/Plan:   Mr. Anitra Aleman is a 58 y.o. M with PMH of chronic back pain, pulmonary emboli on eliquis, CKD, gallstones, DM, HTN, HLD, TIA, stroke, pancreatitis, gastric bypass surgery who presents with altered mental status from home. Altered Mental Status 2/2 Urosepsis  Pt has a hx of sepsis from UTI  Home Medications include Seroquel, clonopin, and doxepin  Pt. Hypotensive and tachycardic on arrival.  CT Head shows no acute abnormality.   U/A + for Leukocyte esterase and over 100 WBC.  - F/u Urine Cx: E. Coli  - Fluids running at 125/ml/hr  - Vanc and Merrem  - Heparin gtt started     Acute Hypoxic Respiratory Failure - resolved  Requires High Flow Nasal Cannula - now on room air  CXR shows abnormal airspace disease on L nesha-thorax  Azithromycin has been discontinued    Pro-sandra 43.9     Acute Liver Failure  ALK Phos high at 275  ALT high at 63;  AST elevated at 84  Albumin Low at 1.7  Ammonia levels elevated 92 - >  127  Elevated direct and indirect bilirubin 1, and 1.5  Macrocytic Anemia  LFT's are trending down: AST 51, ALT 50  F/u hepatitis panel - Non Reactive  F/u Ultrasound with doppler - negative for cirrhosis  F/u anti-SM, anti-BENJAMÍN antibodies - negative     Possible overdose - unlikely   Received narcan on arrival with somewhat improved mentation  UDS + for oxy, however, patient is prescribed percocet for chronic back pain.   -Negative UDS for all other levels. F/u on acetaminophen level - negative  Salicylate levels w/n/l  F/u on ethanol levels - negative     Troponemia 2/2 NSTEMI II - resolved  Suspect type 2 demand ischemia  Elevated on arrival at 0.13  0.13 - > 0.14 -> 0.20 -> 0.17  Troponin is down trending and no longer need to be trended     Pericardial Effusion  U/S heart shows pericardial effusion  -History of Large free-flowing Pericardial effusion with prominent epicardial fat with LVEF 60-65% from December at [facility]. That visit shows no change from previous imaging. EKG shows low voltage complexes     ALISON on CKD stage 3 - ALISON resolved  Cr of 1.8 on admission  Cr of 1.3 baseline  Fluids on board for septic shock  Continue to monitor  Cr of 1.2     Chronic Problems  Hx of PE - eliquis  MDD - Klonopin    Code Status: No Order  FEN: Full Liquids  PPX:  Famotidine, Heparin  DISPO: ICU    Parmjit Rosario DO, PGY-1  01/27/23  8:45 AM    This patient has been staffed and discussed with Dr. Endy Kim MD    Patient seen, examined and discussed with the resident and I agree with the assessment and plan as edited above. Patient is more alert today but remains a bit confused. When asked if anything was bothering him he said \"his niece\". She was at the bedside and agreed. His speech is more intelligible today but he remains somewhat tangential.  Patient states that he remembers yesterday but starts talking about things that did not happen.   He also states that whether we transfer him out of the ICU today or not he is going home tomorrow which I strongly recommended against.  He has been able to stay off of vasopressors with fluid resuscitation. He is markedly volume overloaded but likely was intravascularly deplete. His renal function is improving as is his hepatic function. Ammonia level is slightly lower today as well with its unclear why it was elevated in the first place as he does not have a history of liver disease. His on appropriate antibiotics for the bacteria that he has grown. Patient is no longer critically ill but he remains very chronically ill. We will transfer out of ICU today and hopefully he is able to sustain improvement. Continue heparin drip for recent PEs until his renal and hepatic injuries have resolved.       Xiomara Barrientos MD

## 2023-01-27 NOTE — PROGRESS NOTES
Night shift RN sent aPTT at 0745. Called lab to make sure the lab was being run, lab confirmed. RN waited for result, called again twice after still not receiving result. On third call lab informed RN they had received two labels (7599 and 0745) for the aPTT so they \"just slapped one of the labels on it\" which was the 0538 label. RN had been waiting on 0745 label to be run and resulted. Since the wrong sitcker was placed on the result, RN was not able to change heparin gtt until 1100. Gtt now at 17 with 3,200 unit bolus given.

## 2023-01-27 NOTE — PLAN OF CARE
Problem: Discharge Planning  Goal: Discharge to home or other facility with appropriate resources  Outcome: Progressing  Flowsheets (Taken 1/27/2023 0800)  Discharge to home or other facility with appropriate resources: Identify barriers to discharge with patient and caregiver     Problem: Pain  Goal: Verbalizes/displays adequate comfort level or baseline comfort level  Outcome: Progressing  Flowsheets (Taken 1/27/2023 0400 by Doe De Santiago RN)  Verbalizes/displays adequate comfort level or baseline comfort level: Assess pain using appropriate pain scale     Problem: Safety - Adult  Goal: Free from fall injury  Outcome: Progressing     Problem: Skin/Tissue Integrity  Goal: Absence of new skin breakdown  Description: 1. Monitor for areas of redness and/or skin breakdown  2. Assess vascular access sites hourly  3. Every 4-6 hours minimum:  Change oxygen saturation probe site  4. Every 4-6 hours:  If on nasal continuous positive airway pressure, respiratory therapy assess nares and determine need for appliance change or resting period.   Outcome: Progressing     Problem: Chronic Conditions and Co-morbidities  Goal: Patient's chronic conditions and co-morbidity symptoms are monitored and maintained or improved  Outcome: Progressing     Problem: ABCDS Injury Assessment  Goal: Absence of physical injury  Outcome: Progressing     Problem: Nutrition Deficit:  Goal: Optimize nutritional status  Outcome: Progressing

## 2023-01-27 NOTE — PROGRESS NOTES
Podiatric Surgery Daily Progress Note  Taylor Umanzor      Subjective :   Patient seen and examined this am at the bedside. Patient denies any acute overnight events. Patient denies N/V/F/C/SOB. Patient denies calf pain, thigh pain, chest pain. Review of Systems: A 12 point review of symptoms is unremarkable with the exception of the chief complaint. Patient specifically denies nausea, fever, vomiting, chills, shortness of breath, chest pain, abdominal pain, constipation or difficulty urinating. Objective     /68   Pulse (!) 132   Temp 97.9 °F (36.6 °C) (Bladder)   Resp 15   Ht 6' (1.829 m)   Wt 176 lb 5.9 oz (80 kg)   SpO2 98%   BMI 23.92 kg/m²      I/O:  Intake/Output Summary (Last 24 hours) at 1/27/2023 0859  Last data filed at 1/27/2023 0400  Gross per 24 hour   Intake 1454.42 ml   Output 1230 ml   Net 224.42 ml              Wt Readings from Last 3 Encounters:   01/27/23 176 lb 5.9 oz (80 kg)   08/09/19 228 lb (103.4 kg)   03/17/17 231 lb (104.8 kg)       LABS:    Recent Labs     01/26/23  0358 01/27/23  0538   WBC 24.3* 16.5*   HGB 8.8* 7.0*   HCT 27.3* 21.8*    272        Recent Labs     01/27/23  0538      K 3.5      CO2 21   BUN 26*   CREATININE 1.2        Recent Labs     01/26/23  0358 01/26/23  1511 01/26/23  2253 01/27/23  0538 01/27/23  0745   PROT 4.2*  --   --  3.7*  --    INR 1.96*  --   --   --  1.82*   APTT  --    < > >180.0* 63.5*  --     < > = values in this interval not displayed. LOWER EXTREMITY EXAMINATION    Dressing to bilateral LE intact. No strikethrough noted to the external dressing. No drainage noted to the internal layers of the dressing. VASCULAR: DP and PT pulses faintly palpable 1/4 bilateral.  Upon hand-held Doppler examination, DP and PT signals biphasic bilateral. CFT is brisk to the digits of the foot b/l. Skin temperature is warm to cool from proximal to distal with no focal calor noted.   Scant nonpitting edema noted bilateral. No pain with calf compression b/l. NEUROLOGIC: Gross and epicritic sensation is intact b/l. Protective sensation is diminished at all pedal sites b/l. DERMATOLOGIC:   Right lower extremity:  Full-thickness ulceration noted to the medial aspect of the first metatarsophalangeal joint with overlying devitalized tissue. Deep rubor noted to wound base. No erythema, crepitance, fluctuance, drainage, or malodor noted. Nonblanchable rubor noted to posterior heel. No open wound noted at this time. No erythema, crepitance, fluctuance, drainage, or malodor noted. Patient provided verbal consent for today's photos. Left lower extremity:  Full-thickness ulceration noted to the lateral aspect of the fifth metatarsal head. Wound base necrotic well adhered eschar with hyperkeratotic periwound. No fluctuance, crepitus, erythema, drainage, or malodor noted. Wound does not probe to bone, tunnel, or track. Nonblanchable rubor noted to the posterior heel. No open wound noted. No fluctuance, crepitus, erythema, drainage, or malodor noted. MUSCULOSKELETAL: Muscle strength is 3/5 for all pedal groups tested. Tenderness with palpation of the periwound areas bilateral feet. Ankle joint ROM is decreased in dorsiflexion with the knee extended. No obvious biomechanical abnormalities. IMAGING:  X-ray left foot 1/26/2023  Narrative   EXAM: XR FOOT LEFT (MIN 3 VIEWS)       INDICATION: wound lateral 5th met head,       COMPARISON: 2005       FINDINGS:       Deformity of the first toe with fusion of the interphalangeal joint, the previously demonstrated screw, and deformity of the head of the first metatarsal and arthrosis. Deformity of the second toe also present with arthrosis of the second    metatarsophalangeal joint and resorption of the distal aspect of the proximal phalanx. Dressing overlies the foot laterally.  No definite erosion or periosteal reaction to suggest osteomyelitis. Soft tissue swelling present. Moderate-sized plantar calcaneal bone spur. Impression       1. No definite evidence of active osteomyelitis. X-ray right foot 1/26/2023  Narrative   EXAM: XR FOOT RIGHT (MIN 3 VIEWS)       INDICATION: wound medial 1st met head,       COMPARISON: None       FINDINGS:       Scoliosis fixed the interphalangeal joint of the first toe. Mild arthrosis of the first metatarsophalangeal joint. Mild adjacent soft tissue swelling. No definite evidence of active osteomyelitis. Plantar calcaneal bone spur. Impression       1. No definite evidence of active osteomyelitis. ASSESSMENT/PLAN  -Diabetic foot ulceration, right medial first MPJ, Cassidy 2  -Diabetic foot ulceration, left lateral fifth metatarsal, Cassidy 2  -Deep tissue pressure injury, bilateral heels  -Diabetes mellitus type 2 with peripheral neuropathy     -Patient examined and evaluated at the bedside   -Tachycardic, afebrile, BP within normal. Leukocytosis noted (WBC 16.5). -ESR <1, .1  -Prealbumin 3.7; ordered wound healing nutritional supplement  -X-rays reviewed, noted above  -Blood culture 1 (1/25/2023): Streptococcus  -Dressing applied to right lower extremity consisting of Betadine soaked gauze, dry gauze, Kerlix, and loose Ace bandage  -Dressing applied left lower extremity consisting of Santyl, saline soaked gauze, dry gauze, Kerlix, and loose Ace bandage  -Antibiotics not indicated from podiatric standpoint at this time  -Weightbearing as tolerated bilateral lower extremity  -Podiatry will follow patient peripherally. Nursing communication placed for daily dressing changes to bilateral lower extremity as of above. DISPO: Diabetic foot ulcerations bilateral; stable. Labs and imaging reviewed. No antibiotics indicated from podiatric standpoint. Podiatry will follow patient peripherally.   Nursing communication placed for daily dressing changes to bilateral lower extremity.       Patient assessment and plan discussed with Dr. Rebeka Boland DPM.    Jamil Gutierrez DPM  01/27/23  8:59 AM

## 2023-01-27 NOTE — PROGRESS NOTES
Hospitalist Progress Note      PCP: Valentin Frank MD    Date of Admission: 1/25/2023      Subjective: seen and examined  Still delirious but better  No family at bedside      Medications:  Reviewed    Infusion Medications    phenylephrine (NARENDRA-SYNEPHRINE) 50 mg/250 mL infusion Stopped (01/26/23 1526)    heparin (PORCINE) Infusion 17 Units/kg/hr (01/27/23 1106)    sodium chloride      norepinephrine Stopped (01/26/23 1216)     Scheduled Medications    doxepin  150 mg Oral Nightly    cefTRIAXone (ROCEPHIN) IV  2,000 mg IntraVENous Q24H    Venelex   Topical BID    lactulose  10 g Oral TID    collagenase   Topical Daily    silver nitrate applicators   Topical Once    sodium chloride flush  5-40 mL IntraVENous 2 times per day    famotidine (PEPCID) injection  20 mg IntraVENous Daily     PRN Meds: prochlorperazine **OR** prochlorperazine, heparin (porcine), heparin (porcine), clonazePAM, perflutren lipid microspheres, sodium chloride flush, sodium chloride, polyethylene glycol, acetaminophen **OR** acetaminophen      Intake/Output Summary (Last 24 hours) at 1/27/2023 1654  Last data filed at 1/27/2023 0906  Gross per 24 hour   Intake 913.06 ml   Output 845 ml   Net 68.06 ml       Physical Exam Performed:    /67   Pulse (!) 113   Temp 97.8 °F (36.6 °C) (Bladder)   Resp 17   Ht 6' (1.829 m)   Wt 176 lb 5.9 oz (80 kg)   SpO2 97%   BMI 23.92 kg/m²     General appearance: No apparent distress, appears stated age and confused  HEENT: Pupils equal, round, and reactive to light. Conjunctivae/corneas clear. Neck: Supple, with full range of motion. No jugular venous distention. Trachea midline. Respiratory: b/l crackles   Cardiovascular: Regular rate and rhythm with normal S1/S2 without murmurs, rubs or gallops. Abdomen: Soft, non-tender, non-distended with normal bowel sounds. Musculoskeletal:b/l leg edema  Skin: Skin color, texture, turgor normal.  No rashes or lesions.   Neurologic:  not oriented  Psychiatric: Alert and oriented, thought content appropriate, normal insight  Capillary Refill: Brisk, 3 seconds, normal   Peripheral Pulses: +2 palpable, equal bilaterally       Labs:   Recent Labs     01/25/23  1258 01/26/23  0358 01/27/23  0538   WBC 18.0* 24.3* 16.5*   HGB 8.8* 8.8* 7.0*   HCT 27.6* 27.3* 21.8*    398 272     Recent Labs     01/25/23  1258 01/26/23  0358 01/27/23  0538    140 140   K 3.7 3.8 3.5    107 110   CO2 21 22 21   BUN 32* 29* 26*   CREATININE 1.8* 1.7* 1.2   CALCIUM 7.0* 7.0* 6.9*     Recent Labs     01/25/23  1258 01/26/23  0358 01/27/23  0538   AST 84* 69* 51*   ALT 63* 60* 50*   BILIDIR 1.0* 0.9* 0.6*   BILITOT 1.5* 1.3* 1.1*   ALKPHOS 275* 322* 287*     Recent Labs     01/25/23  1258 01/26/23  0358 01/27/23  0745   INR 2.03* 1.96* 1.82*     Recent Labs     01/26/23  2333 01/27/23  0327 01/27/23  0538   TROPONINI 0.18* 0.18* 0.17*       Urinalysis:      Lab Results   Component Value Date/Time    NITRU Negative 01/25/2023 01:16 PM    WBCUA >100 01/25/2023 01:16 PM    BACTERIA 3+ 01/25/2023 01:16 PM    RBCUA 0-2 01/25/2023 01:16 PM    BLOODU MODERATE 01/25/2023 01:16 PM    SPECGRAV 1.025 01/25/2023 01:16 PM    GLUCOSEU Negative 01/25/2023 01:16 PM       Radiology:  US DUP ABD PEL RETRO SCROT COMPLETE   Final Result      1. Patent portal veins, hepatic artery and hepatic veins. XR FOOT LEFT (MIN 3 VIEWS)   Final Result      1. No definite evidence of active osteomyelitis. XR FOOT RIGHT (MIN 3 VIEWS)   Final Result      1. No definite evidence of active osteomyelitis. XR CHEST PORTABLE   Final Result         1. Good position of left IJ central venous catheter in the lower SVC   2. No pneumothorax   3. Bilateral pleural effusions and left greater than right lung airspace disease             POC US ECHOCARDIO TRANSTHORACIC LTD   Final Result      CT Head W/O Contrast   Final Result      No evidence of acute intracranial abnormality. XR CHEST PORTABLE   Final Result   1. Abnormal airspace disease throughout the left hemithorax. 2. Follow-up is recommended with specific attention to obtaining a upright PA and lateral chest          IP CONSULT TO CRITICAL CARE  IP CONSULT TO HOSPITALIST  IP CONSULT TO PHARMACY  IP CONSULT TO PALLIATIVE CARE  IP CONSULT TO GENERAL SURGERY  IP CONSULT TO PODIATRY    Assessment/Plan:    Active Hospital Problems    Diagnosis     Delirium [R41.0]      Priority: Medium    Septic shock ([address] [A41.9, R65.21]      Priority: Medium    Sepsis ([address] [A41.9]      Priority: Medium     Acute metabolic encephalopathy  Severe sepsis  Complicated UTI likely sec to urinary retention, recent o/p catheterization 1/4/2023  Prob acute on chronic urinary retention  ALISON on CKD stage 3  Recent Bilateral PE 12/2022 hx on eliquis  Acute on chronic anemia  Chronic pain, narcotic dependent  Pericardial effusion: moderate  Chronic anxiety, benzo dependent  Elevated liver enzymes, mixed pattern   Morbid obesity s/p gastric bypass  Sacral decubitus ulcer stage IV       His encephalopathy is likely multifactorial from sepsis and possible hepatic encephalopathy     Pxt has obvious features of sepsis, with source likely sec to UTI. He also has elevated liver enzymes as well as elevated NH3 level. Unclear if he has a hx of chronic liver disease     CT A/P from 12/2022 shows \"normal liver\" but overall picture suggests chronic liver disease, possible cirrhosis     Reviewed records re recent ED and office visits at [facility] from late last year and early this year. Reviewed echo from 12/2022  Repeat echo:  Moderate circumferential pericardial effusion, no tamponade     Reviewed CT chest, CT A/P from 12/2022      Blood and urine culture     Broad spectrum antibx: Alvarado and freddy     Place Joseph     US RUQ to eval hepatobiliary system, suspect possible cirrhosis     Check Vit B12, TSH     Check Hepatitis panel and HIV screen     Continue AC if no C/I. Monitor hgb. If cannot take orally, and no bleeding, we will place on hep gtt. Palliative care consult    DVT Prophylaxis: eliquis  Diet: ADULT DIET; Full Liquid  ADULT ORAL NUTRITION SUPPLEMENT; Breakfast, Dinner;  Wound Healing Oral Supplement  Code Status: Full Code  PT/OT Eval Status: n/a     Dispo - inpatient       Magno Hall MD

## 2023-01-27 NOTE — PROGRESS NOTES
Patient has expressed how severe his anxiety is. Residents ordered patients home medications for anxiety to be given.

## 2023-01-27 NOTE — PROGRESS NOTES
Comprehensive Nutrition Assessment    RECOMMENDATIONS:  PO Diet: Continue FLD  ONS: Continue Wagner BID and assess need for additional ONS if intakes decrease  Nutrition Education: Education not indicated   Monitor nutrition adequacy, pertinent labs, bowel habits, wt changes, and clinical progress    NUTRITION ASSESSMENT:   Nutritional summary & status: Positive screen for wounds: Pt on FLD, 1 meal received consumed 50%. No N/V. Pt w/ stage IV PI to sacrum, Wagner ordered BID to aide in wound healing. Will continue to assess intakes, if PO falls <50% will add ONS BID along w/ Wagner. Will leave off for now as pt reports tolerating diet. No significant wt loss noted in EMR. Hx of DM, BG well controlled currently. Once diet is advanced will assess need for carb control restriction. Pt previously on pressors on admission, no longer requiring. Will continue to monitor. Admission/PMH: Sepsis, CKD stage III, HTN, T2DM, pancreatitis, gastric  bypass, AMS    MALNUTRITION ASSESSMENT  Context of Malnutrition: Acute Illness   Malnutrition Status:  At risk for malnutrition (Comment)  Findings of the 6 clinical characteristics of malnutrition (Minimum of 2 out of 6 clinical characteristics is required to make the diagnosis of moderate or severe Protein Calorie Malnutrition based on AND/ASPEN Guidelines):  Energy Intake:  Mild decrease in energy intake (Comment)  Weight Loss:  No significant weight loss     Body Fat Loss:  No significant body fat loss     Muscle Mass Loss:  No significant muscle mass loss    Fluid Accumulation:  Moderate to Severe      NUTRITION DIAGNOSIS   Inadequate protein intake related to increase demand for energy/nutrients as evidenced by wounds (Stage IV PI)    Nutrition Monitoring and Evaluation:   Food/Nutrient Intake Outcomes:  Food and Nutrient Intake, Supplement Intake  Physical Signs/Symptoms Outcomes:  Biochemical Data, Nutrition Focused Physical Findings, Weight, Skin, Constipation     OBJECTIVE DATA: Significant to nutrition assessment  Nutrition Related Findings: . Active bowel sounds. No BM. +2.6L. +3 pitting anasarca. Wounds: Stage IV, Pressure Injury (Sacrum)  Nutrition Goals: Meet at least 75% of estimated needs, prior to discharge     [address]; Full Liquid  ADULT ORAL NUTRITION SUPPLEMENT; Breakfast, Dinner; Wound Healing Oral Supplement  PO Intake: 26-50%   PO Supplement Intake:Unable to assess  Additional Sources of Calories/IVF:N/A     ANTHROPOMETRICS  Current Height: 6' (182.9 cm)  Current Weight: 176 lb 5.9 oz (80 kg)    Admission weight: 175 lb 11.3 oz (79.7 kg)  Ideal Body Weight (IBW): 178 lbs  (81 kg)    BMI: 24    COMPARATIVE STANDARDS  Energy (kcal):  0209-5568 (25-30 kcal/kg CBW)     Protein (g):  120-144 (1.5-1.8 g/kg CBW)       Fluid (mL/day):  1 mL/kcal or per MD    The patient will be monitored per nutrition standards of care. Consult dietitian if additional nutrition interventions are needed prior to RD reassessment.      Bandar Pruett, [address], [address] [place] [address] Drive:  [phone]  Office:  [phone]

## 2023-01-28 LAB
ALBUMIN SERPL-MCNC: 1.5 G/DL (ref 3.4–5)
ALP BLD-CCNC: 248 U/L (ref 40–129)
ALT SERPL-CCNC: 46 U/L (ref 10–40)
ANION GAP SERPL CALCULATED.3IONS-SCNC: 7 MMOL/L (ref 3–16)
APTT: 119.5 SEC (ref 23–34.3)
APTT: 59.9 SEC (ref 23–34.3)
APTT: 65.5 SEC (ref 23–34.3)
APTT: >180 SEC (ref 23–34.3)
AST SERPL-CCNC: 33 U/L (ref 15–37)
BASOPHILS ABSOLUTE: 0 K/UL (ref 0–0.2)
BASOPHILS RELATIVE PERCENT: 0 %
BILIRUB SERPL-MCNC: 0.9 MG/DL (ref 0–1)
BILIRUBIN DIRECT: 0.5 MG/DL (ref 0–0.3)
BILIRUBIN, INDIRECT: 0.4 MG/DL (ref 0–1)
BLOOD CULTURE, ROUTINE: ABNORMAL
BUN BLDV-MCNC: 19 MG/DL (ref 7–20)
CALCIUM SERPL-MCNC: 6.9 MG/DL (ref 8.3–10.6)
CHLORIDE BLD-SCNC: 113 MMOL/L (ref 99–110)
CO2: 25 MMOL/L (ref 21–32)
CREAT SERPL-MCNC: 0.8 MG/DL (ref 0.8–1.3)
EOSINOPHILS ABSOLUTE: 0 K/UL (ref 0–0.6)
EOSINOPHILS RELATIVE PERCENT: 0.1 %
GFR SERPL CREATININE-BSD FRML MDRD: >60 ML/MIN/{1.73_M2}
GLUCOSE BLD-MCNC: 95 MG/DL (ref 70–99)
HCT VFR BLD CALC: 22.9 % (ref 40.5–52.5)
HEMOGLOBIN: 7.3 G/DL (ref 13.5–17.5)
INR BLD: 1.71 (ref 0.87–1.14)
LYMPHOCYTES ABSOLUTE: 2.2 K/UL (ref 1–5.1)
LYMPHOCYTES RELATIVE PERCENT: 12.4 %
MAGNESIUM: 1.9 MG/DL (ref 1.8–2.4)
MCH RBC QN AUTO: 33.3 PG (ref 26–34)
MCHC RBC AUTO-ENTMCNC: 31.9 G/DL (ref 31–36)
MCV RBC AUTO: 104.6 FL (ref 80–100)
MONOCYTES ABSOLUTE: 1.1 K/UL (ref 0–1.3)
MONOCYTES RELATIVE PERCENT: 6.3 %
NEUTROPHILS ABSOLUTE: 14.3 K/UL (ref 1.7–7.7)
NEUTROPHILS RELATIVE PERCENT: 81.2 %
ORGANISM: ABNORMAL
PDW BLD-RTO: 15.1 % (ref 12.4–15.4)
PLATELET # BLD: 261 K/UL (ref 135–450)
PMV BLD AUTO: 7.7 FL (ref 5–10.5)
POTASSIUM REFLEX MAGNESIUM: 3.9 MMOL/L (ref 3.5–5.1)
PROTHROMBIN TIME: 20.1 SEC (ref 11.7–14.5)
RBC # BLD: 2.19 M/UL (ref 4.2–5.9)
REPORT: NORMAL
SODIUM BLD-SCNC: 145 MMOL/L (ref 136–145)
TOTAL PROTEIN: 3.9 G/DL (ref 6.4–8.2)
WBC # BLD: 17.7 K/UL (ref 4–11)

## 2023-01-28 PROCEDURE — 6370000000 HC RX 637 (ALT 250 FOR IP)

## 2023-01-28 PROCEDURE — 2580000003 HC RX 258: Performed by: STUDENT IN AN ORGANIZED HEALTH CARE EDUCATION/TRAINING PROGRAM

## 2023-01-28 PROCEDURE — 85025 COMPLETE CBC W/AUTO DIFF WBC: CPT

## 2023-01-28 PROCEDURE — 80076 HEPATIC FUNCTION PANEL: CPT

## 2023-01-28 PROCEDURE — A4216 STERILE WATER/SALINE, 10 ML: HCPCS

## 2023-01-28 PROCEDURE — 2060000000 HC ICU INTERMEDIATE R&B

## 2023-01-28 PROCEDURE — 2580000003 HC RX 258

## 2023-01-28 PROCEDURE — 99231 SBSQ HOSP IP/OBS SF/LOW 25: CPT | Performed by: SURGERY

## 2023-01-28 PROCEDURE — 36415 COLL VENOUS BLD VENIPUNCTURE: CPT

## 2023-01-28 PROCEDURE — 85610 PROTHROMBIN TIME: CPT

## 2023-01-28 PROCEDURE — 6370000000 HC RX 637 (ALT 250 FOR IP): Performed by: INTERNAL MEDICINE

## 2023-01-28 PROCEDURE — 2500000003 HC RX 250 WO HCPCS

## 2023-01-28 PROCEDURE — 6360000002 HC RX W HCPCS: Performed by: STUDENT IN AN ORGANIZED HEALTH CARE EDUCATION/TRAINING PROGRAM

## 2023-01-28 PROCEDURE — 6360000002 HC RX W HCPCS: Performed by: INTERNAL MEDICINE

## 2023-01-28 PROCEDURE — 6370000000 HC RX 637 (ALT 250 FOR IP): Performed by: STUDENT IN AN ORGANIZED HEALTH CARE EDUCATION/TRAINING PROGRAM

## 2023-01-28 PROCEDURE — 85730 THROMBOPLASTIN TIME PARTIAL: CPT

## 2023-01-28 PROCEDURE — 83735 ASSAY OF MAGNESIUM: CPT

## 2023-01-28 PROCEDURE — 80048 BASIC METABOLIC PNL TOTAL CA: CPT

## 2023-01-28 RX ADMIN — HEPARIN SODIUM 20 UNITS/KG/HR: 10000 INJECTION, SOLUTION INTRAVENOUS at 12:51

## 2023-01-28 RX ADMIN — COLLAGENASE SANTYL: 250 OINTMENT TOPICAL at 08:28

## 2023-01-28 RX ADMIN — CLONAZEPAM 1 MG: 1 TABLET ORAL at 11:00

## 2023-01-28 RX ADMIN — HEPARIN SODIUM 3200 UNITS: 1000 INJECTION INTRAVENOUS; SUBCUTANEOUS at 08:35

## 2023-01-28 RX ADMIN — DOXEPIN HYDROCHLORIDE 150 MG: 25 CAPSULE ORAL at 20:27

## 2023-01-28 RX ADMIN — Medication: at 08:30

## 2023-01-28 RX ADMIN — HEPARIN SODIUM 18 UNITS/KG/HR: 10000 INJECTION, SOLUTION INTRAVENOUS at 01:09

## 2023-01-28 RX ADMIN — LACTULOSE 10 G: 10 SOLUTION ORAL at 12:59

## 2023-01-28 RX ADMIN — SODIUM CHLORIDE, PRESERVATIVE FREE 10 ML: 5 INJECTION INTRAVENOUS at 08:30

## 2023-01-28 RX ADMIN — CEFTRIAXONE 2000 MG: 2 INJECTION, POWDER, FOR SOLUTION INTRAMUSCULAR; INTRAVENOUS at 10:48

## 2023-01-28 RX ADMIN — SODIUM CHLORIDE, PRESERVATIVE FREE 20 MG: 5 INJECTION INTRAVENOUS at 08:27

## 2023-01-28 RX ADMIN — Medication: at 20:21

## 2023-01-28 RX ADMIN — SODIUM CHLORIDE, PRESERVATIVE FREE 10 ML: 5 INJECTION INTRAVENOUS at 20:26

## 2023-01-28 RX ADMIN — Medication 3 MG: at 20:27

## 2023-01-28 RX ADMIN — THIAMINE HYDROCHLORIDE 100 MG: 100 INJECTION, SOLUTION INTRAMUSCULAR; INTRAVENOUS at 08:27

## 2023-01-28 RX ADMIN — LACTULOSE 10 G: 10 SOLUTION ORAL at 08:27

## 2023-01-28 ASSESSMENT — PAIN SCALES - GENERAL
PAINLEVEL_OUTOF10: 0

## 2023-01-28 NOTE — PROGRESS NOTES
General Surgery   Daily Progress Note  Patient: Gera Blanco      CC: stage 4 sacral decubitus ulcer    SUBJECTIVE:   Patient rested well overnight. Denies pain on his sacrum. Tolerating diet without nausea or vomiting. Voiding appropriately without issues. Passing gas and having bowel movements. ROS:   A 14 point review of systems was conducted, significant findings as noted above. All other systems negative. OBJECTIVE:    PHYSICAL EXAM:    Vitals:    01/28/23 0900 01/28/23 1000 01/28/23 1100 01/28/23 1200   BP: 120/77 116/74 124/82 113/73   Pulse: (!) 103 (!) 106 (!) 112 (!) 117   Resp: 14 19 15 17   Temp:    97.5 °F (36.4 °C)   TempSrc:    Bladder   SpO2: 96% 95%     Weight:       Height:         General appearance: Alert, no acute distress, grooming appropriate  Eyes: No scleral icterus, EOM grossly intact  Neck: Trachea midline, no JVD, no lymphadenopathy, neck supple  Chest/Lungs: Normal effort with no accessory muscle use on RA  Cardiovascular: RRR, well-perfused  Abdomen: Soft, non-tender, non-distended, no rebound, guarding, or rigidity. Skin: Warm and dry, no rashes  Extremities: No edema, no cyanosis  Genitourinary: Stage 4 decubitus ulcer  Neuro: A&Ox3, no focal deficits, sensation intact  Sacral wound: stage 4 ulcer (7 cm x 4.5 cm x 2 cm); hemostatic, no purulence or odor; some new yellow slough at wound base          LABS:   Recent Labs     01/27/23  0538 01/28/23  0640   WBC 16.5* 17.7*   HGB 7.0* 7.3*   HCT 21.8* 22.9*   .5* 104.6*    261          Recent Labs     01/27/23  0538 01/28/23  0640    145   K 3.5 3.9    113*   CO2 21 25   BUN 26* 19   CREATININE 1.2 0.8          Recent Labs     01/27/23  0538 01/28/23  0640   AST 51* 33   ALT 50* 46*   BILIDIR 0.6* 0.5*   BILITOT 1.1* 0.9   ALKPHOS 287* 248*          No results for input(s): LIPASE, AMYLASE in the last 72 hours.        Recent Labs     01/27/23  0538 01/27/23  0745 01/27/23  1801 01/28/23  0353 01/28/23  1304   PROT 3.7*  --   --  3.9*  --    INR  --  1.82*  --  1.71*  --    APTT 63.5*  --    < > 65.5* >180.0*    < > = values in this interval not displayed. Recent Labs     01/27/23  0327 01/27/23  0538   TROPONINI 0.18* 0.17*         ASSESSMENT & PLAN:   This is a 58 y.o. male with Hx of chronic back pain, pulmonary embolism (12/2022) on Eliquis, CKD, DM, HTN, HLD, TIA, stroke, pancreatitis, gastric bypass who presented with altered mental status from home. General surgery is following for a known stage 4 sacral ulcer now s/p bedside debridement (01/26).     - Continue W-to-D Kerlix to wound and cover with sacral heart BID  - Possible bedside debridement in the near future  - Continue Hep gtt  - Will continue to follow      Vita Samayoa DO, [place]  PGY1, General Surgery  01/28/23  3:42 PM  [employer]  Pager: [phone]

## 2023-01-28 NOTE — PROGRESS NOTES
Patient is becoming increasingly anxious and confused. Patient is refusing to take his PO medications and is threatening to remove his nichols catheter. This nurse reached out to the hospitalist and the hospitalist ordered medications to help patient calm down. Will attempt to get patient to take medication.

## 2023-01-28 NOTE — PLAN OF CARE
Problem: Discharge Planning  Goal: Discharge to home or other facility with appropriate resources  1/28/2023 0959 by Joseph Everett RN  Outcome: Progressing  1/27/2023 2326 by Jet Page RN  Outcome: Progressing     Problem: Pain  Goal: Verbalizes/displays adequate comfort level or baseline comfort level  1/28/2023 0959 by Joseph Everett RN  Outcome: Progressing  1/27/2023 2326 by Jet Page RN  Outcome: Progressing  Flowsheets (Taken 1/27/2023 2000)  Verbalizes/displays adequate comfort level or baseline comfort level: Assess pain using appropriate pain scale     Problem: Safety - Adult  Goal: Free from fall injury  1/28/2023 0959 by Joseph Everett RN  Outcome: Progressing  1/27/2023 2326 by Jet Page RN  Outcome: Progressing     Problem: Skin/Tissue Integrity  Goal: Absence of new skin breakdown  Description: 1. Monitor for areas of redness and/or skin breakdown  2. Assess vascular access sites hourly  3. Every 4-6 hours minimum:  Change oxygen saturation probe site  4. Every 4-6 hours:  If on nasal continuous positive airway pressure, respiratory therapy assess nares and determine need for appliance change or resting period.   1/28/2023 0959 by Joseph Everett RN  Outcome: Progressing  1/27/2023 2326 by Jet Page RN  Outcome: Progressing     Problem: Chronic Conditions and Co-morbidities  Goal: Patient's chronic conditions and co-morbidity symptoms are monitored and maintained or improved  1/28/2023 0959 by Joseph Everett RN  Outcome: Progressing  1/27/2023 2326 by Jet Page RN  Outcome: Progressing     Problem: ABCDS Injury Assessment  Goal: Absence of physical injury  1/28/2023 0959 by Joseph Everett RN  Outcome: Progressing  1/27/2023 2326 by Jet Page RN  Outcome: Progressing     Problem: Nutrition Deficit:  Goal: Optimize nutritional status  1/28/2023 0959 by Joseph Everett RN  Outcome: Progressing  1/27/2023 2326 by Jet Page RN  Outcome: Progressing

## 2023-01-28 NOTE — PROGRESS NOTES
Patient talked on the phone with their partner and it helped ease his anxiety. Patient took all of his PO medications and is now resting in bed.

## 2023-01-28 NOTE — PROGRESS NOTES
Hospitalist Progress Note      PCP: Cece Molina MD    Date of Admission: 1/25/2023      Subjective: seen and examined  Ammonia still elevated, patient is more awake   at bedside        Medications:  Reviewed    Infusion Medications    phenylephrine (NARENDRA-SYNEPHRINE) 50 mg/250 mL infusion Stopped (01/26/23 1526)    heparin (PORCINE) Infusion 17 Units/kg/hr (01/28/23 1503)    sodium chloride      norepinephrine Stopped (01/26/23 1216)     Scheduled Medications    doxepin  150 mg Oral Nightly    cefTRIAXone (ROCEPHIN) IV  2,000 mg IntraVENous Q24H    thiamine  100 mg IntraVENous Daily    melatonin  3 mg Oral Nightly    Venelex   Topical BID    lactulose  10 g Oral TID    collagenase   Topical Daily    silver nitrate applicators   Topical Once    sodium chloride flush  5-40 mL IntraVENous 2 times per day    famotidine (PEPCID) injection  20 mg IntraVENous Daily     PRN Meds: prochlorperazine **OR** prochlorperazine, heparin (porcine), heparin (porcine), clonazePAM, perflutren lipid microspheres, sodium chloride flush, sodium chloride, polyethylene glycol, acetaminophen **OR** acetaminophen      Intake/Output Summary (Last 24 hours) at 1/28/2023 1652  Last data filed at 1/28/2023 9133  Gross per 24 hour   Intake 2330.58 ml   Output 925 ml   Net 1405.58 ml       Physical Exam Performed:    /73   Pulse (!) 117   Temp 97.5 °F (36.4 °C) (Bladder)   Resp 17   Ht 6' (1.829 m)   Wt 190 lb 11.2 oz (86.5 kg)   SpO2 95%   BMI 25.86 kg/m²     General appearance: No apparent distress, appears stated age and confused  HEENT: Pupils equal, round, and reactive to light. Conjunctivae/corneas clear. Neck: Supple, with full range of motion. No jugular venous distention. Trachea midline. Respiratory: b/l crackles   Cardiovascular: Regular rate and rhythm with normal S1/S2 without murmurs, rubs or gallops. Abdomen: Soft, non-tender, non-distended with normal bowel sounds.   Musculoskeletal:b/l leg edema  Skin: Skin color, texture, turgor normal.  No rashes or lesions. Neurologic:  not oriented  Psychiatric: Alert and oriented, thought content appropriate, normal insight  Capillary Refill: Brisk, 3 seconds, normal   Peripheral Pulses: +2 palpable, equal bilaterally       Labs:   Recent Labs     01/26/23  0358 01/27/23  0538 01/28/23  0640   WBC 24.3* 16.5* 17.7*   HGB 8.8* 7.0* 7.3*   HCT 27.3* 21.8* 22.9*    272 261     Recent Labs     01/26/23  0358 01/27/23  0538 01/28/23  0640    140 145   K 3.8 3.5 3.9    110 113*   CO2 22 21 25   BUN 29* 26* 19   CREATININE 1.7* 1.2 0.8   CALCIUM 7.0* 6.9* 6.9*     Recent Labs     01/26/23  0358 01/27/23  0538 01/28/23  0640   AST 69* 51* 33   ALT 60* 50* 46*   BILIDIR 0.9* 0.6* 0.5*   BILITOT 1.3* 1.1* 0.9   ALKPHOS 322* 287* 248*     Recent Labs     01/26/23  0358 01/27/23  0745 01/28/23  0640   INR 1.96* 1.82* 1.71*     Recent Labs     01/26/23  2333 01/27/23  0327 01/27/23  0538   TROPONINI 0.18* 0.18* 0.17*       Urinalysis:      Lab Results   Component Value Date/Time    NITRU Negative 01/25/2023 01:16 PM    WBCUA >100 01/25/2023 01:16 PM    BACTERIA 3+ 01/25/2023 01:16 PM    RBCUA 0-2 01/25/2023 01:16 PM    BLOODU MODERATE 01/25/2023 01:16 PM    SPECGRAV 1.025 01/25/2023 01:16 PM    GLUCOSEU Negative 01/25/2023 01:16 PM       Radiology:  US DUP ABD PEL RETRO SCROT COMPLETE   Final Result      1. Patent portal veins, hepatic artery and hepatic veins. XR FOOT LEFT (MIN 3 VIEWS)   Final Result      1. No definite evidence of active osteomyelitis. XR FOOT RIGHT (MIN 3 VIEWS)   Final Result      1. No definite evidence of active osteomyelitis. XR CHEST PORTABLE   Final Result         1. Good position of left IJ central venous catheter in the lower SVC   2. No pneumothorax   3.  Bilateral pleural effusions and left greater than right lung airspace disease             POC US ECHOCARDIO [employer]STHOR[employer] LTD   Final Result      CT Head W/O Contrast Final Result      No evidence of acute intracranial abnormality. XR CHEST PORTABLE   Final Result   1. Abnormal airspace disease throughout the left hemithorax. 2. Follow-up is recommended with specific attention to obtaining a upright PA and lateral chest          IP CONSULT TO CRITICAL CARE  IP CONSULT TO HOSPITALIST  IP CONSULT TO PHARMACY  IP CONSULT TO PALLIATIVE CARE  IP CONSULT TO GENERAL SURGERY  IP CONSULT TO PODIATRY  IP CONSULT TO GI    Assessment/Plan:    Active Hospital Problems    Diagnosis     Delirium [R41.0]      Priority: Medium    Septic shock ([address] [A41.9, R65.21]      Priority: Medium    Sepsis ([address] [A41.9]      Priority: Medium     Acute metabolic encephalopathy  Severe sepsis  Complicated UTI likely sec to urinary retention, recent o/p catheterization 1/4/2023  Prob acute on chronic urinary retention  ALISON on CKD stage 3  Recent Bilateral PE 12/2022 hx on eliquis  Acute on chronic anemia  Chronic pain, narcotic dependent  Pericardial effusion: moderate  Chronic anxiety, benzo dependent  Elevated liver enzymes, mixed pattern   Morbid obesity s/p gastric bypass  Sacral decubitus ulcer stage IV       His encephalopathy is likely multifactorial from sepsis and possible hepatic encephalopathy     Pxt has obvious features of sepsis, with source likely sec to UTI. He also has elevated liver enzymes as well as elevated NH3 level. Unclear if he has a hx of chronic liver disease, consult GI     CT A/P from 12/2022 shows \"normal liver\" but overall picture suggests chronic liver disease, possible cirrhosis     Reviewed echo from 12/2022  Repeat echo:  Moderate circumferential pericardial effusion, no tamponade     Reviewed CT chest, CT A/P from 12/2022      Blood and urine culture     Broad spectrum antibx: Alvarado and merem     Place Joseph     US RUQ to eval hepatobiliary system, suspect possible cirrhosis     Check Vit B12, TSH     Check Hepatitis panel and HIV screen Continue AC if no C/I. Monitor hgb. If cannot take orally, and no bleeding, we will place on hep gtt. Palliative care consult    DVT Prophylaxis: eliquis  Diet: ADULT ORAL NUTRITION SUPPLEMENT; Breakfast, Dinner; Wound Healing Oral Supplement  ADULT DIET;  Regular  Code Status: Full Code  PT/OT Eval Status: n/a     Dispo - inpatient , pending improvement      Yosef Frausto MD

## 2023-01-28 NOTE — PLAN OF CARE
Problem: Discharge Planning  Goal: Discharge to home or other facility with appropriate resources  1/27/2023 2326 by Isai Burr RN  Outcome: Progressing  1/27/2023 1429 by Supriya Loera RN  Outcome: Progressing  Flowsheets (Taken 1/27/2023 0800)  Discharge to home or other facility with appropriate resources: Identify barriers to discharge with patient and caregiver     Problem: Pain  Goal: Verbalizes/displays adequate comfort level or baseline comfort level  1/27/2023 2326 by Isai Burr RN  Outcome: Progressing  Flowsheets (Taken 1/27/2023 2000)  Verbalizes/displays adequate comfort level or baseline comfort level: Assess pain using appropriate pain scale  1/27/2023 1429 by Supriya Loera RN  Outcome: Progressing  Flowsheets (Taken 1/27/2023 0400 by Isai Burr RN)  Verbalizes/displays adequate comfort level or baseline comfort level: Assess pain using appropriate pain scale     Problem: Safety - Adult  Goal: Free from fall injury  1/27/2023 2326 by Isai Burr RN  Outcome: Progressing  1/27/2023 1429 by Supriya Loera RN  Outcome: Progressing     Problem: Skin/Tissue Integrity  Goal: Absence of new skin breakdown  Description: 1. Monitor for areas of redness and/or skin breakdown  2. Assess vascular access sites hourly  3. Every 4-6 hours minimum:  Change oxygen saturation probe site  4. Every 4-6 hours:  If on nasal continuous positive airway pressure, respiratory therapy assess nares and determine need for appliance change or resting period.   1/27/2023 2326 by Isai Burr RN  Outcome: Progressing  1/27/2023 1429 by Supriya Loera RN  Outcome: Progressing     Problem: Chronic Conditions and Co-morbidities  Goal: Patient's chronic conditions and co-morbidity symptoms are monitored and maintained or improved  1/27/2023 2326 by Isai Burr RN  Outcome: Progressing  1/27/2023 1429 by Supriya Loera RN  Outcome: Progressing     Problem: ABCDS Injury Assessment  Goal: Absence of physical injury  1/27/2023 2326 by Lalo Bird RN  Outcome: Progressing  1/27/2023 1429 by Liya Ca RN  Outcome: Progressing     Problem: Nutrition Deficit:  Goal: Optimize nutritional status  1/27/2023 2326 by Lalo Bird RN  Outcome: Progressing  1/27/2023 1429 by Liya Ca RN  Outcome: Progressing

## 2023-01-29 PROBLEM — L89.154 DECUBITUS ULCER OF SACRAL REGION, STAGE 4 (HCC): Status: ACTIVE | Noted: 2023-01-29

## 2023-01-29 LAB
ALBUMIN SERPL-MCNC: 1.6 G/DL (ref 3.4–5)
ALP BLD-CCNC: 241 U/L (ref 40–129)
ALT SERPL-CCNC: 40 U/L (ref 10–40)
ANION GAP SERPL CALCULATED.3IONS-SCNC: 6 MMOL/L (ref 3–16)
APTT: 124.2 SEC (ref 23–34.3)
AST SERPL-CCNC: 29 U/L (ref 15–37)
BASOPHILS ABSOLUTE: 0 K/UL (ref 0–0.2)
BASOPHILS RELATIVE PERCENT: 0 %
BILIRUB SERPL-MCNC: 0.7 MG/DL (ref 0–1)
BILIRUBIN DIRECT: 0.4 MG/DL (ref 0–0.3)
BILIRUBIN, INDIRECT: 0.3 MG/DL (ref 0–1)
BUN BLDV-MCNC: 18 MG/DL (ref 7–20)
CALCIUM IONIZED: 1.09 MMOL/L (ref 1.12–1.32)
CALCIUM SERPL-MCNC: 7.2 MG/DL (ref 8.3–10.6)
CHLORIDE BLD-SCNC: 112 MMOL/L (ref 99–110)
CO2: 26 MMOL/L (ref 21–32)
CREAT SERPL-MCNC: 0.7 MG/DL (ref 0.8–1.3)
CULTURE, BLOOD 2: NORMAL
EOSINOPHILS ABSOLUTE: 0 K/UL (ref 0–0.6)
EOSINOPHILS RELATIVE PERCENT: 0.1 %
GFR SERPL CREATININE-BSD FRML MDRD: >60 ML/MIN/{1.73_M2}
GLUCOSE BLD-MCNC: 88 MG/DL (ref 70–99)
HCT VFR BLD CALC: 21.4 % (ref 40.5–52.5)
HEMOGLOBIN: 7 G/DL (ref 13.5–17.5)
LYMPHOCYTES ABSOLUTE: 1.7 K/UL (ref 1–5.1)
LYMPHOCYTES RELATIVE PERCENT: 15.8 %
MAGNESIUM: 1.9 MG/DL (ref 1.8–2.4)
MCH RBC QN AUTO: 34.1 PG (ref 26–34)
MCHC RBC AUTO-ENTMCNC: 32.7 G/DL (ref 31–36)
MCV RBC AUTO: 104.1 FL (ref 80–100)
MONOCYTES ABSOLUTE: 0.8 K/UL (ref 0–1.3)
MONOCYTES RELATIVE PERCENT: 7 %
NEUTROPHILS ABSOLUTE: 8.4 K/UL (ref 1.7–7.7)
NEUTROPHILS RELATIVE PERCENT: 77.1 %
PDW BLD-RTO: 15.7 % (ref 12.4–15.4)
PH VENOUS: 7.43 (ref 7.35–7.45)
PHOSPHORUS: 2.6 MG/DL (ref 2.5–4.9)
PLATELET # BLD: 238 K/UL (ref 135–450)
PMV BLD AUTO: 7.9 FL (ref 5–10.5)
POTASSIUM REFLEX MAGNESIUM: 3.7 MMOL/L (ref 3.5–5.1)
RBC # BLD: 2.05 M/UL (ref 4.2–5.9)
SODIUM BLD-SCNC: 144 MMOL/L (ref 136–145)
TOTAL PROTEIN: 4.1 G/DL (ref 6.4–8.2)
WBC # BLD: 10.8 K/UL (ref 4–11)

## 2023-01-29 PROCEDURE — 87205 SMEAR GRAM STAIN: CPT

## 2023-01-29 PROCEDURE — 82330 ASSAY OF CALCIUM: CPT

## 2023-01-29 PROCEDURE — 2500000003 HC RX 250 WO HCPCS: Performed by: STUDENT IN AN ORGANIZED HEALTH CARE EDUCATION/TRAINING PROGRAM

## 2023-01-29 PROCEDURE — 87070 CULTURE OTHR SPECIMN AEROBIC: CPT

## 2023-01-29 PROCEDURE — 87181 SC STD AGAR DILUTION PER AGT: CPT

## 2023-01-29 PROCEDURE — 6360000002 HC RX W HCPCS: Performed by: STUDENT IN AN ORGANIZED HEALTH CARE EDUCATION/TRAINING PROGRAM

## 2023-01-29 PROCEDURE — 85730 THROMBOPLASTIN TIME PARTIAL: CPT

## 2023-01-29 PROCEDURE — 6370000000 HC RX 637 (ALT 250 FOR IP)

## 2023-01-29 PROCEDURE — 83735 ASSAY OF MAGNESIUM: CPT

## 2023-01-29 PROCEDURE — 6360000002 HC RX W HCPCS

## 2023-01-29 PROCEDURE — 2580000003 HC RX 258: Performed by: STUDENT IN AN ORGANIZED HEALTH CARE EDUCATION/TRAINING PROGRAM

## 2023-01-29 PROCEDURE — 2060000000 HC ICU INTERMEDIATE R&B

## 2023-01-29 PROCEDURE — 87184 SC STD DISK METHOD PER PLATE: CPT

## 2023-01-29 PROCEDURE — 85025 COMPLETE CBC W/AUTO DIFF WBC: CPT

## 2023-01-29 PROCEDURE — 80048 BASIC METABOLIC PNL TOTAL CA: CPT

## 2023-01-29 PROCEDURE — 6360000002 HC RX W HCPCS: Performed by: INTERNAL MEDICINE

## 2023-01-29 PROCEDURE — 87077 CULTURE AEROBIC IDENTIFY: CPT

## 2023-01-29 PROCEDURE — P9047 ALBUMIN (HUMAN), 25%, 50ML: HCPCS | Performed by: INTERNAL MEDICINE

## 2023-01-29 PROCEDURE — 6370000000 HC RX 637 (ALT 250 FOR IP): Performed by: STUDENT IN AN ORGANIZED HEALTH CARE EDUCATION/TRAINING PROGRAM

## 2023-01-29 PROCEDURE — 84100 ASSAY OF PHOSPHORUS: CPT

## 2023-01-29 PROCEDURE — 2500000003 HC RX 250 WO HCPCS

## 2023-01-29 PROCEDURE — 87075 CULTR BACTERIA EXCEPT BLOOD: CPT

## 2023-01-29 PROCEDURE — 2580000003 HC RX 258

## 2023-01-29 PROCEDURE — 99231 SBSQ HOSP IP/OBS SF/LOW 25: CPT | Performed by: SURGERY

## 2023-01-29 PROCEDURE — A4216 STERILE WATER/SALINE, 10 ML: HCPCS

## 2023-01-29 PROCEDURE — 36415 COLL VENOUS BLD VENIPUNCTURE: CPT

## 2023-01-29 PROCEDURE — 6370000000 HC RX 637 (ALT 250 FOR IP): Performed by: INTERNAL MEDICINE

## 2023-01-29 PROCEDURE — 80076 HEPATIC FUNCTION PANEL: CPT

## 2023-01-29 PROCEDURE — 87186 SC STD MICRODIL/AGAR DIL: CPT

## 2023-01-29 RX ORDER — LORAZEPAM 2 MG/ML
0.5 INJECTION INTRAMUSCULAR EVERY 6 HOURS PRN
Status: DISCONTINUED | OUTPATIENT
Start: 2023-01-29 | End: 2023-01-29

## 2023-01-29 RX ORDER — FUROSEMIDE 10 MG/ML
20 INJECTION INTRAMUSCULAR; INTRAVENOUS DAILY
Status: DISCONTINUED | OUTPATIENT
Start: 2023-01-29 | End: 2023-02-01

## 2023-01-29 RX ORDER — ALBUMIN (HUMAN) 12.5 G/50ML
25 SOLUTION INTRAVENOUS EVERY 8 HOURS
Status: DISCONTINUED | OUTPATIENT
Start: 2023-01-29 | End: 2023-02-02

## 2023-01-29 RX ORDER — LIDOCAINE HYDROCHLORIDE AND EPINEPHRINE 10; 10 MG/ML; UG/ML
20 INJECTION, SOLUTION INFILTRATION; PERINEURAL ONCE
Status: COMPLETED | OUTPATIENT
Start: 2023-01-29 | End: 2023-01-29

## 2023-01-29 RX ORDER — CALCIUM GLUCONATE 10 MG/ML
1000 INJECTION, SOLUTION INTRAVENOUS ONCE
Status: COMPLETED | OUTPATIENT
Start: 2023-01-29 | End: 2023-01-29

## 2023-01-29 RX ORDER — HALOPERIDOL 5 MG/ML
2 INJECTION INTRAMUSCULAR ONCE
Status: COMPLETED | OUTPATIENT
Start: 2023-01-29 | End: 2023-01-29

## 2023-01-29 RX ORDER — MAGNESIUM SULFATE IN WATER 40 MG/ML
2000 INJECTION, SOLUTION INTRAVENOUS ONCE
Status: COMPLETED | OUTPATIENT
Start: 2023-01-29 | End: 2023-01-29

## 2023-01-29 RX ORDER — METOPROLOL SUCCINATE 25 MG/1
25 TABLET, EXTENDED RELEASE ORAL DAILY
Status: DISCONTINUED | OUTPATIENT
Start: 2023-01-29 | End: 2023-02-02

## 2023-01-29 RX ADMIN — SODIUM CHLORIDE, PRESERVATIVE FREE 20 MG: 5 INJECTION INTRAVENOUS at 09:09

## 2023-01-29 RX ADMIN — CALCIUM GLUCONATE 1000 MG: 10 INJECTION, SOLUTION INTRAVENOUS at 16:28

## 2023-01-29 RX ADMIN — HALOPERIDOL LACTATE 2 MG: 5 INJECTION, SOLUTION INTRAMUSCULAR at 20:39

## 2023-01-29 RX ADMIN — Medication: at 09:12

## 2023-01-29 RX ADMIN — FUROSEMIDE 20 MG: 10 INJECTION, SOLUTION INTRAMUSCULAR; INTRAVENOUS at 12:26

## 2023-01-29 RX ADMIN — ALBUMIN (HUMAN) 25 G: 0.25 INJECTION, SOLUTION INTRAVENOUS at 19:54

## 2023-01-29 RX ADMIN — LORAZEPAM 0.5 MG: 2 INJECTION INTRAMUSCULAR; INTRAVENOUS at 12:20

## 2023-01-29 RX ADMIN — SODIUM CHLORIDE, PRESERVATIVE FREE 20 ML: 5 INJECTION INTRAVENOUS at 09:42

## 2023-01-29 RX ADMIN — HYDROMORPHONE HYDROCHLORIDE 0.25 MG: 1 INJECTION, SOLUTION INTRAMUSCULAR; INTRAVENOUS; SUBCUTANEOUS at 15:11

## 2023-01-29 RX ADMIN — METOPROLOL SUCCINATE 25 MG: 25 TABLET, EXTENDED RELEASE ORAL at 12:24

## 2023-01-29 RX ADMIN — ALBUMIN (HUMAN) 25 G: 0.25 INJECTION, SOLUTION INTRAVENOUS at 12:18

## 2023-01-29 RX ADMIN — Medication: at 21:47

## 2023-01-29 RX ADMIN — SODIUM CHLORIDE, PRESERVATIVE FREE 10 ML: 5 INJECTION INTRAVENOUS at 19:59

## 2023-01-29 RX ADMIN — Medication 0.25 MG: at 15:11

## 2023-01-29 RX ADMIN — POTASSIUM CHLORIDE 30 MEQ: 1500 TABLET, EXTENDED RELEASE ORAL at 09:10

## 2023-01-29 RX ADMIN — CEFTRIAXONE 2000 MG: 2 INJECTION, POWDER, FOR SOLUTION INTRAMUSCULAR; INTRAVENOUS at 10:31

## 2023-01-29 RX ADMIN — COLLAGENASE SANTYL: 250 OINTMENT TOPICAL at 09:13

## 2023-01-29 RX ADMIN — DOXEPIN HYDROCHLORIDE 150 MG: 25 CAPSULE ORAL at 20:07

## 2023-01-29 RX ADMIN — LIDOCAINE HYDROCHLORIDE,EPINEPHRINE BITARTRATE 20 ML: 10; .01 INJECTION, SOLUTION INFILTRATION; PERINEURAL at 15:12

## 2023-01-29 RX ADMIN — MAGNESIUM SULFATE HEPTAHYDRATE 2000 MG: 40 INJECTION, SOLUTION INTRAVENOUS at 09:32

## 2023-01-29 RX ADMIN — THIAMINE HYDROCHLORIDE 100 MG: 100 INJECTION, SOLUTION INTRAMUSCULAR; INTRAVENOUS at 09:10

## 2023-01-29 ASSESSMENT — PAIN SCALES - GENERAL: PAINLEVEL_OUTOF10: 0

## 2023-01-29 NOTE — PROGRESS NOTES
Hospitalist Progress Note      PCP: Deuce Rodríguez MD    Date of Admission: 1/25/2023      Subjective: seen and examined  Ptient was agitated today as he wants to go home. He was given his home klonopin, better now  No family at bedside    Medications:  Reviewed    Infusion Medications    [Held by provider] heparin (PORCINE) Infusion Stopped (01/29/23 0910)    sodium chloride       Scheduled Medications    metoprolol succinate  25 mg Oral Daily    albumin human  25 g IntraVENous Q8H    furosemide  20 mg IntraVENous Daily    doxepin  150 mg Oral Nightly    cefTRIAXone (ROCEPHIN) IV  2,000 mg IntraVENous Q24H    thiamine  100 mg IntraVENous Daily    melatonin  3 mg Oral Nightly    Venelex   Topical BID    lactulose  10 g Oral TID    collagenase   Topical Daily    silver nitrate applicators   Topical Once    sodium chloride flush  5-40 mL IntraVENous 2 times per day    famotidine (PEPCID) injection  20 mg IntraVENous Daily     PRN Meds: LORazepam, prochlorperazine **OR** prochlorperazine, heparin (porcine), heparin (porcine), clonazePAM, perflutren lipid microspheres, sodium chloride flush, sodium chloride, polyethylene glycol, acetaminophen **OR** acetaminophen      Intake/Output Summary (Last 24 hours) at 1/29/2023 1542  Last data filed at 1/29/2023 1000  Gross per 24 hour   Intake 1175.83 ml   Output 735 ml   Net 440.83 ml       Physical Exam Performed:    /72   Pulse (!) 106   Temp 97.7 °F (36.5 °C) (Bladder)   Resp 13   Ht 6' (1.829 m)   Wt 191 lb 9.3 oz (86.9 kg)   SpO2 91%   BMI 25.98 kg/m²     General appearance: No apparent distress, appears stated age and confused  HEENT: Pupils equal, round, and reactive to light. Conjunctivae/corneas clear. Neck: Supple, with full range of motion. No jugular venous distention. Trachea midline. Respiratory: b/l crackles   Cardiovascular: Regular rate and rhythm with normal S1/S2 without murmurs, rubs or gallops.   Abdomen: Soft, non-tender, non-distended with normal bowel sounds. Musculoskeletal:b/l leg edema, genralised anasarca  Skin: Skin color, texture, turgor normal.  No rashes or lesions. Neurologic:  not oriented  Psychiatric: Alert and oriented, thought content appropriate, normal insight  Capillary Refill: Brisk, 3 seconds, normal   Peripheral Pulses: +2 palpable, equal bilaterally       Labs:   Recent Labs     01/27/23  0538 01/28/23  0640 01/29/23  0356   WBC 16.5* 17.7* 10.8   HGB 7.0* 7.3* 7.0*   HCT 21.8* 22.9* 21.4*    261 238     Recent Labs     01/27/23  0538 01/28/23  0640 01/29/23  0356    145 144   K 3.5 3.9 3.7    113* 112*   CO2 21 25 26   BUN 26* 19 18   CREATININE 1.2 0.8 0.7*   CALCIUM 6.9* 6.9* 7.2*   PHOS  --   --  2.6     Recent Labs     01/27/23  0538 01/28/23  0640 01/29/23  0356   AST 51* 33 29   ALT 50* 46* 40   BILIDIR 0.6* 0.5* 0.4*   BILITOT 1.1* 0.9 0.7   ALKPHOS 287* 248* 241*     Recent Labs     01/27/23  0745 01/28/23  0640   INR 1.82* 1.71*     Recent Labs     01/26/23  2333 01/27/23  0327 01/27/23  0538   TROPONINI 0.18* 0.18* 0.17*       Urinalysis:      Lab Results   Component Value Date/Time    NITRU Negative 01/25/2023 01:16 PM    WBCUA >100 01/25/2023 01:16 PM    BACTERIA 3+ 01/25/2023 01:16 PM    RBCUA 0-2 01/25/2023 01:16 PM    BLOODU MODERATE 01/25/2023 01:16 PM    SPECGRAV 1.025 01/25/2023 01:16 PM    GLUCOSEU Negative 01/25/2023 01:16 PM       Radiology:  US DUP ABD PEL RETRO SCROT COMPLETE   Final Result      1. Patent portal veins, hepatic artery and hepatic veins. XR FOOT LEFT (MIN 3 VIEWS)   Final Result      1. No definite evidence of active osteomyelitis. XR FOOT RIGHT (MIN 3 VIEWS)   Final Result      1. No definite evidence of active osteomyelitis. XR CHEST PORTABLE   Final Result         1. Good position of left IJ central venous catheter in the lower SVC   2. No pneumothorax   3.  Bilateral pleural effusions and left greater than right lung airspace disease POC [address]   Final Result      CT Head W/O Contrast   Final Result      No evidence of acute intracranial abnormality. XR CHEST PORTABLE   Final Result   1. Abnormal airspace disease throughout the left hemithorax. 2. Follow-up is recommended with specific attention to obtaining a upright PA and lateral chest          IP CONSULT TO CRITICAL CARE  IP CONSULT TO HOSPITALIST  IP CONSULT TO PHARMACY  IP CONSULT TO PALLIATIVE CARE  IP CONSULT TO GENERAL SURGERY  IP CONSULT TO PODIATRY  IP CONSULT TO GI    Assessment/Plan:    Active Hospital Problems    Diagnosis     Decubitus ulcer of sacral region, stage 4 ([address] [L89.154]      Priority: Medium    Delirium [R41.0]      Priority: Medium    Septic shock ([address] [A41.9, R65.21]      Priority: Medium    Sepsis ([address] [A41.9]      Priority: Medium     Acute metabolic encephalopathy  2. Severe sepsis, pro calcitonin 43 on admission  3. Complicated UTI likely sec to urinary retention, recent o/p catheterization 1/4/2023  4. Prob acute on chronic urinary retention  5. ALISON on CKD stage 3  6. Recent Bilateral PE 12/2022 hx on eliquis, here on heparin drip  7. Acute on chronic anemia  8. Chronic pain, narcotic dependent  9. Pericardial effusion: moderate  10. Chronic anxiety, benzo dependent  11. Elevated liver enzymes, mixed pattern   12. Morbid obesity s/p gastric bypass  13. Sacral decubitus ulcer stage IV  14. Generalized anasarca/ low albumin/ malnutrition       His encephalopathy is likely multifactorial from sepsis and possible hepatic encephalopathy     Pxt has obvious features of sepsis, with source likely sec to UTI. He also has elevated liver enzymes as well as elevated NH3 level. Unclear if he has a hx of chronic liver disease, consulted GI     CT A/P from 12/2022 shows \"normal liver\" but overall picture suggests chronic liver disease, possible cirrhosis     Reviewed echo from 12/2022  Repeat echo:  Moderate circumferential pericardial effusion, no tamponade     Reviewed CT chest, CT A/P from 12/2022      Blood and urine culture     Broad spectrum antibx: Vanc and merem     Place Joseph     US RUQ to eval hepatobiliary system, suspect possible cirrhosis, pending      Check Vit B12, TSH     Check Hepatitis panel and HIV screen     Continue AC if no C/I. Monitor hgb. we will place on hep gtt. can transition to po if no more procedures. Iv albumin tid with iv lasix/ dietician consult    PTOT consult     Palliative care consult    DVT Prophylaxis: eliquis  Diet: ADULT ORAL NUTRITION SUPPLEMENT; Breakfast, Dinner; Wound Healing Oral Supplement  ADULT DIET;  Regular  Code Status: Full Code  PT/OT Eval Status: n/a     Dispo - inpatient , pending improvement      Maria D Cruz MD

## 2023-01-29 NOTE — PLAN OF CARE
Problem: Discharge Planning  Goal: Discharge to home or other facility with appropriate resources  Outcome: Progressing  Flowsheets (Taken 1/28/2023 2000)  Discharge to home or other facility with appropriate resources:   Identify barriers to discharge with patient and caregiver   Arrange for needed discharge resources and transportation as appropriate   Identify discharge learning needs (meds, wound care, etc)   Arrange for interpreters to assist at discharge as needed   Refer to discharge planning if patient needs post-hospital services based on physician order or complex needs related to functional status, cognitive ability or social support system     Problem: Pain  Goal: Verbalizes/displays adequate comfort level or baseline comfort level  Outcome: Progressing  Flowsheets (Taken 1/28/2023 2000)  Verbalizes/displays adequate comfort level or baseline comfort level:   Encourage patient to monitor pain and request assistance   Assess pain using appropriate pain scale   Implement non-pharmacological measures as appropriate and evaluate response   Administer analgesics based on type and severity of pain and evaluate response   Consider cultural and social influences on pain and pain management   Notify Licensed Independent Practitioner if interventions unsuccessful or patient reports new pain     Problem: Safety - Adult  Goal: Free from fall injury  Outcome: Progressing  Flowsheets (Taken 1/28/2023 2230)  Free From Fall Injury:   Instruct family/caregiver on patient safety   Based on caregiver fall risk screen, instruct family/caregiver to ask for assistance with transferring infant if caregiver noted to have fall risk factors     Problem: Skin/Tissue Integrity  Goal: Absence of new skin breakdown  Description: 1. Monitor for areas of redness and/or skin breakdown  2. Assess vascular access sites hourly  3. Every 4-6 hours minimum:  Change oxygen saturation probe site  4.   Every 4-6 hours:  If on nasal continuous positive airway pressure, respiratory therapy assess nares and determine need for appliance change or resting period.   Outcome: Progressing     Problem: Chronic Conditions and Co-morbidities  Goal: Patient's chronic conditions and co-morbidity symptoms are monitored and maintained or improved  Outcome: Progressing  Flowsheets (Taken 1/28/2023 2000)  Care Plan - Patient's Chronic Conditions and Co-Morbidity Symptoms are Monitored and Maintained or Improved:   Monitor and assess patient's chronic conditions and comorbid symptoms for stability, deterioration, or improvement   Collaborate with multidisciplinary team to address chronic and comorbid conditions and prevent exacerbation or deterioration   Update acute care plan with appropriate goals if chronic or comorbid symptoms are exacerbated and prevent overall improvement and discharge     Problem: ABCDS Injury Assessment  Goal: Absence of physical injury  Outcome: Progressing  Flowsheets (Taken 1/28/2023 2230)  Absence of Physical Injury: Implement safety measures based on patient assessment     Problem: Nutrition Deficit:  Goal: Optimize nutritional status  Outcome: Progressing

## 2023-01-29 NOTE — PROGRESS NOTES
General Surgery   Daily Progress Note  Patient: Kimberlee Abbott      CC: stage 4 sacral decubitus ulcer    SUBJECTIVE:   Patient rested well overnight. Denies pain on his sacrum. Tolerating diet without nausea or vomiting. Voiding appropriately without issues. Passing gas and having bowel movements. ROS:   A 14 point review of systems was conducted, significant findings as noted above. All other systems negative. OBJECTIVE:    PHYSICAL EXAM:    Vitals:    01/29/23 0515 01/29/23 0530 01/29/23 0545 01/29/23 0600   BP:    121/76   Pulse: (!) 101 (!) 101 (!) 105 99   Resp: 18 20 18 17   Temp:       TempSrc:       SpO2:       Weight:    191 lb 9.3 oz (86.9 kg)   Height:    6' (1.829 m)     General appearance: Alert, no acute distress, grooming appropriate  Eyes: No scleral icterus, EOM grossly intact  Neck: Trachea midline, no JVD, no lymphadenopathy, neck supple  Chest/Lungs: Normal effort with no accessory muscle use on RA  Cardiovascular: RRR, well-perfused  Abdomen: Soft, non-tender, non-distended, no rebound, guarding, or rigidity. Skin: Warm and dry, no rashes  Extremities: No edema, no cyanosis  Genitourinary: Stage 4 decubitus ulcer  Neuro: A&Ox3, no focal deficits, sensation intact  Sacral wound: stage 4 ulcer (7 cm x 4.5 cm x 2 cm); hemostatic, no purulence or odor; some new yellow slough at wound base      LABS:   Recent Labs     01/28/23  0640 01/29/23  0356   WBC 17.7* 10.8   HGB 7.3* 7.0*   HCT 22.9* 21.4*   .6* 104.1*    238          Recent Labs     01/28/23  0640 01/29/23  0356    144   K 3.9 3.7   * 112*   CO2 25 26   BUN 19 18   CREATININE 0.8 0.7*          Recent Labs     01/28/23  0640 01/29/23  0356   AST 33 29   ALT 46* 40   BILIDIR 0.5* 0.4*   BILITOT 0.9 0.7   ALKPHOS 248* 241*          No results for input(s): LIPASE, AMYLASE in the last 72 hours.        Recent Labs     01/27/23  0745 01/27/23  1801 01/28/23  0640 01/28/23  1304 01/28/23  2037 01/29/23  0356   PROT --   --  3.9*  --   --  4.1*   INR 1.82*  --  1.71*  --   --   --    APTT  --    < > 65.5*   < > 119.5* 124.2*    < > = values in this interval not displayed. Recent Labs     01/27/23  0327 01/27/23  0538   TROPONINI 0.18* 0.17*         ASSESSMENT & PLAN:   This is a 58 y.o. male with Hx of chronic back pain, pulmonary embolism (12/2022) on Eliquis, CKD, DM, HTN, HLD, TIA, stroke, pancreatitis, gastric bypass who presented with altered mental status from home. General surgery is following for a known stage 4 sacral ulcer now s/p bedside debridement (01/26). - Continue W-to-D Kerlix to wound and cover with sacral heart BID  - Bedside debridement today   - Will talk to primary about holding heparin gtt for 4-6 hours prior to debridement   - Will continue to follow      Navjot Hines DO, MSMEd  PGY1, General Surgery  01/29/23  7:11 AM  [employer]  Pager: [phone] [address]    Patient independently examined. Management discussed and I agree with resident/midlevel provider documentation. CC: Stage IV sacral decubitus ulcer    No acute issues overnight. No pain. Tolerating diet.     Vitals:    01/29/23 0848   BP: 118/77   Pulse: (!) 107   Resp: 16   Temp: 97.8 °F (36.6 °C)   SpO2: 91%     Stage IV sacral decubitus ulcer with fibrinous exudate at base  Labs reviewed, incl CBC, BMP    Plan for bedside debridement either today or tomorrow depending on timing of heparin drip  Cont supportive care, nutrition    Charlotte Beasley MD

## 2023-01-29 NOTE — PROGRESS NOTES
Point of Care Note  General Surgery        Time: 4303  Date: 1/29/23    Serial Debridement of Decubitus Ulcer     Two provider phone consent was obtained from patient's , Nestor Devine. Patient was identified by stated name, hospital-provided identification number, and wrist-band confirmation. Time-out was called immediately prior to positioning and all present were in agreement. Patient was then rolled into the R lateral decubitus position for ideal exposure. The skin was prepped and draped in the usual sterile manner, 1% lidocaine with epinephrine was used to anesthetize the wound edges for increased pain control. An #11 blade scalpel and scissors were used to excise additional non-viable tissue from the wound base and edges overlying presacral fascia until the wound was composed of viable, bleeding tissue. Wound was 7.2 cm x 4.8 cm x 2 cm after debridement. Pressure was applied to the area to ensure that blood loss was kept to a minimum. Kerlix gauze was then moistened with sterile saline and packed into the wound bed, followed by covering of the area with a sacral heart dressing. Patient tolerated the procedure well.      Before debridement       After debridement         Arnold Morrison MD  PGY1, General Surgery  01/29/23  5:34 PM  YJXIS#[phone]

## 2023-01-29 NOTE — PROGRESS NOTES
Wet to dry dressing change and cleaning on sacrum with sacrum heart covering. Pt is refusing lactulose at this time as he states he states it \"gives him diarrhea\" RN tried to explain that that is the goal of the medication to help with his ammonia and liver enzyme levels, but pt refuses at this time. Will continue to monitor.

## 2023-01-30 LAB
ABO/RH: NORMAL
ALBUMIN SERPL-MCNC: 2.5 G/DL (ref 3.4–5)
AMMONIA: 44 UMOL/L (ref 16–60)
ANION GAP SERPL CALCULATED.3IONS-SCNC: 10 MMOL/L (ref 3–16)
ANION GAP SERPL CALCULATED.3IONS-SCNC: 10 MMOL/L (ref 3–16)
ANTI-XA UNFRAC HEPARIN: 0.1 IU/ML (ref 0.3–0.7)
ANTI-XA UNFRAC HEPARIN: 0.2 IU/ML (ref 0.3–0.7)
ANTI-XA UNFRAC HEPARIN: 0.29 IU/ML (ref 0.3–0.7)
ANTIBODY SCREEN: NORMAL
BASOPHILS ABSOLUTE: 0 K/UL (ref 0–0.2)
BASOPHILS RELATIVE PERCENT: 0 %
BLOOD BANK DISPENSE STATUS: NORMAL
BLOOD BANK PRODUCT CODE: NORMAL
BPU ID: NORMAL
BUN BLDV-MCNC: 15 MG/DL (ref 7–20)
BUN BLDV-MCNC: 15 MG/DL (ref 7–20)
CALCIUM SERPL-MCNC: 7.9 MG/DL (ref 8.3–10.6)
CALCIUM SERPL-MCNC: 8 MG/DL (ref 8.3–10.6)
CHLORIDE BLD-SCNC: 110 MMOL/L (ref 99–110)
CHLORIDE BLD-SCNC: 111 MMOL/L (ref 99–110)
CO2: 23 MMOL/L (ref 21–32)
CO2: 26 MMOL/L (ref 21–32)
CREAT SERPL-MCNC: 0.7 MG/DL (ref 0.8–1.3)
CREAT SERPL-MCNC: 0.7 MG/DL (ref 0.8–1.3)
DESCRIPTION BLOOD BANK: NORMAL
EOSINOPHILS ABSOLUTE: 0 K/UL (ref 0–0.6)
EOSINOPHILS RELATIVE PERCENT: 0.1 %
F-ACTIN AB IGG: 20 UNITS (ref 0–19)
GFR SERPL CREATININE-BSD FRML MDRD: >60 ML/MIN/{1.73_M2}
GFR SERPL CREATININE-BSD FRML MDRD: >60 ML/MIN/{1.73_M2}
GLUCOSE BLD-MCNC: 82 MG/DL (ref 70–99)
GLUCOSE BLD-MCNC: 84 MG/DL (ref 70–99)
GLUCOSE BLD-MCNC: 90 MG/DL (ref 70–99)
HCT VFR BLD CALC: 17.5 % (ref 40.5–52.5)
HCT VFR BLD CALC: 22 % (ref 40.5–52.5)
HEMOGLOBIN: 5.7 G/DL (ref 13.5–17.5)
HEMOGLOBIN: 7.4 G/DL (ref 13.5–17.5)
LYMPHOCYTES ABSOLUTE: 1.1 K/UL (ref 1–5.1)
LYMPHOCYTES RELATIVE PERCENT: 14.9 %
MAGNESIUM: 1.8 MG/DL (ref 1.8–2.4)
MCH RBC QN AUTO: 33.6 PG (ref 26–34)
MCHC RBC AUTO-ENTMCNC: 32.5 G/DL (ref 31–36)
MCV RBC AUTO: 103.6 FL (ref 80–100)
MONOCYTES ABSOLUTE: 0.8 K/UL (ref 0–1.3)
MONOCYTES RELATIVE PERCENT: 10.3 %
NEUTROPHILS ABSOLUTE: 5.5 K/UL (ref 1.7–7.7)
NEUTROPHILS RELATIVE PERCENT: 74.7 %
PDW BLD-RTO: 15.8 % (ref 12.4–15.4)
PERFORMED ON: NORMAL
PHOSPHORUS: 3 MG/DL (ref 2.5–4.9)
PHOSPHORUS: 3.2 MG/DL (ref 2.5–4.9)
PLATELET # BLD: 198 K/UL (ref 135–450)
PMV BLD AUTO: 7.9 FL (ref 5–10.5)
POTASSIUM REFLEX MAGNESIUM: 3.4 MMOL/L (ref 3.5–5.1)
POTASSIUM SERPL-SCNC: 3.4 MMOL/L (ref 3.5–5.1)
RBC # BLD: 1.69 M/UL (ref 4.2–5.9)
SMOOTH MUSCLE AB IGG TITER: ABNORMAL
SODIUM BLD-SCNC: 144 MMOL/L (ref 136–145)
SODIUM BLD-SCNC: 146 MMOL/L (ref 136–145)
WBC # BLD: 7.4 K/UL (ref 4–11)

## 2023-01-30 PROCEDURE — 6370000000 HC RX 637 (ALT 250 FOR IP): Performed by: STUDENT IN AN ORGANIZED HEALTH CARE EDUCATION/TRAINING PROGRAM

## 2023-01-30 PROCEDURE — 2580000003 HC RX 258: Performed by: STUDENT IN AN ORGANIZED HEALTH CARE EDUCATION/TRAINING PROGRAM

## 2023-01-30 PROCEDURE — P9047 ALBUMIN (HUMAN), 25%, 50ML: HCPCS | Performed by: INTERNAL MEDICINE

## 2023-01-30 PROCEDURE — 6360000002 HC RX W HCPCS: Performed by: INTERNAL MEDICINE

## 2023-01-30 PROCEDURE — 80069 RENAL FUNCTION PANEL: CPT

## 2023-01-30 PROCEDURE — 6360000002 HC RX W HCPCS: Performed by: STUDENT IN AN ORGANIZED HEALTH CARE EDUCATION/TRAINING PROGRAM

## 2023-01-30 PROCEDURE — 36415 COLL VENOUS BLD VENIPUNCTURE: CPT

## 2023-01-30 PROCEDURE — P9016 RBC LEUKOCYTES REDUCED: HCPCS

## 2023-01-30 PROCEDURE — 94761 N-INVAS EAR/PLS OXIMETRY MLT: CPT

## 2023-01-30 PROCEDURE — 2500000003 HC RX 250 WO HCPCS: Performed by: STUDENT IN AN ORGANIZED HEALTH CARE EDUCATION/TRAINING PROGRAM

## 2023-01-30 PROCEDURE — 85014 HEMATOCRIT: CPT

## 2023-01-30 PROCEDURE — 6360000002 HC RX W HCPCS

## 2023-01-30 PROCEDURE — 86923 COMPATIBILITY TEST ELECTRIC: CPT

## 2023-01-30 PROCEDURE — 85025 COMPLETE CBC W/AUTO DIFF WBC: CPT

## 2023-01-30 PROCEDURE — 6370000000 HC RX 637 (ALT 250 FOR IP): Performed by: INTERNAL MEDICINE

## 2023-01-30 PROCEDURE — 85520 HEPARIN ASSAY: CPT

## 2023-01-30 PROCEDURE — 86900 BLOOD TYPING SEROLOGIC ABO: CPT

## 2023-01-30 PROCEDURE — 2060000000 HC ICU INTERMEDIATE R&B

## 2023-01-30 PROCEDURE — 36430 TRANSFUSION BLD/BLD COMPNT: CPT

## 2023-01-30 PROCEDURE — 82140 ASSAY OF AMMONIA: CPT

## 2023-01-30 PROCEDURE — 85018 HEMOGLOBIN: CPT

## 2023-01-30 PROCEDURE — 84100 ASSAY OF PHOSPHORUS: CPT

## 2023-01-30 PROCEDURE — 86901 BLOOD TYPING SEROLOGIC RH(D): CPT

## 2023-01-30 PROCEDURE — 86850 RBC ANTIBODY SCREEN: CPT

## 2023-01-30 PROCEDURE — 83735 ASSAY OF MAGNESIUM: CPT

## 2023-01-30 RX ORDER — POTASSIUM CHLORIDE 20 MEQ/1
40 TABLET, EXTENDED RELEASE ORAL ONCE
Status: COMPLETED | OUTPATIENT
Start: 2023-01-30 | End: 2023-01-30

## 2023-01-30 RX ORDER — MAGNESIUM SULFATE IN WATER 40 MG/ML
2000 INJECTION, SOLUTION INTRAVENOUS ONCE
Status: COMPLETED | OUTPATIENT
Start: 2023-01-30 | End: 2023-01-30

## 2023-01-30 RX ORDER — SODIUM CHLORIDE 9 MG/ML
INJECTION, SOLUTION INTRAVENOUS PRN
Status: DISCONTINUED | OUTPATIENT
Start: 2023-01-30 | End: 2023-02-04 | Stop reason: HOSPADM

## 2023-01-30 RX ADMIN — SODIUM CHLORIDE, PRESERVATIVE FREE 20 MG: 5 INJECTION INTRAVENOUS at 08:41

## 2023-01-30 RX ADMIN — LACTULOSE 10 G: 10 SOLUTION ORAL at 13:42

## 2023-01-30 RX ADMIN — DOXEPIN HYDROCHLORIDE 150 MG: 25 CAPSULE ORAL at 20:36

## 2023-01-30 RX ADMIN — Medication: at 07:00

## 2023-01-30 RX ADMIN — CEFTRIAXONE 2000 MG: 2 INJECTION, POWDER, FOR SOLUTION INTRAMUSCULAR; INTRAVENOUS at 12:23

## 2023-01-30 RX ADMIN — MAGNESIUM SULFATE HEPTAHYDRATE 2000 MG: 40 INJECTION, SOLUTION INTRAVENOUS at 09:07

## 2023-01-30 RX ADMIN — SODIUM CHLORIDE, PRESERVATIVE FREE 10 ML: 5 INJECTION INTRAVENOUS at 20:34

## 2023-01-30 RX ADMIN — HEPARIN SODIUM 3200 UNITS: 1000 INJECTION INTRAVENOUS; SUBCUTANEOUS at 02:52

## 2023-01-30 RX ADMIN — LACTULOSE 10 G: 10 SOLUTION ORAL at 08:59

## 2023-01-30 RX ADMIN — LACTULOSE 10 G: 10 SOLUTION ORAL at 20:30

## 2023-01-30 RX ADMIN — ALBUMIN (HUMAN) 25 G: 0.25 INJECTION, SOLUTION INTRAVENOUS at 20:33

## 2023-01-30 RX ADMIN — ALBUMIN (HUMAN) 25 G: 0.25 INJECTION, SOLUTION INTRAVENOUS at 13:42

## 2023-01-30 RX ADMIN — Medication 3 MG: at 20:39

## 2023-01-30 RX ADMIN — FUROSEMIDE 20 MG: 10 INJECTION, SOLUTION INTRAMUSCULAR; INTRAVENOUS at 08:42

## 2023-01-30 RX ADMIN — SODIUM CHLORIDE, PRESERVATIVE FREE 10 ML: 5 INJECTION INTRAVENOUS at 08:53

## 2023-01-30 RX ADMIN — METOPROLOL SUCCINATE 25 MG: 25 TABLET, EXTENDED RELEASE ORAL at 08:52

## 2023-01-30 RX ADMIN — HEPARIN SODIUM 3200 UNITS: 1000 INJECTION INTRAVENOUS; SUBCUTANEOUS at 09:42

## 2023-01-30 RX ADMIN — POTASSIUM CHLORIDE 40 MEQ: 1500 TABLET, EXTENDED RELEASE ORAL at 08:52

## 2023-01-30 RX ADMIN — Medication: at 20:40

## 2023-01-30 RX ADMIN — THIAMINE HYDROCHLORIDE 100 MG: 100 INJECTION, SOLUTION INTRAMUSCULAR; INTRAVENOUS at 08:41

## 2023-01-30 RX ADMIN — COLLAGENASE SANTYL: 250 OINTMENT TOPICAL at 07:27

## 2023-01-30 RX ADMIN — HEPARIN SODIUM 12 UNITS/KG/HR: 10000 INJECTION, SOLUTION INTRAVENOUS at 00:41

## 2023-01-30 RX ADMIN — ALBUMIN (HUMAN) 25 G: 0.25 INJECTION, SOLUTION INTRAVENOUS at 04:27

## 2023-01-30 ASSESSMENT — PAIN SCALES - GENERAL
PAINLEVEL_OUTOF10: 0
PAINLEVEL_OUTOF10: 3

## 2023-01-30 ASSESSMENT — PAIN DESCRIPTION - DESCRIPTORS: DESCRIPTORS: ACHING

## 2023-01-30 ASSESSMENT — PAIN DESCRIPTION - ONSET: ONSET: ON-GOING

## 2023-01-30 ASSESSMENT — PAIN DESCRIPTION - FREQUENCY: FREQUENCY: CONTINUOUS

## 2023-01-30 ASSESSMENT — PAIN - FUNCTIONAL ASSESSMENT: PAIN_FUNCTIONAL_ASSESSMENT: ACTIVITIES ARE NOT PREVENTED

## 2023-01-30 ASSESSMENT — PAIN DESCRIPTION - LOCATION: LOCATION: BACK

## 2023-01-30 ASSESSMENT — PAIN DESCRIPTION - ORIENTATION: ORIENTATION: LOWER

## 2023-01-30 ASSESSMENT — PAIN DESCRIPTION - PAIN TYPE: TYPE: CHRONIC PAIN

## 2023-01-30 NOTE — PROGRESS NOTES
Received patient awake and very confused trying to get out of bed  CVC on the Left IJ noted  IV cannula inserted on the right IJ noted  Bilateral foot dressing noted with podus boots on  Generalised edema noted  Joseph in situ  Old bruising seen  Changed sacral moist to dry dressing    Very disruptive and keeps on shouting  Became very restless and aggressive, patient scratched my arms   Doctor made aware, ordered to give Haldol IM    Patient took his Lactulose  Moving his bowels perfectly

## 2023-01-30 NOTE — PROGRESS NOTES
Patient was able to sleep and rest, repositioned for comfort and relieved pressure off    Restraints bilateral wrist still in placed since patient keeps on toggling and pulling either his central line or nichols

## 2023-01-30 NOTE — PROGRESS NOTES
Hospitalist Progress Note      PCP: Alexandra Villegas MD    Date of Admission: 1/25/2023      Subjective: seen and examined  Required restraints overnight for agitation and confusion, patient is very upset as he want to go home  Significant other at bedside  Hemoglobin dropped today getting a unit of blood, will hold heparin drip    Medications:  Reviewed    Infusion Medications    sodium chloride      [Held by provider] heparin (PORCINE) Infusion 16 Units/kg/hr (01/30/23 0944)    sodium chloride       Scheduled Medications    collagenase   Topical Daily    metoprolol succinate  25 mg Oral Daily    albumin human  25 g IntraVENous Q8H    furosemide  20 mg IntraVENous Daily    doxepin  150 mg Oral Nightly    cefTRIAXone (ROCEPHIN) IV  2,000 mg IntraVENous Q24H    thiamine  100 mg IntraVENous Daily    melatonin  3 mg Oral Nightly    Venelex   Topical BID    lactulose  10 g Oral TID    collagenase   Topical Daily    silver nitrate applicators   Topical Once    sodium chloride flush  5-40 mL IntraVENous 2 times per day    famotidine (PEPCID) injection  20 mg IntraVENous Daily     PRN Meds: sodium chloride, prochlorperazine **OR** prochlorperazine, heparin (porcine), heparin (porcine), clonazePAM, perflutren lipid microspheres, sodium chloride flush, sodium chloride, polyethylene glycol, acetaminophen **OR** acetaminophen      Intake/Output Summary (Last 24 hours) at 1/30/2023 1324  Last data filed at 1/30/2023 1110  Gross per 24 hour   Intake 777.5 ml   Output 3485 ml   Net -2707.5 ml       Physical Exam Performed:    /71   Pulse 76   Temp 99 °F (37.2 °C) (Oral)   Resp 18   Ht 6' (1.829 m)   Wt 194 lb 7.1 oz (88.2 kg)   SpO2 97%   BMI 26.37 kg/m²     General appearance: No apparent distress, appears stated age and confused  HEENT: Pupils equal, round, and reactive to light. Conjunctivae/corneas clear. Neck: Supple, with full range of motion. No jugular venous distention. Trachea midline.   Respiratory: b/l crackles   Cardiovascular: Regular rate and rhythm with normal S1/S2 without murmurs, rubs or gallops. Abdomen: Soft, non-tender, non-distended with normal bowel sounds. Musculoskeletal:b/l leg edema, genralised anasarca  Skin: Skin color, texture, turgor normal.  No rashes or lesions. Neurologic:  not oriented  Psychiatric: Alert and oriented, thought content appropriate  Capillary Refill: Brisk, 3 seconds, normal   Peripheral Pulses: +2 palpable, equal bilaterally       Labs:   Recent Labs     01/28/23  0640 01/29/23  0356 01/30/23  0601 01/30/23  1215   WBC 17.7* 10.8 7.4  --    HGB 7.3* 7.0* 5.7* 7.4*   HCT 22.9* 21.4* 17.5* 22.0*    238 198  --      Recent Labs     01/28/23  0640 01/29/23  0356 01/30/23  0601    144 146*   K 3.9 3.7 3.4*   * 112* 110   CO2 25 26 26   BUN 19 18 15   CREATININE 0.8 0.7* 0.7*   CALCIUM 6.9* 7.2* 7.9*   PHOS  --  2.6 3.0     Recent Labs     01/28/23  0640 01/29/23  0356   AST 33 29   ALT 46* 40   BILIDIR 0.5* 0.4*   BILITOT 0.9 0.7   ALKPHOS 248* 241*     Recent Labs     01/28/23  0640   INR 1.71*     No results for input(s): CKTOTAL, TROPONINI in the last 72 hours. Urinalysis:      Lab Results   Component Value Date/Time    NITRU Negative 01/25/2023 01:16 PM    WBCUA >100 01/25/2023 01:16 PM    BACTERIA 3+ 01/25/2023 01:16 PM    RBCUA 0-2 01/25/2023 01:16 PM    BLOODU MODERATE 01/25/2023 01:16 PM    SPECGRAV 1.025 01/25/2023 01:16 PM    GLUCOSEU Negative 01/25/2023 01:16 PM       Radiology:  US DUP ABD PEL RETRO SCROT COMPLETE   Final Result      1. Patent portal veins, hepatic artery and hepatic veins. XR FOOT LEFT (MIN 3 VIEWS)   Final Result      1. No definite evidence of active osteomyelitis. XR FOOT RIGHT (MIN 3 VIEWS)   Final Result      1. No definite evidence of active osteomyelitis. XR CHEST PORTABLE   Final Result         1. Good position of left IJ central venous catheter in the lower SVC   2. No pneumothorax   3.  Bilateral pleural effusions and left greater than right lung airspace disease             POC US ECHOCARDIO TRANSTHORACIC LTD   Final Result      CT Head W/O Contrast   Final Result      No evidence of acute intracranial abnormality. XR CHEST PORTABLE   Final Result   1. Abnormal airspace disease throughout the left hemithorax. 2. Follow-up is recommended with specific attention to obtaining a upright PA and lateral chest          IP CONSULT TO CRITICAL CARE  IP CONSULT TO HOSPITALIST  IP CONSULT TO PHARMACY  IP CONSULT TO PALLIATIVE CARE  IP CONSULT TO GENERAL SURGERY  IP CONSULT TO PODIATRY  IP CONSULT TO GI    Assessment/Plan:    Active Hospital Problems    Diagnosis     Decubitus ulcer of sacral region, stage 4 ([address] [L89.154]      Priority: Medium    Delirium [R41.0]      Priority: Medium    Septic shock ([address] [A41.9, R65.21]      Priority: Medium    Sepsis ([address] [A41.9]      Priority: Medium     Acute metabolic encephalopathy:-Likely due to septic shock, acute liver failure. At home patient is on Seroquel Klonopin and doxepin  -Mental status is waxing and waning  -Ammonia is elevated, etiology unclear at present, GI is consulted, pending recs  Follow-up and anti SM, anti BENJAMÍN antibodies  -CT A/P from 12/2022 shows \"normal liver\" but overall picture suggests chronic liver disease, possible cirrhosis  -US RUQ to eval hepatobiliary system, suspect possible cirrhosis, pending     2. Severe sepsis, pro calcitonin 43 on admission, due to UTI, he has sacral decubitus stage IV ulcer, diabetic foot ulcers  Currently on IV antibiotics  Cultures reviewed  Trend lactate    3. Complicated UTI likely sec to urinary retention, recent o/p catheterization 1/4/2023. Prob acute on chronic urinary retention    4.   Generalized anasarca/pericardial effusion/ low albumin/ malnutrion:-History of large free-flowing pericardial effusion and prominent epicardial fat with LVEF 60 to 60% from December at [facility].  -Started on albumin 3 times daily and Lasix  -dietician consult   -Repeat echo: Moderate circumferential pericardial effusion, no tamponade    5. ALISON on CKD stage 3:-Baseline of 1.3 creatinine, was treated with IV fluids for septic shock    6. Recent Bilateral PE 12/2022 hx on eliquis, here on heparin drip    7. Acute on chronic anemia:-We will hold heparin drip, can resume Eliquis if hemoglobin is stable    8. Chronic pain, narcotic dependent  9. Chronic anxiety, benzo dependent  10. Morbid obesity s/p gastric bypass       PTOT consult     Palliative care consult    DVT Prophylaxis: eliquis  Diet: ADULT ORAL NUTRITION SUPPLEMENT; Breakfast, Dinner; Wound Healing Oral Supplement  ADULT DIET; Regular  Code Status: Full Code  PT/OT Eval Status: n/a     Dispo - inpatient , pending improvement, PT OT ordered, getting unit of blood, will likely need placement.        Keely Tobar MD

## 2023-01-30 NOTE — PROGRESS NOTES
Podiatric Surgery Daily Progress Note  Maia Acosta      Subjective :   Patient seen and examined this am at the bedside. Unable to obtain a current HPI from patient due to current mentation. Review of Systems: Unable to be obtained due to patients current mentation      Objective     /70   Pulse 85   Temp 98.6 °F (37 °C) (Bladder)   Resp 17   Ht 6' (1.829 m)   Wt 191 lb 9.3 oz (86.9 kg)   SpO2 91%   BMI 25.98 kg/m²      I/O:  Intake/Output Summary (Last 24 hours) at 1/30/2023 0620  Last data filed at 1/30/2023 0600  Gross per 24 hour   Intake 240 ml   Output 3075 ml   Net -2835 ml            Wt Readings from Last 3 Encounters:   01/29/23 191 lb 9.3 oz (86.9 kg)   08/09/19 228 lb (103.4 kg)   03/17/17 231 lb (104.8 kg)     LABS:    Recent Labs     01/28/23  0640 01/29/23  0356   WBC 17.7* 10.8   HGB 7.3* 7.0*   HCT 22.9* 21.4*    238        Recent Labs     01/29/23  0356      K 3.7   *   CO2 26   PHOS 2.6   BUN 18   CREATININE 0.7*        Recent Labs     01/27/23  0745 01/27/23  1801 01/28/23  0640 01/28/23  1304 01/28/23  2037 01/29/23  0356   PROT  --   --  3.9*  --   --  4.1*   INR 1.82*  --  1.71*  --   --   --    APTT  --    < > 65.5*   < > 119.5* 124.2*    < > = values in this interval not displayed. LOWER EXTREMITY EXAMINATION    Dressing to bilateral LE intact. No strikethrough noted to the external dressing. No drainage noted to the internal layers of the dressing. VASCULAR: DP and PT pulses faintly palpable 1/4 bilateral.  Upon hand-held Doppler examination, DP and PT signals biphasic bilateral. CFT is brisk to the digits of the foot b/l. Skin temperature is warm to cool from proximal to distal with no focal calor noted. Scant nonpitting edema noted bilateral. No pain with calf compression b/l. NEUROLOGIC: Unable to be obtained due to patients current mentation.       DERMATOLOGIC:   Right lower extremity has a full-thickness ulceration noted to the medial aspect of the first metatarsophalangeal joint with overlying devitalized tissue. Deep rubor noted to wound base. No erythema, crepitance, fluctuance, drainage, or malodor noted. Nonblanchable rubor noted to posterior heel. No open wound noted at this time. No erythema, crepitance, fluctuance, drainage, or malodor noted. Patient provided verbal consent for today's photos. Left lower extremity has a full-thickness ulceration noted to the lateral aspect of the fifth metatarsal head. Wound base necrotic with a well adhered eschar and a hyperkeratotic periwound. No fluctuance, crepitus, erythema, drainage, or malodor noted. Wound does not probe to bone, tunnel, or track. Nonblanchable rubor noted to the posterior heel. No open wound noted. No fluctuance, crepitus, erythema, drainage, or malodor noted. MUSCULOSKELETAL: Muscle strength deferred. Tenderness with palpation of the periwound areas bilateral feet. Ankle joint ROM is decreased in dorsiflexion with the knee extended. No obvious biomechanical abnormalities. IMAGING:    X-ray left foot 1/26/2023  Narrative   FINDINGS:       Deformity of the first toe with fusion of the interphalangeal joint, the previously demonstrated screw, and deformity of the head of the first metatarsal and arthrosis. Deformity of the second toe also present with arthrosis of the second    metatarsophalangeal joint and resorption of the distal aspect of the proximal phalanx. Dressing overlies the foot laterally. No definite erosion or periosteal reaction to suggest osteomyelitis. Soft tissue swelling present. Moderate-sized plantar calcaneal bone spur. Impression   1. No definite evidence of active osteomyelitis. X-ray right foot 1/26/2023  Narrative   FINDINGS:       Scoliosis fixed the interphalangeal joint of the first toe. Mild arthrosis of the first metatarsophalangeal joint. Mild adjacent soft tissue swelling.  No definite evidence of active osteomyelitis. Plantar calcaneal bone spur. Impression   1. No definite evidence of active osteomyelitis. ASSESSMENT/PLAN  Diabetic foot ulceration, right medial first MPJ, Cassidy 2  Diabetic foot ulceration, left lateral fifth metatarsal, Cassidy 2  Deep tissue pressure injury, bilateral heels  Diabetes mellitus type 2 with peripheral neuropathy     -Patient examined and evaluated at the bedside   -VSS. No Leukocytosis noted (WBC 7.4)  -ESR <1, .1  -Prealbumin 3.7; ordered wound healing nutritional supplement  -X-rays reviewed, noted above  -Blood culture 1 (1/25/2023): Streptococcus  -Dressing applied to right lower extremity consisting of Betadine soaked gauze, dry gauze, Kerlix, and loose Ace bandage  -Dressing applied to left lower extremity consisting of Santyl, saline soaked gauze, dry gauze, Kerlix, and loose Ace bandage  -Antibiotics not indicated from podiatric standpoint at this time  -Weightbearing as tolerated bilateral lower extremity  -Podiatry will follow patient peripherally. Nursing communication placed for daily dressing changes to bilateral lower extremity as of above. DISPO: Diabetic foot ulcerations bilateral; stable. No antibiotics indicated from podiatric standpoint. Podiatry will continue to follow patient peripherally. Nursing communication placed for daily dressing changes to bilateral lower extremity.       Patient assessment and plan discussed with Dr. Stephy Russ DPM.    Katty Callahan DPM  Podiatric Resident, PGY-2  Pager #: [phone] or [employer]

## 2023-01-30 NOTE — PROGRESS NOTES
Wound Care to resume care of sacral pressure injury. Reviewed photo taken this morning. Wound care orders updated. Will follow up tomorrow.

## 2023-01-30 NOTE — PROGRESS NOTES
General Surgery   Daily Progress Note  Patient: Renetta White      CC: stage 4 sacral decubitus ulcer    SUBJECTIVE:   Patient rested well overnight. However, he is currently confused. Denies pain on his sacrum. Tolerating diet without nausea or vomiting. Voiding appropriately without issues. Passing gas and having loose brown bowel movements. Intermittent tachycardia, otherwise HDS.    ROS:   A 14 point review of systems was conducted, significant findings as noted above. All other systems negative. OBJECTIVE:    PHYSICAL EXAM:    Vitals:    01/30/23 0400 01/30/23 0450 01/30/23 0500 01/30/23 0600   BP: 112/80  122/79 118/70   Pulse: (!) 106 89 (!) 112 85   Resp: 16  25 17   Temp: 98.1 °F (36.7 °C)   98.6 °F (37 °C)   TempSrc:    Bladder   SpO2: 93%   91%   Weight:       Height:           Eyes: No scleral icterus, EOM grossly intact  Neck: Trachea midline, no JVD, no lymphadenopathy, neck supple  Chest/Lungs: Normal effort with no accessory muscle use on RA  Cardiovascular: RRR, well-perfused  Abdomen: Soft, non-tender, non-distended, no rebound, guarding, or rigidity. Skin: Warm and dry, no rashes  Extremities: No edema, no cyanosis  Genitourinary: Stage 4 decubitus ulcer  Neuro: A&Ox3, no focal deficits, sensation intact  Sacral wound: stage 4 ulcer (7 cm x 4.5 cm x 2 cm); hemostatic, no purulence or odor; some yellow slough at wound base          LABS:   Recent Labs     01/29/23  0356 01/30/23  0601   WBC 10.8 7.4   HGB 7.0* 5.7*   HCT 21.4* 17.5*   .1* 103.6*    198        Recent Labs     01/28/23  0640 01/29/23  0356    144   K 3.9 3.7   * 112*   CO2 25 26   PHOS  --  2.6   BUN 19 18   CREATININE 0.8 0.7*        Recent Labs     01/28/23  0640 01/29/23  0356   AST 33 29   ALT 46* 40   BILIDIR 0.5* 0.4*   BILITOT 0.9 0.7   ALKPHOS 248* 241*      No results for input(s): LIPASE, AMYLASE in the last 72 hours.      Recent Labs     01/27/23  0745 01/27/23  1801 01/28/23  2168 01/28/23  1304 01/28/23  2037 01/29/23  0356   PROT  --   --  3.9*  --   --  4.1*   INR 1.82*  --  1.71*  --   --   --    APTT  --    < > 65.5*   < > 119.5* 124.2*    < > = values in this interval not displayed. No results for input(s): CKTOTAL, CKMB, CKMBINDEX, TROPONINI in the last 72 hours. ASSESSMENT & PLAN:   This is a 58 y.o. male with Hx of chronic back pain, pulmonary embolism (12/2022) on Eliquis, CKD, DM, HTN, HLD, TIA, stroke, pancreatitis, gastric bypass who presented with altered mental status from home. General surgery is following for a known stage 4 sacral ulcer now s/p bedside debridement (01/26). - Hgb 5.7 this AM, primary notified per RN  - Continue W-to-D Kerlix to wound and cover with sacral heart BID  - Will debride PRN  - Continue heparin gtt  - Will have WOCN RN follow this wound.  Please call with questions      Claudette Sam DO, [address]  PGY-1, General Surgery  01/30/23  7:51 AM  [employer]  Pager: [phone]

## 2023-01-30 NOTE — PROGRESS NOTES
Hans (Spouse) called this morning and blood consent confirmed over phone.      Electronically signed by Maritza Hardy RN on 1/30/2023 at 7:58 AM

## 2023-01-30 NOTE — PLAN OF CARE
Problem: Safety - Adult  Goal: Free from fall injury  Outcome: Progressing  Flowsheets (Taken 1/30/2023 0450)  Free From Fall Injury: Instruct family/caregiver on patient safety     Problem: Chronic Conditions and Co-morbidities  Goal: Patient's chronic conditions and co-morbidity symptoms are monitored and maintained or improved  Outcome: Progressing  Flowsheets (Taken 1/29/2023 2000)  Care Plan - Patient's Chronic Conditions and Co-Morbidity Symptoms are Monitored and Maintained or Improved:   Monitor and assess patient's chronic conditions and comorbid symptoms for stability, deterioration, or improvement   Collaborate with multidisciplinary team to address chronic and comorbid conditions and prevent exacerbation or deterioration     Problem: ABCDS Injury Assessment  Goal: Absence of physical injury  Outcome: Progressing  Flowsheets (Taken 1/30/2023 0450)  Absence of Physical Injury: Implement safety measures based on patient assessment

## 2023-01-31 LAB
ALBUMIN SERPL-MCNC: 2.6 G/DL (ref 3.4–5)
ALP BLD-CCNC: 179 U/L (ref 40–129)
ALT SERPL-CCNC: 34 U/L (ref 10–40)
ANION GAP SERPL CALCULATED.3IONS-SCNC: 8 MMOL/L (ref 3–16)
AST SERPL-CCNC: 38 U/L (ref 15–37)
BASOPHILS ABSOLUTE: 0 K/UL (ref 0–0.2)
BASOPHILS RELATIVE PERCENT: 0.1 %
BILIRUB SERPL-MCNC: 0.8 MG/DL (ref 0–1)
BILIRUBIN DIRECT: 0.4 MG/DL (ref 0–0.3)
BILIRUBIN, INDIRECT: 0.4 MG/DL (ref 0–1)
BUN BLDV-MCNC: 11 MG/DL (ref 7–20)
CALCIUM SERPL-MCNC: 7.9 MG/DL (ref 8.3–10.6)
CHLORIDE BLD-SCNC: 110 MMOL/L (ref 99–110)
CO2: 27 MMOL/L (ref 21–32)
CREAT SERPL-MCNC: 0.7 MG/DL (ref 0.8–1.3)
EKG ATRIAL RATE: 102 BPM
EKG DIAGNOSIS: NORMAL
EKG Q-T INTERVAL: 330 MS
EKG QRS DURATION: 104 MS
EKG QTC CALCULATION (BAZETT): 476 MS
EKG R AXIS: 70 DEGREES
EKG T AXIS: 51 DEGREES
EKG VENTRICULAR RATE: 125 BPM
EOSINOPHILS ABSOLUTE: 0 K/UL (ref 0–0.6)
EOSINOPHILS RELATIVE PERCENT: 0.3 %
GFR SERPL CREATININE-BSD FRML MDRD: >60 ML/MIN/{1.73_M2}
GLUCOSE BLD-MCNC: 79 MG/DL (ref 70–99)
HCT VFR BLD CALC: 21.3 % (ref 40.5–52.5)
HEMOGLOBIN: 7.1 G/DL (ref 13.5–17.5)
LYMPHOCYTES ABSOLUTE: 1.2 K/UL (ref 1–5.1)
LYMPHOCYTES RELATIVE PERCENT: 13.7 %
MAGNESIUM: 1.9 MG/DL (ref 1.8–2.4)
MCH RBC QN AUTO: 34.3 PG (ref 26–34)
MCHC RBC AUTO-ENTMCNC: 33.5 G/DL (ref 31–36)
MCV RBC AUTO: 102.5 FL (ref 80–100)
MONOCYTES ABSOLUTE: 0.9 K/UL (ref 0–1.3)
MONOCYTES RELATIVE PERCENT: 10.2 %
NEUTROPHILS ABSOLUTE: 6.9 K/UL (ref 1.7–7.7)
NEUTROPHILS RELATIVE PERCENT: 75.7 %
PDW BLD-RTO: 15.7 % (ref 12.4–15.4)
PLATELET # BLD: 183 K/UL (ref 135–450)
PMV BLD AUTO: 7.8 FL (ref 5–10.5)
POTASSIUM REFLEX MAGNESIUM: 3.2 MMOL/L (ref 3.5–5.1)
RBC # BLD: 2.08 M/UL (ref 4.2–5.9)
SODIUM BLD-SCNC: 145 MMOL/L (ref 136–145)
TOTAL PROTEIN: 4.8 G/DL (ref 6.4–8.2)
WBC # BLD: 9 K/UL (ref 4–11)

## 2023-01-31 PROCEDURE — 6370000000 HC RX 637 (ALT 250 FOR IP): Performed by: STUDENT IN AN ORGANIZED HEALTH CARE EDUCATION/TRAINING PROGRAM

## 2023-01-31 PROCEDURE — 99223 1ST HOSP IP/OBS HIGH 75: CPT | Performed by: INTERNAL MEDICINE

## 2023-01-31 PROCEDURE — 6360000002 HC RX W HCPCS: Performed by: INTERNAL MEDICINE

## 2023-01-31 PROCEDURE — 2580000003 HC RX 258: Performed by: INTERNAL MEDICINE

## 2023-01-31 PROCEDURE — 2580000003 HC RX 258: Performed by: STUDENT IN AN ORGANIZED HEALTH CARE EDUCATION/TRAINING PROGRAM

## 2023-01-31 PROCEDURE — 2500000003 HC RX 250 WO HCPCS: Performed by: INTERNAL MEDICINE

## 2023-01-31 PROCEDURE — 93010 ELECTROCARDIOGRAM REPORT: CPT | Performed by: INTERNAL MEDICINE

## 2023-01-31 PROCEDURE — 6370000000 HC RX 637 (ALT 250 FOR IP): Performed by: INTERNAL MEDICINE

## 2023-01-31 PROCEDURE — 85025 COMPLETE CBC W/AUTO DIFF WBC: CPT

## 2023-01-31 PROCEDURE — A4216 STERILE WATER/SALINE, 10 ML: HCPCS | Performed by: STUDENT IN AN ORGANIZED HEALTH CARE EDUCATION/TRAINING PROGRAM

## 2023-01-31 PROCEDURE — 2060000000 HC ICU INTERMEDIATE R&B

## 2023-01-31 PROCEDURE — P9047 ALBUMIN (HUMAN), 25%, 50ML: HCPCS | Performed by: INTERNAL MEDICINE

## 2023-01-31 PROCEDURE — 6360000002 HC RX W HCPCS: Performed by: STUDENT IN AN ORGANIZED HEALTH CARE EDUCATION/TRAINING PROGRAM

## 2023-01-31 PROCEDURE — 80048 BASIC METABOLIC PNL TOTAL CA: CPT

## 2023-01-31 PROCEDURE — 83735 ASSAY OF MAGNESIUM: CPT

## 2023-01-31 PROCEDURE — 80076 HEPATIC FUNCTION PANEL: CPT

## 2023-01-31 PROCEDURE — 2500000003 HC RX 250 WO HCPCS: Performed by: STUDENT IN AN ORGANIZED HEALTH CARE EDUCATION/TRAINING PROGRAM

## 2023-01-31 PROCEDURE — 93005 ELECTROCARDIOGRAM TRACING: CPT | Performed by: INTERNAL MEDICINE

## 2023-01-31 RX ORDER — POTASSIUM CHLORIDE 20 MEQ/1
40 TABLET, EXTENDED RELEASE ORAL ONCE
Status: COMPLETED | OUTPATIENT
Start: 2023-01-31 | End: 2023-01-31

## 2023-01-31 RX ORDER — DIGOXIN 0.25 MG/ML
250 INJECTION INTRAMUSCULAR; INTRAVENOUS DAILY
Status: COMPLETED | OUTPATIENT
Start: 2023-01-31 | End: 2023-01-31

## 2023-01-31 RX ORDER — LORAZEPAM 2 MG/ML
0.5 INJECTION INTRAMUSCULAR EVERY 6 HOURS PRN
Status: DISCONTINUED | OUTPATIENT
Start: 2023-01-31 | End: 2023-02-04 | Stop reason: HOSPADM

## 2023-01-31 RX ORDER — METOPROLOL TARTRATE 5 MG/5ML
5 INJECTION INTRAVENOUS
Status: COMPLETED | OUTPATIENT
Start: 2023-01-31 | End: 2023-01-31

## 2023-01-31 RX ORDER — DILTIAZEM HYDROCHLORIDE 5 MG/ML
10 INJECTION INTRAVENOUS ONCE
Status: COMPLETED | OUTPATIENT
Start: 2023-01-31 | End: 2023-01-31

## 2023-01-31 RX ORDER — POTASSIUM CHLORIDE 20 MEQ/1
20 TABLET, EXTENDED RELEASE ORAL 2 TIMES DAILY WITH MEALS
Status: COMPLETED | OUTPATIENT
Start: 2023-01-31 | End: 2023-02-01

## 2023-01-31 RX ORDER — LORAZEPAM 2 MG/ML
0.5 INJECTION INTRAMUSCULAR ONCE
Status: COMPLETED | OUTPATIENT
Start: 2023-01-31 | End: 2023-01-31

## 2023-01-31 RX ORDER — METOPROLOL TARTRATE 5 MG/5ML
5 INJECTION INTRAVENOUS ONCE
Status: COMPLETED | OUTPATIENT
Start: 2023-01-31 | End: 2023-01-31

## 2023-01-31 RX ADMIN — POTASSIUM CHLORIDE 40 MEQ: 1500 TABLET, EXTENDED RELEASE ORAL at 06:06

## 2023-01-31 RX ADMIN — LACTULOSE 10 G: 10 SOLUTION ORAL at 09:27

## 2023-01-31 RX ADMIN — COLLAGENASE SANTYL: 250 OINTMENT TOPICAL at 13:00

## 2023-01-31 RX ADMIN — LACTULOSE 10 G: 10 SOLUTION ORAL at 20:13

## 2023-01-31 RX ADMIN — ALBUMIN (HUMAN) 25 G: 0.25 INJECTION, SOLUTION INTRAVENOUS at 04:19

## 2023-01-31 RX ADMIN — LORAZEPAM 0.5 MG: 2 INJECTION INTRAMUSCULAR; INTRAVENOUS at 16:57

## 2023-01-31 RX ADMIN — DIGOXIN 250 MCG: 250 INJECTION, SOLUTION INTRAMUSCULAR; INTRAVENOUS at 14:58

## 2023-01-31 RX ADMIN — METOPROLOL TARTRATE 5 MG: 1 INJECTION, SOLUTION INTRAVENOUS at 06:52

## 2023-01-31 RX ADMIN — THIAMINE HYDROCHLORIDE 100 MG: 100 INJECTION, SOLUTION INTRAMUSCULAR; INTRAVENOUS at 09:23

## 2023-01-31 RX ADMIN — ACETAMINOPHEN 650 MG: 325 TABLET, FILM COATED ORAL at 06:13

## 2023-01-31 RX ADMIN — POTASSIUM CHLORIDE 20 MEQ: 1500 TABLET, EXTENDED RELEASE ORAL at 11:40

## 2023-01-31 RX ADMIN — Medication: at 20:18

## 2023-01-31 RX ADMIN — LACTULOSE 10 G: 10 SOLUTION ORAL at 13:39

## 2023-01-31 RX ADMIN — METOPROLOL SUCCINATE 25 MG: 25 TABLET, EXTENDED RELEASE ORAL at 09:42

## 2023-01-31 RX ADMIN — FUROSEMIDE 20 MG: 10 INJECTION, SOLUTION INTRAMUSCULAR; INTRAVENOUS at 09:24

## 2023-01-31 RX ADMIN — ALBUMIN (HUMAN) 25 G: 0.25 INJECTION, SOLUTION INTRAVENOUS at 12:23

## 2023-01-31 RX ADMIN — DOXEPIN HYDROCHLORIDE 150 MG: 25 CAPSULE ORAL at 20:13

## 2023-01-31 RX ADMIN — Medication 3 MG: at 20:13

## 2023-01-31 RX ADMIN — Medication: at 10:54

## 2023-01-31 RX ADMIN — CLONAZEPAM 1 MG: 1 TABLET ORAL at 06:14

## 2023-01-31 RX ADMIN — DIGOXIN 250 MCG: 250 INJECTION, SOLUTION INTRAMUSCULAR; INTRAVENOUS at 19:43

## 2023-01-31 RX ADMIN — METOPROLOL TARTRATE 5 MG: 1 INJECTION, SOLUTION INTRAVENOUS at 10:05

## 2023-01-31 RX ADMIN — SODIUM CHLORIDE: 9 INJECTION, SOLUTION INTRAVENOUS at 10:47

## 2023-01-31 RX ADMIN — SODIUM CHLORIDE, PRESERVATIVE FREE 20 MG: 5 INJECTION INTRAVENOUS at 09:25

## 2023-01-31 RX ADMIN — SODIUM CHLORIDE, PRESERVATIVE FREE 10 ML: 5 INJECTION INTRAVENOUS at 09:28

## 2023-01-31 RX ADMIN — CEFTRIAXONE 2000 MG: 2 INJECTION, POWDER, FOR SOLUTION INTRAMUSCULAR; INTRAVENOUS at 10:52

## 2023-01-31 RX ADMIN — ALBUMIN (HUMAN) 25 G: 0.25 INJECTION, SOLUTION INTRAVENOUS at 20:09

## 2023-01-31 RX ADMIN — POTASSIUM CHLORIDE 20 MEQ: 1500 TABLET, EXTENDED RELEASE ORAL at 19:41

## 2023-01-31 RX ADMIN — LORAZEPAM 0.5 MG: 2 INJECTION INTRAMUSCULAR; INTRAVENOUS at 17:44

## 2023-01-31 RX ADMIN — SODIUM CHLORIDE, PRESERVATIVE FREE 10 ML: 5 INJECTION INTRAVENOUS at 20:14

## 2023-01-31 RX ADMIN — DILTIAZEM HYDROCHLORIDE 10 MG: 5 INJECTION, SOLUTION INTRAVENOUS at 11:28

## 2023-01-31 RX ADMIN — DILTIAZEM HYDROCHLORIDE 5 MG/HR: 5 INJECTION, SOLUTION INTRAVENOUS at 11:35

## 2023-01-31 RX ADMIN — DILTIAZEM HYDROCHLORIDE 10 MG/HR: 5 INJECTION, SOLUTION INTRAVENOUS at 16:06

## 2023-01-31 ASSESSMENT — PAIN DESCRIPTION - LOCATION
LOCATION: GENERALIZED
LOCATION: BACK

## 2023-01-31 ASSESSMENT — PAIN DESCRIPTION - PAIN TYPE
TYPE: CHRONIC PAIN
TYPE: CHRONIC PAIN

## 2023-01-31 ASSESSMENT — PAIN SCALES - GENERAL
PAINLEVEL_OUTOF10: 0
PAINLEVEL_OUTOF10: 8
PAINLEVEL_OUTOF10: 0
PAINLEVEL_OUTOF10: 0
PAINLEVEL_OUTOF10: 6
PAINLEVEL_OUTOF10: 5

## 2023-01-31 ASSESSMENT — PAIN DESCRIPTION - ONSET
ONSET: ON-GOING
ONSET: ON-GOING

## 2023-01-31 ASSESSMENT — PAIN DESCRIPTION - FREQUENCY
FREQUENCY: CONTINUOUS
FREQUENCY: CONTINUOUS

## 2023-01-31 ASSESSMENT — PAIN - FUNCTIONAL ASSESSMENT
PAIN_FUNCTIONAL_ASSESSMENT: ACTIVITIES ARE NOT PREVENTED
PAIN_FUNCTIONAL_ASSESSMENT: ACTIVITIES ARE NOT PREVENTED

## 2023-01-31 ASSESSMENT — PAIN DESCRIPTION - DESCRIPTORS
DESCRIPTORS: ACHING
DESCRIPTORS: ACHING

## 2023-01-31 ASSESSMENT — PAIN DESCRIPTION - ORIENTATION
ORIENTATION: RIGHT;LEFT;POSTERIOR
ORIENTATION: RIGHT;POSTERIOR;LEFT

## 2023-01-31 NOTE — PROGRESS NOTES
01/31/23 1521   Encounter Summary   Encounter Overview/Reason  Initial Encounter   Service Provided For: Patient   Referral/Consult From: [address] [place] [address] Significant other;Family members   Last Encounter  01/31/23  (Listening, prayer. [facility])   Complexity of Encounter Moderate   Begin Time 1454   End Time  1523   Total Time Calculated 29 min   Encounter    Type Initial Screen/Assessment   Spiritual/Emotional needs   Type Spiritual Support;Emotional Distress   Assessment/Intervention/Outcome   Assessment Angry; Compromised coping; Impaired resilience; Powerlessness;Social isolation;Stress overload   Intervention Active listening;Discussed illness injury and its impact; Discussed relationship with God;Explored/Affirmed feelings, thoughts, concerns;Explored Coping Skills/Resources;Prayer (assurance of)/[place];Sustaining Presence/Ministry of presence   Outcome Connection/Belonging;Engaged in conversation;Expressed feelings, needs, and concerns;Venting emotion

## 2023-01-31 NOTE — PROGRESS NOTES
Physical Therapy/Occupational Therapy  Hold    PT/OT orders received, chart reviewed. Consulted RN who states pt is no longer in restraints but now limited by tachycardia, asking therapy to hold for now. PT/OT will follow up this afternoon vs tomorrow as able.       Yaneli Ennis PT, DPT, NCS, [facility], OTR/L, [id number]

## 2023-01-31 NOTE — PROGRESS NOTES
Per nursing staff pt has become increasingly agitated over the course of the day, repeating he wants to go home, trying to get up, yelling at staff, pulling at lines. Spouse is at work and not able to come to try to calm patient, and he is not responding to re-direction from nursing staff. He is refusing to take meds. For pt safety, order for IV lorazepam was placed to attempt to calm agitation. Also due to pt trying to pull at lines, an order for soft wrist restraints has been placed.

## 2023-01-31 NOTE — PROGRESS NOTES
Hospitalist Progress Note      PCP: Veda Villanueva MD    Date of Admission: 1/25/2023      Subjective: seen and examined  Pt having some  confusion, patient is very upset as he want to go home. Explained that he is having some medical issues that preclude him from going home, and he thinks he can go home and see his PC. Significant other at bedside, reports pt has no known HX afib, although pt states he does.      Medications:  Reviewed    Infusion Medications    dilTIAZem 5 mg/hr (01/31/23 1135)    sodium chloride      [Held by provider] heparin (PORCINE) Infusion Stopped (01/30/23 1658)    sodium chloride 10 mL/hr at 01/31/23 1047     Scheduled Medications    potassium chloride  20 mEq Oral BID WC    digoxin  250 mcg IntraVENous Daily    collagenase   Topical Daily    metoprolol succinate  25 mg Oral Daily    albumin human  25 g IntraVENous Q8H    furosemide  20 mg IntraVENous Daily    doxepin  150 mg Oral Nightly    cefTRIAXone (ROCEPHIN) IV  2,000 mg IntraVENous Q24H    thiamine  100 mg IntraVENous Daily    melatonin  3 mg Oral Nightly    Venelex   Topical BID    lactulose  10 g Oral TID    collagenase   Topical Daily    silver nitrate applicators   Topical Once    sodium chloride flush  5-40 mL IntraVENous 2 times per day    famotidine (PEPCID) injection  20 mg IntraVENous Daily     PRN Meds: sodium chloride, prochlorperazine **OR** prochlorperazine, heparin (porcine), heparin (porcine), clonazePAM, perflutren lipid microspheres, sodium chloride flush, sodium chloride, polyethylene glycol, acetaminophen **OR** acetaminophen      Intake/Output Summary (Last 24 hours) at 1/31/2023 1547  Last data filed at 1/31/2023 1224  Gross per 24 hour   Intake 510 ml   Output 1875 ml   Net -1365 ml         Physical Exam Performed:    BP (!) 115/91   Pulse (!) 130   Temp 97.7 °F (36.5 °C) (Oral)   Resp 18   Ht 6' (1.829 m)   Wt 184 lb 1.4 oz (83.5 kg)   SpO2 100%   BMI 24.97 kg/m²     General appearance: No apparent distress, appears stated age and confused  HEENT: Pupils equal, round, and reactive to light. Conjunctivae/corneas clear. Neck: Supple, with full range of motion. No jugular venous distention. Trachea midline. Respiratory: b/l crackles   Cardiovascular: Regular rate and rhythm with normal S1/S2 without murmurs, rubs or gallops. Abdomen: Soft, non-tender, non-distended with normal bowel sounds. Musculoskeletal:b/l leg edema, genralised anasarca  Skin: Skin color, texture, turgor normal.  No rashes or lesions. Neurologic:  not oriented  Psychiatric: Alert and oriented, thought content appropriate  Capillary Refill: Brisk, 3 seconds, normal   Peripheral Pulses: +2 palpable, equal bilaterally       Labs:   Recent Labs     01/29/23  0356 01/30/23  0601 01/30/23  1215 01/31/23  0418   WBC 10.8 7.4  --  9.0   HGB 7.0* 5.7* 7.4* 7.1*   HCT 21.4* 17.5* 22.0* 21.3*    198  --  183       Recent Labs     01/29/23  0356 01/30/23  0601 01/31/23  0418    144  146* 145   K 3.7 3.4*  3.4* 3.2*   * 111*  110 110   CO2 26 23  26 27   BUN 18 15  15 11   CREATININE 0.7* 0.7*  0.7* 0.7*   CALCIUM 7.2* 8.0*  7.9* 7.9*   PHOS 2.6 3.2  3.0  --        Recent Labs     01/29/23  0356 01/31/23  0418   AST 29 38*   ALT 40 34   BILIDIR 0.4* 0.4*   BILITOT 0.7 0.8   ALKPHOS 241* 179*       No results for input(s): INR in the last 72 hours. No results for input(s): Estle Carrow in the last 72 hours. Urinalysis:      Lab Results   Component Value Date/Time    NITRU Negative 01/25/2023 01:16 PM    WBCUA >100 01/25/2023 01:16 PM    BACTERIA 3+ 01/25/2023 01:16 PM    RBCUA 0-2 01/25/2023 01:16 PM    BLOODU MODERATE 01/25/2023 01:16 PM    SPECGRAV 1.025 01/25/2023 01:16 PM    GLUCOSEU Negative 01/25/2023 01:16 PM       Radiology:  US DUP ABD PEL RETRO SCROT COMPLETE   Final Result      1. Patent portal veins, hepatic artery and hepatic veins. XR FOOT LEFT (MIN 3 VIEWS)   Final Result      1.  No definite evidence of active osteomyelitis. XR FOOT RIGHT (MIN 3 VIEWS)   Final Result      1. No definite evidence of active osteomyelitis. XR CHEST PORTABLE   Final Result         1. Good position of left IJ central venous catheter in the lower SVC   2. No pneumothorax   3. Bilateral pleural effusions and left greater than right lung airspace disease             POC US ECHOCARDIO TRANSTHORACIC LTD   Final Result      CT Head W/O Contrast   Final Result      No evidence of acute intracranial abnormality. XR CHEST PORTABLE   Final Result   1. Abnormal airspace disease throughout the left hemithorax. 2. Follow-up is recommended with specific attention to obtaining a upright PA and lateral chest          IP CONSULT TO CRITICAL CARE  IP CONSULT TO HOSPITALIST  IP CONSULT TO PHARMACY  IP CONSULT TO PALLIATIVE CARE  IP CONSULT TO GENERAL SURGERY  IP CONSULT TO PODIATRY  IP CONSULT TO GI  IP CONSULT TO CARDIOLOGY    Assessment/Plan:    Active Hospital Problems    Diagnosis     Decubitus ulcer of sacral region, stage 4 ([address] [L89.154]      Priority: Medium    Delirium [R41.0]      Priority: Medium    Septic shock ([address] [A41.9, R65.21]      Priority: Medium    Sepsis ([address] [A41.9]      Priority: Medium     Afib with RVR: possibly related to recent PE? Cardizem bolus and started drip at 5mg. Still running high, will increase drip. Cardiology consulted for further recs on management. ALISON on CKD stage 3:-Baseline of 1.3 creatinine, was treated with IV fluids for septic shock  Acute metabolic encephalopathy:-Likely due to septic shock, acute liver failure.   At home patient is on Seroquel Klonopin and doxepin  -Mental status is waxing and waning  -Ammonia is elevated, etiology unclear at present, GI is consulted, pending recs  Follow-up and anti SM, anti BENJAMÍN antibodies  -CT A/P from 12/2022 shows \"normal liver\" but overall picture suggests chronic liver disease, possible cirrhosis  -US RUQ to eval hepatobiliary system, suspect possible cirrhosis, pending     2. Severe sepsis, pro calcitonin 43 on admission, due to UTI, he has sacral decubitus stage IV ulcer, diabetic foot ulcers  Currently on IV antibiotics  Cultures reviewed    3. Complicated UTI likely sec to urinary retention, recent o/p catheterization 1/4/2023. Prob acute on chronic urinary retention    4. Generalized anasarca/pericardial effusion/ low albumin/ malnutrion:-History of large free-flowing pericardial effusion and prominent epicardial fat with LVEF 60 to 60% from December at [facility].  -Started on albumin 3 times daily and Lasix  -dietician consult   -Repeat echo: Moderate circumferential pericardial effusion, no tamponade    5. Recent Bilateral PE 12/2022 hx on eliquis, here on heparin drip     6. Acute on chronic anemia:-We will hold heparin drip, can resume Eliquis if hemoglobin is stable    8. Chronic pain, narcotic dependent  9. Chronic anxiety, benzo dependent  10. Morbid obesity s/p gastric bypass       PTOT consult      DVT Prophylaxis: eliquis  Diet: ADULT ORAL NUTRITION SUPPLEMENT; Breakfast, Dinner; Wound Healing Oral Supplement  ADULT DIET; Regular  Code Status: Full Code  PT/OT Eval Status: n/a     Dispo - inpatient , pending improvement, PT OT ordered, getting unit of blood, will likely need placement.        Summer Woo MD

## 2023-01-31 NOTE — CONSULTS
[address]         Reason for Consultation/Chief Complaint: \"I have been having AF with RVR. \"       History of Present Illness:  Leonila Sanchez is a 58 y.o. patient who presented to the hospital with complaints of sepsis and anemia. .  The patient has no chest pain and has severe anemia with sepsis and significant sacral decubitus ulcer. Pt has AF with RVR and has had this for years by his report. He wishes to go home. Past Medical History:   has a past medical history of Bradycardia, Depression, Diabetes mellitus ([address] Hyperlipidemia, Hypertension, Osteoarthritis of lumbar spine, Primary osteoarthritis of lumbar spine, Unspecified cerebral artery occlusion with cerebral infarction, and Unspecified cerebral artery occlusion with cerebral infarction. Surgical History:   has a past surgical history that includes back surgery; joint replacement; Gastric bypass surgery; Tonsillectomy; Upper gastrointestinal endoscopy (N/A, 8/9/2019); and Colonoscopy (N/A, 8/9/2019). Social History:   reports that he has never smoked. He does not have any smokeless tobacco history on file. He reports that he does not drink alcohol and does not use drugs. Family History:  No evidence for sudden cardiac death or premature CAD    Home Medications:  Were reviewed and are listed in nursing record. and/or listed below  Prior to Admission medications    Medication Sig Start Date End Date Taking? Authorizing Provider   apixaban (ELIQUIS) 5 MG TABS tablet Take 5 mg by mouth 2 times daily   Yes Historical Provider, MD   clonazePAM (KLONOPIN) 1 MG tablet Take 1 mg by mouth 2 times daily as needed.    Yes Historical Provider, MD   doxepin (SINEQUAN) 50 MG capsule Take 150 mg by mouth nightly   Yes Historical Provider, MD   furosemide (LASIX) 20 MG tablet Take 20 mg by mouth daily 1/4/23  Yes Historical Provider, MD   latanoprost (XALATAN) 0.005 % ophthalmic solution Place 1 drop into both eyes nightly  Patient not taking: Reported on 1/26/2023 11/6/22  Yes Historical Provider, MD   pantoprazole (PROTONIX) 40 MG tablet Take 40 mg by mouth daily   Yes Historical Provider, MD   tiZANidine (ZANAFLEX) 4 MG tablet Take 4 mg by mouth every 8 hours as needed (muscle spasms)   Yes Historical Provider, MD   tamsulosin (FLOMAX) 0.4 MG capsule Take 0.4 mg by mouth 2 times daily    Historical Provider, MD   losartan (COZAAR) 50 MG tablet Take 50 mg by mouth daily. Historical Provider, MD   senna (SENOKOT) 8.6 MG tablet Take 1 tablet by mouth as needed for Constipation. Patient not taking: Reported on 1/26/2023    Historical Provider, MD   docusate sodium (COLACE) 100 MG capsule Take 100 mg by mouth. Patient not taking: Reported on 1/26/2023    Historical Provider, MD   gabapentin (NEURONTIN) 800 MG tablet Take 800 mg by mouth 3 times daily. Historical Provider, MD   oxyCODONE-acetaminophen (PERCOCET) 7.5-325 MG per tablet Take 1 tablet by mouth every 12 hours as needed for Pain. Historical Provider, MD   metoprolol succinate (TOPROL XL) 50 MG extended release tablet Take 50 mg by mouth daily    Historical Provider, MD   pravastatin (PRAVACHOL) 10 MG tablet Take 10 mg by mouth daily. Historical Provider, MD   folic acid (FOLVITE) 1 MG tablet Take 1 mg by mouth daily. Historical Provider, MD   montelukast (SINGULAIR) 10 MG tablet Take 10 mg by mouth nightly. Historical Provider, MD   loratadine (CLARITIN) 10 MG tablet Take 10 mg by mouth daily. Historical Provider, MD   therapeutic multivitamin-minerals (THERAGRAN-M) tablet Take 1 tablet by mouth daily.       Historical Provider, MD        Hospital Medications:   potassium chloride  20 mEq Oral BID WC    digoxin  250 mcg IntraVENous Daily    collagenase   Topical Daily    metoprolol succinate  25 mg Oral Daily    albumin human  25 g IntraVENous Q8H    furosemide  20 mg IntraVENous Daily    doxepin  150 mg Oral Nightly    cefTRIAXone (ROCEPHIN) IV  2,000 mg IntraVENous Q24H    thiamine  100 mg IntraVENous Daily    melatonin  3 mg Oral Nightly    Venelex   Topical BID    lactulose  10 g Oral TID    collagenase   Topical Daily    silver nitrate applicators   Topical Once    sodium chloride flush  5-40 mL IntraVENous 2 times per day    famotidine (PEPCID) injection  20 mg IntraVENous Daily     LORazepam, sodium chloride, prochlorperazine **OR** prochlorperazine, heparin (porcine), heparin (porcine), clonazePAM, perflutren lipid microspheres, sodium chloride flush, sodium chloride, polyethylene glycol, acetaminophen **OR** acetaminophen   dilTIAZem 10 mg/hr (01/31/23 1606)    sodium chloride      [Held by provider] heparin (PORCINE) Infusion Stopped (01/30/23 1658)    sodium chloride 10 mL/hr at 01/31/23 1047       Allergies:  Patient has no known allergies. Review of Systems:     Unable to perform. Pt with confusion. Physical Examination:    Vitals:    01/31/23 1717   BP: (!) 131/93   Pulse: (!) 125   Resp: 20   Temp: 99 °F (37.2 °C)   SpO2: 96%    Weight: 184 lb 1.4 oz (83.5 kg)         General Appearance:  Alert, cooperative, no distress, appears stated age   Head:  Normocephalic, without obvious abnormality, atraumatic   Eyes:  PERRL   Nose: Nares normal,   Neck: Supple, JVP normal    Lungs:   Clear to auscultation bilaterally, respirations unlabored   Chest Wall:  No tenderness or deformity   Heart:  Irregularly irregular rate and rhythm, normal S1, S2 normal, no murmur, ,  No rub.  No S3  gallop   Abdomen:   Soft, non-tender, +bowel sounds   Extremities: no cyanosis, no clubbing , No edema   Pulses: Symmetric extremities   Skin: Sacral decubitus ulcer   Pysch: tangential   Neurologic: CN II - XII grossly intact        Labs  CBC:   Lab Results   Component Value Date/Time    WBC 9.0 01/31/2023 04:18 AM    RBC 2.08 01/31/2023 04:18 AM    HGB 7.1 01/31/2023 04:18 AM    HCT 21.3 01/31/2023 04:18 AM    .5 01/31/2023 04:18 AM    RDW 15.7 01/31/2023 04:18 AM     01/31/2023 04:18 AM     CMP:    Lab Results   Component Value Date/Time     01/31/2023 04:18 AM    K 3.2 01/31/2023 04:18 AM     01/31/2023 04:18 AM    CO2 27 01/31/2023 04:18 AM    BUN 11 01/31/2023 04:18 AM    CREATININE 0.7 01/31/2023 04:18 AM    GFRAA >60 06/29/2020 12:30 PM    AGRATIO 2.1 06/29/2020 12:30 PM    LABGLOM >60 01/31/2023 04:18 AM    GLUCOSE 79 01/31/2023 04:18 AM    PROT 4.8 01/31/2023 04:18 AM    CALCIUM 7.9 01/31/2023 04:18 AM    BILITOT 0.8 01/31/2023 04:18 AM    ALKPHOS 179 01/31/2023 04:18 AM    AST 38 01/31/2023 04:18 AM    ALT 34 01/31/2023 04:18 AM     PT/INR:  No results found for: PTINR  No results for input(s): CKTOTAL, CKMB, TROPONINI in the last 72 hours. EKG:  AF with RVR RBBB. Assessment  Patient Active Problem List   Diagnosis    Sepsis ([facility]    Delirium    Septic shock ([facility]    Decubitus ulcer of sacral region, stage 4 ([facility])       Impression:  Sepsis. AF with RVR. Delirium. Severe anemia. Recommendations:    I had the opportunity to review the clinical symptoms and presentation of Liza Pereira. I recommend that the patient undergo further cardiac treatment with diltiazem gtt and digoxin . Continue heparin IV. HR may be difficult to control with severe anemia. Thank you for allowing to us to participate in the care or Liza Pereira. All questions and concerns were addressed to the patient/family. Alternatives to my treatment were discussed. The note was completed using EMR. Every effort was made to ensure accuracy; however, inadvertent computerized transcription errors may be present.        Cornelius Sanches MD [facility] [place]

## 2023-01-31 NOTE — PLAN OF CARE
Patient from sinus/sinus tachy to Afib at around 6AM. EKD done. Hospitalist made aware, ordered metorpolol 5mg IV and was given. Problem: Discharge Planning  Goal: Discharge to home or other facility with appropriate resources  Outcome: Progressing  Flowsheets  Taken 1/31/2023 0057 by Tim Toney RN  Discharge to home or other facility with appropriate resources: Identify barriers to discharge with patient and caregiver    Problem: Pain  Goal: Verbalizes/displays adequate comfort level or baseline comfort level  Outcome: Progressing  Flowsheets  Taken 1/31/2023 0057 by Tim Toney RN  Verbalizes/displays adequate comfort level or baseline comfort level:   Encourage patient to monitor pain and request assistance   Assess pain using appropriate pain scale   Administer analgesics based on type and severity of pain and evaluate response   Implement non-pharmacological measures as appropriate and evaluate response    Problem: Safety - Adult  Goal: Free from fall injury  Outcome: Progressing  Flowsheets (Taken 1/31/2023 0057)  Free From Fall Injury:   Instruct family/caregiver on patient safety   Based on caregiver fall risk screen, instruct family/caregiver to ask for assistance with transferring infant if caregiver noted to have fall risk factors  Note: Fall prevention protocol in place. Call light kept within reach. Calls/needs attended. Room clutters removed. Bed wheels locked and kept in lowest level. Problem: Skin/Tissue Integrity  Goal: Absence of new skin breakdown  Description: 1. Monitor for areas of redness and/or skin breakdown  2. Assess vascular access sites hourly  3. Every 4-6 hours minimum:  Change oxygen saturation probe site  4. Every 4-6 hours:  If on nasal continuous positive airway pressure, respiratory therapy assess nares and determine need for appliance change or resting period. Outcome: Progressing  Note: Skin assessed and documented findings.  Sacral wound dressing done. Skin breakdown preventive measures applied. Repositioned and turned to sides, uses pillows as support. Problem: Chronic Conditions and Co-morbidities  Goal: Patient's chronic conditions and co-morbidity symptoms are monitored and maintained or improved  Outcome: Progressing  Flowsheets  Taken 1/31/2023 0057 by Anita Lynch, RN  Care Plan - Patient's Chronic Conditions and Co-Morbidity Symptoms are Monitored and Maintained or Improved:   Monitor and assess patient's chronic conditions and comorbid symptoms for stability, deterioration, or improvement   Collaborate with multidisciplinary team to address chronic and comorbid conditions and prevent exacerbation or deterioration   Update acute care plan with appropriate goals if chronic or comorbid symptoms are exacerbated and prevent overall improvement and discharge  Note: No anticoag on MAR. Informed Hospitalist on duty. Waiting for orders. Problem: ABCDS Injury Assessment  Goal: Absence of physical injury  Outcome: Progressing  Flowsheets (Taken 1/31/2023 0057)  Absence of Physical Injury: Implement safety measures based on patient assessment     Problem: Hematologic - Adult  Goal: Maintains hematologic stability  Outcome: Progressing  Flowsheets (Taken 1/31/2023 0057)  Maintains hematologic stability:   Assess for signs and symptoms of bleeding or hemorrhage   Administer blood products/factors as ordered   Monitor labs for bleeding or clotting disorders  Note: No signs and symptoms of bleeding noted.  Will be repeating labs in AM

## 2023-02-01 ENCOUNTER — APPOINTMENT (OUTPATIENT)
Dept: CT IMAGING | Age: 63
DRG: 720 | End: 2023-02-01
Payer: MEDICAID

## 2023-02-01 PROBLEM — E78.2 MIXED HYPERLIPIDEMIA: Status: ACTIVE | Noted: 2023-02-01

## 2023-02-01 PROBLEM — I48.91 ATRIAL FIBRILLATION WITH RAPID VENTRICULAR RESPONSE (HCC): Status: ACTIVE | Noted: 2023-02-01

## 2023-02-01 PROBLEM — I10 PRIMARY HYPERTENSION: Status: ACTIVE | Noted: 2023-02-01

## 2023-02-01 PROBLEM — I49.3 PVC (PREMATURE VENTRICULAR CONTRACTION): Status: ACTIVE | Noted: 2023-02-01

## 2023-02-01 PROBLEM — I49.1 PAC (PREMATURE ATRIAL CONTRACTION): Status: ACTIVE | Noted: 2023-02-01

## 2023-02-01 LAB
ALBUMIN SERPL-MCNC: 3.3 G/DL (ref 3.4–5)
ANION GAP SERPL CALCULATED.3IONS-SCNC: 9 MMOL/L (ref 3–16)
ANTI-XA UNFRAC HEPARIN: <0.1 IU/ML (ref 0.3–0.7)
BASOPHILS ABSOLUTE: 0 K/UL (ref 0–0.2)
BASOPHILS RELATIVE PERCENT: 0 %
BLOOD BANK DISPENSE STATUS: NORMAL
BLOOD BANK PRODUCT CODE: NORMAL
BPU ID: NORMAL
BUN BLDV-MCNC: 9 MG/DL (ref 7–20)
CALCIUM SERPL-MCNC: 8.1 MG/DL (ref 8.3–10.6)
CHLORIDE BLD-SCNC: 110 MMOL/L (ref 99–110)
CO2: 26 MMOL/L (ref 21–32)
CREAT SERPL-MCNC: 0.6 MG/DL (ref 0.8–1.3)
CREATININE URINE: 13.3 MG/DL (ref 39–259)
DESCRIPTION BLOOD BANK: NORMAL
EOSINOPHILS ABSOLUTE: 0 K/UL (ref 0–0.6)
EOSINOPHILS RELATIVE PERCENT: 0.1 %
FOLATE: 13.88 NG/ML (ref 4.78–24.2)
GFR SERPL CREATININE-BSD FRML MDRD: >60 ML/MIN/{1.73_M2}
GLUCOSE BLD-MCNC: 79 MG/DL (ref 70–99)
HAPTOGLOBIN: 173 MG/DL (ref 30–200)
HCT VFR BLD CALC: 19.9 % (ref 40.5–52.5)
HCT VFR BLD CALC: 24.5 % (ref 40.5–52.5)
HEMOGLOBIN: 6.7 G/DL (ref 13.5–17.5)
HEMOGLOBIN: 8.2 G/DL (ref 13.5–17.5)
IMMATURE RETIC FRACT: 0.47 (ref 0.21–0.37)
LACTATE DEHYDROGENASE: 288 U/L (ref 100–190)
LYMPHOCYTES ABSOLUTE: 0.8 K/UL (ref 1–5.1)
LYMPHOCYTES RELATIVE PERCENT: 11.2 %
MAGNESIUM: 1.7 MG/DL (ref 1.8–2.4)
MCH RBC QN AUTO: 34.9 PG (ref 26–34)
MCHC RBC AUTO-ENTMCNC: 33.9 G/DL (ref 31–36)
MCV RBC AUTO: 103 FL (ref 80–100)
MONOCYTES ABSOLUTE: 0.9 K/UL (ref 0–1.3)
MONOCYTES RELATIVE PERCENT: 11.9 %
NEUTROPHILS ABSOLUTE: 5.5 K/UL (ref 1.7–7.7)
NEUTROPHILS RELATIVE PERCENT: 76.8 %
PDW BLD-RTO: 15.9 % (ref 12.4–15.4)
PHOSPHORUS: 3.1 MG/DL (ref 2.5–4.9)
PLATELET # BLD: 176 K/UL (ref 135–450)
PMV BLD AUTO: 7.7 FL (ref 5–10.5)
POTASSIUM SERPL-SCNC: 3.4 MMOL/L (ref 3.5–5.1)
PROCALCITONIN: 0.87 NG/ML (ref 0–0.15)
PROTEIN PROTEIN: 14 MG/DL
PROTEIN/CREAT RATIO: 1.1 MG/DL
RBC # BLD: 1.93 M/UL (ref 4.2–5.9)
RETICULOCYTE ABSOLUTE COUNT: 0.16 M/UL
RETICULOCYTE COUNT PCT: 6.49 % (ref 0.5–2.18)
SODIUM BLD-SCNC: 145 MMOL/L (ref 136–145)
VITAMIN B-12: 963 PG/ML (ref 211–911)
WBC # BLD: 7.2 K/UL (ref 4–11)

## 2023-02-01 PROCEDURE — 82570 ASSAY OF URINE CREATININE: CPT

## 2023-02-01 PROCEDURE — 97530 THERAPEUTIC ACTIVITIES: CPT

## 2023-02-01 PROCEDURE — 6370000000 HC RX 637 (ALT 250 FOR IP): Performed by: INTERNAL MEDICINE

## 2023-02-01 PROCEDURE — 83735 ASSAY OF MAGNESIUM: CPT

## 2023-02-01 PROCEDURE — 6360000004 HC RX CONTRAST MEDICATION: Performed by: INTERNAL MEDICINE

## 2023-02-01 PROCEDURE — 6370000000 HC RX 637 (ALT 250 FOR IP): Performed by: NURSE PRACTITIONER

## 2023-02-01 PROCEDURE — 74177 CT ABD & PELVIS W/CONTRAST: CPT

## 2023-02-01 PROCEDURE — 85014 HEMATOCRIT: CPT

## 2023-02-01 PROCEDURE — 6370000000 HC RX 637 (ALT 250 FOR IP): Performed by: STUDENT IN AN ORGANIZED HEALTH CARE EDUCATION/TRAINING PROGRAM

## 2023-02-01 PROCEDURE — 2580000003 HC RX 258: Performed by: INTERNAL MEDICINE

## 2023-02-01 PROCEDURE — 2060000000 HC ICU INTERMEDIATE R&B

## 2023-02-01 PROCEDURE — A4216 STERILE WATER/SALINE, 10 ML: HCPCS | Performed by: STUDENT IN AN ORGANIZED HEALTH CARE EDUCATION/TRAINING PROGRAM

## 2023-02-01 PROCEDURE — 85520 HEPARIN ASSAY: CPT

## 2023-02-01 PROCEDURE — 85045 AUTOMATED RETICULOCYTE COUNT: CPT

## 2023-02-01 PROCEDURE — 84156 ASSAY OF PROTEIN URINE: CPT

## 2023-02-01 PROCEDURE — 2500000003 HC RX 250 WO HCPCS: Performed by: INTERNAL MEDICINE

## 2023-02-01 PROCEDURE — 85018 HEMOGLOBIN: CPT

## 2023-02-01 PROCEDURE — 6360000002 HC RX W HCPCS: Performed by: INTERNAL MEDICINE

## 2023-02-01 PROCEDURE — 83615 LACTATE (LD) (LDH) ENZYME: CPT

## 2023-02-01 PROCEDURE — 36592 COLLECT BLOOD FROM PICC: CPT

## 2023-02-01 PROCEDURE — 2580000003 HC RX 258: Performed by: STUDENT IN AN ORGANIZED HEALTH CARE EDUCATION/TRAINING PROGRAM

## 2023-02-01 PROCEDURE — 84145 PROCALCITONIN (PCT): CPT

## 2023-02-01 PROCEDURE — 2500000003 HC RX 250 WO HCPCS: Performed by: STUDENT IN AN ORGANIZED HEALTH CARE EDUCATION/TRAINING PROGRAM

## 2023-02-01 PROCEDURE — 83010 ASSAY OF HAPTOGLOBIN QUANT: CPT

## 2023-02-01 PROCEDURE — 85025 COMPLETE CBC W/AUTO DIFF WBC: CPT

## 2023-02-01 PROCEDURE — 80069 RENAL FUNCTION PANEL: CPT

## 2023-02-01 PROCEDURE — 97166 OT EVAL MOD COMPLEX 45 MIN: CPT

## 2023-02-01 PROCEDURE — 93005 ELECTROCARDIOGRAM TRACING: CPT | Performed by: NURSE PRACTITIONER

## 2023-02-01 PROCEDURE — 6360000002 HC RX W HCPCS: Performed by: STUDENT IN AN ORGANIZED HEALTH CARE EDUCATION/TRAINING PROGRAM

## 2023-02-01 PROCEDURE — 82746 ASSAY OF FOLIC ACID SERUM: CPT

## 2023-02-01 PROCEDURE — 82607 VITAMIN B-12: CPT

## 2023-02-01 PROCEDURE — P9047 ALBUMIN (HUMAN), 25%, 50ML: HCPCS | Performed by: INTERNAL MEDICINE

## 2023-02-01 PROCEDURE — 97162 PT EVAL MOD COMPLEX 30 MIN: CPT

## 2023-02-01 PROCEDURE — 99233 SBSQ HOSP IP/OBS HIGH 50: CPT | Performed by: NURSE PRACTITIONER

## 2023-02-01 PROCEDURE — 36430 TRANSFUSION BLD/BLD COMPNT: CPT

## 2023-02-01 PROCEDURE — 97535 SELF CARE MNGMENT TRAINING: CPT

## 2023-02-01 RX ORDER — FUROSEMIDE 20 MG/1
20 TABLET ORAL DAILY
Status: DISCONTINUED | OUTPATIENT
Start: 2023-02-02 | End: 2023-02-04 | Stop reason: HOSPADM

## 2023-02-01 RX ORDER — SODIUM CHLORIDE 9 MG/ML
INJECTION, SOLUTION INTRAVENOUS PRN
Status: DISCONTINUED | OUTPATIENT
Start: 2023-02-01 | End: 2023-02-01

## 2023-02-01 RX ORDER — DILTIAZEM HYDROCHLORIDE 240 MG/1
240 CAPSULE, COATED, EXTENDED RELEASE ORAL DAILY
Status: DISCONTINUED | OUTPATIENT
Start: 2023-02-01 | End: 2023-02-04 | Stop reason: HOSPADM

## 2023-02-01 RX ADMIN — ACETAMINOPHEN 650 MG: 325 TABLET, FILM COATED ORAL at 11:30

## 2023-02-01 RX ADMIN — SODIUM CHLORIDE 5 ML/HR: 9 INJECTION, SOLUTION INTRAVENOUS at 22:56

## 2023-02-01 RX ADMIN — SODIUM CHLORIDE, PRESERVATIVE FREE 10 ML: 5 INJECTION INTRAVENOUS at 21:02

## 2023-02-01 RX ADMIN — Medication 3 MG: at 20:13

## 2023-02-01 RX ADMIN — HEPARIN SODIUM 16 UNITS/KG/HR: 10000 INJECTION, SOLUTION INTRAVENOUS at 15:17

## 2023-02-01 RX ADMIN — DILTIAZEM HYDROCHLORIDE 10 MG/HR: 5 INJECTION, SOLUTION INTRAVENOUS at 00:38

## 2023-02-01 RX ADMIN — POTASSIUM CHLORIDE 20 MEQ: 1500 TABLET, EXTENDED RELEASE ORAL at 09:45

## 2023-02-01 RX ADMIN — Medication: at 08:22

## 2023-02-01 RX ADMIN — CEFEPIME HYDROCHLORIDE 2000 MG: 2 INJECTION, POWDER, FOR SOLUTION INTRAMUSCULAR; INTRAVENOUS at 11:44

## 2023-02-01 RX ADMIN — ACETAMINOPHEN 650 MG: 325 TABLET, FILM COATED ORAL at 03:39

## 2023-02-01 RX ADMIN — LACTULOSE 10 G: 10 SOLUTION ORAL at 15:11

## 2023-02-01 RX ADMIN — IOPAMIDOL 75 ML: 755 INJECTION, SOLUTION INTRAVENOUS at 10:36

## 2023-02-01 RX ADMIN — COLLAGENASE SANTYL: 250 OINTMENT TOPICAL at 16:16

## 2023-02-01 RX ADMIN — LACTULOSE 10 G: 10 SOLUTION ORAL at 09:46

## 2023-02-01 RX ADMIN — ALBUMIN (HUMAN) 25 G: 0.25 INJECTION, SOLUTION INTRAVENOUS at 11:46

## 2023-02-01 RX ADMIN — CLONAZEPAM 1 MG: 1 TABLET ORAL at 09:46

## 2023-02-01 RX ADMIN — DOXEPIN HYDROCHLORIDE 150 MG: 25 CAPSULE ORAL at 22:02

## 2023-02-01 RX ADMIN — LACTULOSE 10 G: 10 SOLUTION ORAL at 20:13

## 2023-02-01 RX ADMIN — ALBUMIN (HUMAN) 25 G: 0.25 INJECTION, SOLUTION INTRAVENOUS at 03:41

## 2023-02-01 RX ADMIN — FUROSEMIDE 20 MG: 10 INJECTION, SOLUTION INTRAMUSCULAR; INTRAVENOUS at 09:56

## 2023-02-01 RX ADMIN — CEFEPIME HYDROCHLORIDE 2000 MG: 2 INJECTION, POWDER, FOR SOLUTION INTRAMUSCULAR; INTRAVENOUS at 22:57

## 2023-02-01 RX ADMIN — THIAMINE HYDROCHLORIDE 100 MG: 100 INJECTION, SOLUTION INTRAMUSCULAR; INTRAVENOUS at 09:55

## 2023-02-01 RX ADMIN — METOPROLOL SUCCINATE 25 MG: 25 TABLET, EXTENDED RELEASE ORAL at 09:45

## 2023-02-01 RX ADMIN — ALBUMIN (HUMAN) 25 G: 0.25 INJECTION, SOLUTION INTRAVENOUS at 20:13

## 2023-02-01 RX ADMIN — SODIUM CHLORIDE, PRESERVATIVE FREE 10 ML: 5 INJECTION INTRAVENOUS at 10:05

## 2023-02-01 RX ADMIN — DILTIAZEM HYDROCHLORIDE 240 MG: 240 CAPSULE, COATED, EXTENDED RELEASE ORAL at 15:12

## 2023-02-01 RX ADMIN — CLONAZEPAM 1 MG: 1 TABLET ORAL at 18:07

## 2023-02-01 RX ADMIN — Medication: at 21:02

## 2023-02-01 RX ADMIN — SODIUM CHLORIDE, PRESERVATIVE FREE 20 MG: 5 INJECTION INTRAVENOUS at 09:58

## 2023-02-01 ASSESSMENT — PAIN SCALES - GENERAL
PAINLEVEL_OUTOF10: 3
PAINLEVEL_OUTOF10: 0

## 2023-02-01 ASSESSMENT — PAIN DESCRIPTION - LOCATION: LOCATION: BACK

## 2023-02-01 ASSESSMENT — PAIN - FUNCTIONAL ASSESSMENT: PAIN_FUNCTIONAL_ASSESSMENT: ACTIVITIES ARE NOT PREVENTED

## 2023-02-01 ASSESSMENT — PAIN DESCRIPTION - ORIENTATION: ORIENTATION: MID

## 2023-02-01 ASSESSMENT — PAIN DESCRIPTION - ONSET: ONSET: ON-GOING

## 2023-02-01 ASSESSMENT — PAIN DESCRIPTION - DESCRIPTORS: DESCRIPTORS: ACHING

## 2023-02-01 ASSESSMENT — PAIN DESCRIPTION - PAIN TYPE: TYPE: CHRONIC PAIN

## 2023-02-01 ASSESSMENT — PAIN DESCRIPTION - FREQUENCY: FREQUENCY: CONTINUOUS

## 2023-02-01 NOTE — PROGRESS NOTES
Physical Therapy/Occupational Therapy  Hold    PT/OT reviewed chart, RN consulted. Hg 6.7 this AM, RN stating pt will be receiving blood this morning. PT/OT will hold for now and check back later in the day as able.       Chinedu Farrell PT, DPT, NCS, CSRS   Babita Loomis  MS, OTR/L #[id number]

## 2023-02-01 NOTE — PROGRESS NOTES
CC: AF RVR   HPI:     S: Yelling out for staff this morning. No cp or sob     Tele: AF RVR overnight. Sinus PVC, PAC this morning     O:  Physical Exam:  /84   Pulse 97   Temp 98.4 °F (36.9 °C) (Axillary)   Resp 20   Ht 6' (1.829 m)   Wt 172 lb 9.9 oz (78.3 kg)   SpO2 97%   BMI 23.41 kg/m²    General (appearance):  agitated and calling out  Eyes: anicteric   Neck: soft, No JVD  Ears/Nose/Mouth/Thorat: No cyanosis  CV: Reg, frequent ectopy   Respiratory:  Diminished, normal effort  GI: soft, non-tender, non-distended  Skin: Warm, dry. No rashes. ACE wraps and shyam boots to both feet   Neuro/Psych: Alert   Ext:  No c/c. Pulses:  2+ radial     I.O's=     Weight  Admission: Weight: 175 lb 11.3 oz (79.7 kg)   Today: Weight: 172 lb 9.9 oz (78.3 kg)    CBC:   Recent Labs     23  0601 23  1215 23  0418 23  0522   WBC 7.4  --  9.0 7.2   HGB 5.7* 7.4* 7.1* 6.7*   HCT 17.5* 22.0* 21.3* 19.9*   .6*  --  102.5* 103.0*     --  183 176     BMP:   Recent Labs     23  0601 23  0418 23  05     146* 145 145   K 3.4*  3.4* 3.2* 3.4*   *  110 110 110   CO2 23  26 27 26   PHOS 3.2  3.0  --  3.1   BUN 15  15 11 9   CREATININE 0.7*  0.7* 0.7* 0.6*     Estimated Creatinine Clearance: 140 mL/min (A) (based on SCr of 0.6 mg/dL (L)). Mag:   Lab Results   Component Value Date/Time    MG 1.70 2023 05:22 AM     LIVER PROFILE:   Recent Labs     23   AST 38*   ALT 34   BILIDIR 0.4*   BILITOT 0.8   ALKPHOS 179*     PT/INR: No results for input(s): PROTIME, INR in the last 72 hours. Pro-BNP:   Lab Results   Component Value Date/Time    PROBNP 520 2023 04:19 PM     CARDIAC ENZYMES: No results for input(s): CKTOTAL, CKMB, TROPONINI in the last 72 hours. Imagin2023 Echo   Technically difficult examination. Ejection fraction is visually estimated to be 60-65 %. Normal right ventricular size and function.    Moderate circumferential pericardial effusion with prominent pericardial fat. No tamponade physiology.     Assessment:  Delirium   AF RVR  PAC/PVC  Severe anemia  Decubitus ulcer  DM  HLD  HTN  Hx CVA  Sepsis    Plan:  -Keep K>4, Mg>2.  -Toprol 25 mg po bid   -IV lasix switched to PO  -Diltiazem gtt switched to diltiazem  mg po daily  -No A/C d/t Severe anemia

## 2023-02-01 NOTE — PLAN OF CARE
Problem: Safety - Medical Restraint  Goal: Remains free of injury from restraints (Restraint for Interference with Medical Device)  Description: INTERVENTIONS:  1. Determine that other, less restrictive measures have been tried or would not be effective before applying the restraint  2. Evaluate the patient's condition at the time of restraint application  3. Inform patient/family regarding the reason for restraint  4. Q2H: Monitor safety, psychosocial status, comfort, nutrition and hydration  2/1/2023 0101 by Zahida Valdivia RN  Outcome: Progressing  Flowsheets (Taken 2/1/2023 0101)  Remains free of injury from restraints (restraint for interference with medical device):   Determine that other, less restrictive measures have been tried or would not be effective before applying the restraint   Evaluate the patient's condition at the time of restraint application   Inform patient/family regarding the reason for restraint   Every 2 hours: Monitor safety, psychosocial status, comfort, nutrition and hydration  Note: On soft limbs nonviolent wrist restraints. Repositioning and reevaulation done every 2hrs. Problem: Safety - Adult  Goal: Free from fall injury  2/1/2023 0101 by Zahida Valdivia RN  Outcome: Progressing  Flowsheets (Taken 2/1/2023 0101)  Free From Fall Injury:   Instruct family/caregiver on patient safety   Based on caregiver fall risk screen, instruct family/caregiver to ask for assistance with transferring infant if caregiver noted to have fall risk factors  Note: Fall prevention protocol in place. Bed alarm on. Avasys camera for safety. Call light kept within reach. Calls/needs attended     Problem: Skin/Tissue Integrity  Goal: Absence of new skin breakdown  Description: 1. Monitor for areas of redness and/or skin breakdown  2. Assess vascular access sites hourly  3. Every 4-6 hours minimum:  Change oxygen saturation probe site  4.   Every 4-6 hours:  If on nasal continuous positive airway pressure, respiratory therapy assess nares and determine need for appliance change or resting period. 2/1/2023 0101 by Mortimer Cork, RN  Outcome: Progressing  Note: Skin breakdown preventive measures applied. On specialty bed mattress. Kept skin dry and changed soiled diapers. Problem: ABCDS Injury Assessment  Goal: Absence of physical injury  Outcome: Progressing  Flowsheets (Taken 2/1/2023 0101)  Absence of Physical Injury: Implement safety measures based on patient assessment  Note: Patient on soft limb restraints, repositioning of restraints done. On avasys camera for safety     Problem: Neurosensory - Adult  Goal: Achieves stable or improved neurological status  Outcome: Progressing  Flowsheets (Taken 2/1/2023 0101)  Achieves stable or improved neurological status:   Assess for and report changes in neurological status   Initiate measures to prevent increased intracranial pressure   Maintain blood pressure and fluid volume within ordered parameters to optimize cerebral perfusion and minimize risk of hemorrhage   Monitor temperature, glucose, and sodium. Initiate appropriate interventions as ordered  Note: Patient points at the garbage bins and tells that there's a dog chihuSwissmed Mobileua playing. Problem: Cardiovascular - Adult  Goal: Maintains optimal cardiac output and hemodynamic stability  Outcome: Progressing  Flowsheets (Taken 2/1/2023 0101)  Maintains optimal cardiac output and hemodynamic stability:   Monitor blood pressure and heart rate   Assess for signs of decreased cardiac output  Note: Sinus tachycardia, with regular to irregular rhythms, on continuous diltiazem drip. Problem: Genitourinary - Adult  Goal: Absence of urinary retention  Outcome: Progressing  Flowsheets (Taken 2/1/2023 0101)  Absence of urinary retention:   Assess patients ability to void and empty bladder   Monitor intake/output and perform bladder scan as needed  Note: On nichols catheter.

## 2023-02-01 NOTE — PROGRESS NOTES
Occupational Therapy  Facility/Department: [place] [address] [facility]  Occupational Therapy Initial Assessment and Treatment Note     Name: Eleuterio Bejarano  : 1960  MRN: [id number]  Date of Service: 2023    Discharge Recommendations:Kendell Flannery scored a  on the AM-PAC ADL Inpatient form. Current research shows that an AM-PAC score of 17 or less is typically not associated with a discharge to the patient's home setting. Based on the patient's AM-PAC score and their current ADL deficits, it is recommended that the patient have 3-5 sessions per week of Occupational Therapy at d/c to increase the patient's independence. Please see assessment section for further patient specific details. If patient discharges prior to next session this note will serve as a discharge summary. Please see below for the latest assessment towards goals. OT Equipment Recommendations  Equipment Needed: No  Other: defer to next [facility] facility       Patient Diagnosis(es): The primary encounter diagnosis was Delirium. Diagnoses of Sepsis with encephalopathy without septic shock, due to unspecified organism ([address] and Urinary tract infection without hematuria, site unspecified were also pertinent to this visit. Past Medical History:  has a past medical history of Bradycardia, Depression, Diabetes mellitus ([address] Hyperlipidemia, Hypertension, Osteoarthritis of lumbar spine, Primary osteoarthritis of lumbar spine, Unspecified cerebral artery occlusion with cerebral infarction, and Unspecified cerebral artery occlusion with cerebral infarction. Past Surgical History:  has a past surgical history that includes back surgery; joint replacement; Gastric bypass surgery; Tonsillectomy; Upper gastrointestinal endoscopy (N/A, 2019); and Colonoscopy (N/A, 2019). Treatment Diagnosis: impaired ADLs/ functional transfers/ decreased endurance      Assessment   Performance deficits / Impairments: Decreased functional mobility ; Decreased endurance;Decreased ADL status; Decreased safe awareness  Assessment: Pt from home - recently dx with Bilateral PE's in 12/2022. Pt demo functioning well below baseline level. Limited endurance and generalized deconditioning. Pt requires assist x 2 for functional transfers / Max A with ADLs  Pt would benefit from ongoing inpt OT at d/c to maximize functional level. If alertness / activity tolerance improve may benefit from ARU. Will follow as inpt. Treatment Diagnosis: impaired ADLs/ functional transfers/ decreased endurance  Prognosis: Fair  Decision Making: Medium Complexity  REQUIRES OT FOLLOW-UP: Yes  Activity Tolerance  Activity Tolerance: Patient limited by fatigue;Patient Tolerated treatment well  Activity Tolerance Comments: Pt o2 sat on RA 94% -- c/o fatigue / desire to lay down - does well with encouragement        Plan   Occupational Therapy Plan  Times Per Week: 2-5x  Times Per Day:  Once a day  Current Treatment Recommendations: Endurance training, Safety education & training, Patient/Caregiver education & training, Self-Care / ADL, Functional mobility training     Restrictions  Position Activity Restriction  Other position/activity restrictions: up with assistance    Subjective   General  Chart Reviewed: Yes  Patient assessed for rehabilitation services?: Yes  Additional Pertinent Hx: Admit 1/25 with Acute Metabolic encephalopathy/ septic shock/ UTI  -recently dx with BPE's 12/2022  CT head- neg bleed, cxray- Bilateral pleural effusions and left greater than right , B foot xray- neg osteo              PMHX:  Bradycardia, Depression, Diabetes mellitus ([address] Hyperlipidemia, Hypertension, TIA, L TKA, back sx,  Family / Caregiver Present: No  Referring Practitioner: Acacia Agosto  Diagnosis: Acute Metabolic encephalopathy/ septic shock/ UTI  Subjective  Subjective: ' I feel awful \" Pt found resting in bed - awake and agreeable for OT eval and tx     Social/Functional History  Social/Functional History  Lives With: Spouse  Type of Home: House  Additional Comments: Pt is poor historian, unclear home layout or PLOF. Objective Treatment included functional transfer training, ADL's and pt. education. Safety Devices  Type of Devices: Nurse notified; Bed alarm in place; Left in bed;Call light within reach  Restraints  Restraints Initially in Place: Yes  Restraints: B wrist restraints  Balance  Sitting:  (Initially min A progressed to SBA sitting EOB for several minutes)  Standing: With support (min A with RW)  Toilet Transfers  Toilet - Technique: Stand step (lateral side step)  Equipment Used: Standard bedside commode  Toilet Transfer: 2 Person assistance; Moderate assistance;Dependent/Total  Toilet Transfers Comments: simulated - stand step lateral transfers  AROM: Generally decreased, functional  Strength: Generally decreased, functional  Coordination: Generally decreased, functional  Tone: Normal  Sensation: Intact  ADL  Feeding: Minimal assistance;Setup; Increased time to complete  Feeding Skilled Clinical Factors: poor appetite  Grooming: Moderate assistance;Verbal cueing; Increased time to complete;Minimal assistance  Grooming Skilled Clinical Factors: wiping face- needing cues for initiation / completion  LE Dressing: Dependent/Total  LE Dressing Skilled Clinical Factors: with socks - limited by poor endurance and large bilateral foot wraps  Toileting: Dependent/Total  Toileting Skilled Clinical Factors: nichols in place  Additional Comments: ADLs limited by poor endurance     Activity Tolerance  Activity Tolerance: Patient tolerated evaluation without incident;Patient limited by fatigue     Transfers  Sit to stand: 2 Person assistance; Moderate assistance;Dependent/Total  Stand to sit: Moderate assistance;2 Person assistance;Dependent/Total  Vision  Vision: Impaired  Vision Exceptions: Wears glasses at all times  Hearing  Hearing: Within functional limits  Cognition  Overall Cognitive Status: Exceptions  Arousal/Alertness: Delayed responses to stimuli  Following Commands: Follows one step commands with repetition; Follows one step commands with increased time  Attention Span: Attends with cues to redirect  Memory: Decreased recall of precautions;Decreased recall of recent events  Safety Judgement: Decreased awareness of need for safety  Problem Solving: Decreased awareness of errors;Assistance required to correct errors made;Assistance required to identify errors made  Insights: Decreased awareness of deficits  Initiation: Requires cues for some  Sequencing: Requires cues for some  Orientation  Orientation Level: Oriented to person;Oriented to place; Disoriented to time;Oriented to situation (initially oriented to situation then later in session stating he's been here for 5 weeks)      Education Given To: Patient  Education Provided: Role of Therapy;Plan of Care;ADL Adaptive Strategies;Transfer Training  Education Method: Demonstration;Verbal  Barriers to Learning: Cognition  Education Outcome: Continued education needed        AM-PAC Score   AM-[facility] Inpatient Daily Activity Raw Score: 9 (02/01/23 1440)  AM-PAC Inpatient ADL T-Scale Score : 25.33 (02/01/23 1440)  ADL Inpatient CMS 0-100% Score: 79.59 (02/01/23 1440)  ADL Inpatient CMS G-Code Modifier : CL (02/01/23 1440)    Goals  Short Term Goals  Time Frame for Short Term Goals: at d/c  Short Term Goal 1: Stance x 5 mins with MIn A for IADLs/ ADLs  Short Term Goal 2: Functional transfers with Min A  Short Term Goal 3: Grooming with Setup and < 2 v.cues  Short Term Goal 4: Oriented to environment 3/4 attempts     Therapy Time   Individual Concurrent Group Co-treatment   Time In 1325         Time Out 1425         Minutes 60             Timed Code Treatment Minutes:   40 mins     Total Treatment Minutes:  60 mins       Ad Morrison OT

## 2023-02-01 NOTE — PROGRESS NOTES
At around 1630 pt agitation started to elevate. Pt refused to allow staff to touch him. Refused all medications. Mental status became increasingly confused. 0.5 mg ativan given. Pt still agitated. Pt pulling on central line and nichols stating him wants to go home. Another 0.5 mg ativan given. Pt repeatedly removing telemetry and trying to leave bed. Verbal redirection and explanation, disguising of equipment were tried prior to placing restraints. Provider notified. Spouse (Hans) notified and updated on patient condition and reasons why restraints were placed. All questions asked and answered.

## 2023-02-01 NOTE — PROGRESS NOTES
HOSPITALISTS PROGRESS NOTE    2/1/2023 2:05 PM        Name: Leonila Sanchez . Admitted: 1/25/2023  Primary Care Provider: Sariah Dang MD (Tel: [phone])      Problem List  Principal Problem:    Sepsis [facility])  Active Problems:    Delirium    Septic shock ([facility]    Decubitus ulcer of sacral region, stage 4 ([facility]    Atrial fibrillation with rapid ventricular response (HCC)    PAC (premature atrial contraction)    PVC (premature ventricular contraction)    Primary hypertension    Mixed hyperlipidemia  Resolved Problems:    * No resolved hospital problems. *       Assessment & Plan:   Anemia with microcytosis  No obvious gi bleeding, no evidence of bleeding on CT scan, rule out hemolytic anemia, check folate B12 PRBCs to keep hemoglobin above 7    Anasarca with poor p.o. intake  On IV albumin, follow-up on protein creatinine ratio,    Recent PE in December  Patient is on heparin drip    Atrial fibrillation with RVR  On heparin drip cardiology following on Toprol and Cardizem    Episodes of agitation with concerns of delirium CT head on 125 negative for any acute abnormality    On patient is on Sinequan and clonazepam both are ordered over    Decubitus ulcer stage IV  Pseudomonas, antibiotic changed to cefepime from Rocephin    IV Access: Peripheral  Joseph:  Diet: ADULT ORAL NUTRITION SUPPLEMENT; Breakfast, Dinner; Wound Healing Oral Supplement  ADULT DIET; Regular  Code:Full Code  DVT PPX Heparin drip  Disposition monitor in the hospital      Brief Course: Patient is admitted on 14JR with the metabolic encephalopathy severe sepsis complicated UTI ALISON he had elevated LFTs on admission also had a history of gastric bypass surgery also found to have sacral decubitus ulcer stage IV      CC: Confusion and agitation    Subjective:  .   Patient is confused, patient is pulling out IVs, unfortunately had to restrain the patient,  No evidence of GI bleeding  D/w RN at bedside     Reviewed interval ancillary notes    Current Medications  cefepime (MAXIPIME) 2,000 mg in sodium chloride 0.9 % 50 mL IVPB (mini-bag), Q12H  dilTIAZem (CARDIZEM CD) extended release capsule 240 mg, Daily  [START ON 2/2/2023] furosemide (LASIX) tablet 20 mg, Daily  LORazepam (ATIVAN) injection 0.5 mg, Q6H PRN  0.9 % sodium chloride infusion, PRN  collagenase ointment, Daily  metoprolol succinate (TOPROL XL) extended release tablet 25 mg, Daily  albumin human 25 % IV solution 25 g, Q8H  doxepin (SINEQUAN) capsule 150 mg, Nightly  thiamine (B-1) injection 100 mg, Daily  melatonin tablet 3 mg, Nightly  Venelex ointment, BID  lactulose (CHRONULAC) 10 GM/15ML solution 10 g, TID  collagenase ointment, Daily  prochlorperazine (COMPAZINE) injection 10 mg, Q6H PRN   Or  prochlorperazine (COMPAZINE) tablet 10 mg, Q6H PRN  silver nitrate applicators applicator, Once  heparin (porcine) injection 6,400 Units, PRN  heparin (porcine) injection 3,200 Units, PRN  heparin 25,000 units in dextrose 5% 250 mL (premix) infusion, Continuous  clonazePAM (KLONOPIN) tablet 1 mg, BID PRN  perflutren lipid microspheres (DEFINITY) injection 1.5 mL, ONCE PRN  sodium chloride flush 0.9 % injection 5-40 mL, 2 times per day  sodium chloride flush 0.9 % injection 5-40 mL, PRN  0.9 % sodium chloride infusion, PRN  polyethylene glycol (GLYCOLAX) packet 17 g, Daily PRN  acetaminophen (TYLENOL) tablet 650 mg, Q6H PRN   Or  acetaminophen (TYLENOL) suppository 650 mg, Q6H PRN  famotidine (PEPCID) 20 mg in sodium chloride (PF) 0.9 % 10 mL injection, Daily        Objective:  /87   Pulse 86   Temp 98.7 °F (37.1 °C) (Axillary)   Resp 16   Ht 6' (1.829 m)   Wt 172 lb 9.9 oz (78.3 kg)   SpO2 97%   BMI 23.41 kg/m²     Intake/Output Summary (Last 24 hours) at 2/1/2023 1405  Last data filed at 2/1/2023 1100  Gross per 24 hour   Intake 563.5 ml   Output 2050 ml   Net -1486.5 ml      Wt Readings from Last 3 Encounters: 02/01/23 172 lb 9.9 oz (78.3 kg)   08/09/19 228 lb (103.4 kg)   03/17/17 231 lb (104.8 kg)       General appearance:  Appears comfortable  Eyes: Sclera clear. Pupils equal.  ENT: Moist oral mucosa. Trachea midline, no adenopathy. Cardiovascular: Regular rhythm, normal S1, S2. No murmur. No edema in lower extremities  Respiratory: Not using accessory muscles. Good inspiratory effort. Clear to auscultation bilaterally, no wheeze or crackles. GI: Abdomen soft, no tenderness, not distended, normal bowel sounds  Musculoskeletal: No cyanosis in digits, neck supple  Neurology: CN 2-12 grossly intact. No speech or motor deficits  Psych: Normal affect. Alert and oriented in time, place and person  Skin: Warm, dry, normal turgor  Extremity exam shows brisk capillary refill. Peripheral pulses are palpable in lower extremities     Labs and Tests:  CBC:   Recent Labs     01/30/23  0601 01/30/23  1215 01/31/23  0418 02/01/23  0522 02/01/23  1350   WBC 7.4  --  9.0 7.2  --    HGB 5.7*   < > 7.1* 6.7* 8.2*     --  183 176  --     < > = values in this interval not displayed. BMP:    Recent Labs     01/30/23  0601 01/31/23  0418 02/01/23  0522     146* 145 145   K 3.4*  3.4* 3.2* 3.4*   *  110 110 110   CO2 23  26 27 26   BUN 15  15 11 9   CREATININE 0.7*  0.7* 0.7* 0.6*   GLUCOSE 82  84 79 79     Hepatic:   Recent Labs     01/31/23  0418   AST 38*   ALT 34   BILITOT 0.8   ALKPHOS 179*     CT ABDOMEN PELVIS W IV CONTRAST Additional Contrast? None   Final Result   1. Evidence for third spacing of fluid with moderate bilateral pleural effusions, trace ascites and anasarca. 2. No retroperitoneal hematoma. 3. Small hiatal hernia. US DUP ABD PEL RETRO SCROT COMPLETE   Final Result      1. Patent portal veins, hepatic artery and hepatic veins. XR FOOT LEFT (MIN 3 VIEWS)   Final Result      1. No definite evidence of active osteomyelitis.       XR FOOT RIGHT (MIN 3 VIEWS)   Final Result 1. No definite evidence of active osteomyelitis. XR CHEST PORTABLE   Final Result         1. Good position of left IJ central venous catheter in the lower SVC   2. No pneumothorax   3. Bilateral pleural effusions and left greater than right lung airspace disease             POC US ECHOCARDIO TRANSTHORACIC LTD   Final Result      CT Head W/O Contrast   Final Result      No evidence of acute intracranial abnormality. XR CHEST PORTABLE   Final Result   1. Abnormal airspace disease throughout the left hemithorax.    2. Follow-up is recommended with specific attention to obtaining a upright PA and lateral chest                Mayra Telles MD   2/1/2023 2:05 PM

## 2023-02-01 NOTE — PLAN OF CARE
Problem: Pain  Goal: Verbalizes/displays adequate comfort level or baseline comfort level  Outcome: Progressing  Flowsheets (Taken 2/1/2023 1845)  Verbalizes/displays adequate comfort level or baseline comfort level:   Encourage patient to monitor pain and request assistance   Assess pain using appropriate pain scale   Administer analgesics based on type and severity of pain and evaluate response   Implement non-pharmacological measures as appropriate and evaluate response  Note: Pt repositioned Q2 and PRN for comfort. Pt able to rate pain using numeric pain scale. Pt indicated satisfaction with pharmaceutical pain intervention. Problem: Safety - Adult  Goal: Free from fall injury  Flowsheets (Taken 2/1/2023 1846)  Free From Fall Injury: Instruct family/caregiver on patient safety  Note: Pt is a Fall Risk. See [facility] Fall Risk Score. Pt bed in low position and side rails up. Call light and belongings in reach. Pt encouraged to call for assistance. Will continue with hourly rounds for PO intake, pain needs, toileting, and repositioning as needed. Problem: Skin/Tissue Integrity  Goal: Absence of new skin breakdown  Description: 1. Monitor for areas of redness and/or skin breakdown  2. Assess vascular access sites hourly  3. Every 4-6 hours minimum:  Change oxygen saturation probe site  4. Every 4-6 hours:  If on nasal continuous positive airway pressure, respiratory therapy assess nares and determine need for appliance change or resting period. Outcome: Progressing  Note: Pt turned Q2 and PRN. Offloading boots worn. Dressing changes performed on feet and coccyx following orders. No new signs of skin breakdown seen.      Problem: Chronic Conditions and Co-morbidities  Goal: Patient's chronic conditions and co-morbidity symptoms are monitored and maintained or improved  Outcome: Progressing  Flowsheets (Taken 2/1/2023 1846)  Care Plan - Patient's Chronic Conditions and Co-Morbidity Symptoms are Monitored and Maintained or Improved:   Monitor and assess patient's chronic conditions and comorbid symptoms for stability, deterioration, or improvement   Collaborate with multidisciplinary team to address chronic and comorbid conditions and prevent exacerbation or deterioration   Update acute care plan with appropriate goals if chronic or comorbid symptoms are exacerbated and prevent overall improvement and discharge  Note: Pt NSR to sinus tach on continuous telemetry. Other v/s stable. Blood transfused for critical hemoglobin per orders. Hemoglobin 8.2 after transfusion. Heparin gtt restarted per orders at last rate ordered. Problem: Safety - Medical Restraint  Goal: Remains free of injury from restraints (Restraint for Interference with Medical Device)  Description: INTERVENTIONS:  1. Determine that other, less restrictive measures have been tried or would not be effective before applying the restraint  2. Evaluate the patient's condition at the time of restraint application  3. Inform patient/family regarding the reason for restraint  4. Q2H: Monitor safety, psychosocial status, comfort, nutrition and hydration  Outcome: Progressing  Flowsheets (Taken 2/1/2023 1846)  Remains free of injury from restraints (restraint for interference with medical device):   Determine that other, less restrictive measures have been tried or would not be effective before applying the restraint   Evaluate the patient's condition at the time of restraint application   Inform patient/family regarding the reason for restraint   Every 2 hours: Monitor safety, psychosocial status, comfort, nutrition and hydration  Note: Attempted redirection, disguising equipment, deescalation prior to applying restraint. Pt actively hallucinating this am and later in shift. Partner, Ricki Ellison, informed of restraint and reasons (pt actively pulling nichols, central line, and removing telemetry). Pt aggressive with staff.   Restraints removed every 2 hours and monitored. Offered drink and food during each pt interaction. Mentation has waxed and waned throughout shift, with pt actively hallucinating at times.

## 2023-02-01 NOTE — PROGRESS NOTES
Pharmacy Note - Renal Dosing    Cefepime ordered for treatment of SSTI. Per [facility] Extended Infusion Beta-Lactam Policy, Cefepime will be changed to 2000mg IV x1 over 30 min, followed by 2000 mg IV EI q12h. Estimated Creatinine Clearance: Estimated Creatinine Clearance: 140 mL/min (A) (based on SCr of 0.6 mg/dL (L)). Dialysis Status, ALISON, CKD: n/a  BMI: Body mass index is 23.41 kg/m². Rationale for Adjustment: Agent is renally eliminated and demonstrates time-dependent effect on bacterial eradication. Extended-infusion dosing strategy aims to enhance microbiologic and clinical efficacy. Pharmacy will continue to monitor renal function and adjust dose as necessary.       Please call with questions--  Britney Perez PharmD, BCPS  Wireless: [id number]   2/1/2023 9:47 AM

## 2023-02-01 NOTE — PROGRESS NOTES
Palliative Care Chart Review  and Check in Note:     NAME:  Misael Porter  Admit Date: 1/25/2023  Hospital Day:  Hospital Day: 8   Current Code status: Full Code    Palliative care is continuing to following Mr. Libia Goss for symptom management,  and goals of care discussion as needed. Patient's chart reviewed today 2/1/23. Pt is agitated this morning, calling out. No visitors currently present. The following are the currently established goals/code status, and Symptom management. Goals of care: Continue with current management at this time. Pt/ hopeful that he will improve to baseline and take initiative to improve his functional status.      Code status: Full Code, discussed this admission    Discharge plan: TBD pending hospital course      DEWAYNE Atkinson CNP  02/01/23  9:17 AM

## 2023-02-01 NOTE — PLAN OF CARE
Problem: Safety - Medical Restraint  Goal: Remains free of injury from restraints (Restraint for Interference with Medical Device)  Description: INTERVENTIONS:  1. Determine that other, less restrictive measures have been tried or would not be effective before applying the restraint  2. Evaluate the patient's condition at the time of restraint application  3. Inform patient/family regarding the reason for restraint  4. Q2H: Monitor safety, psychosocial status, comfort, nutrition and hydration  Outcome: Not Progressing  Flowsheets (Taken 1/31/2023 2017)  Remains free of injury from restraints (restraint for interference with medical device):   Determine that other, less restrictive measures have been tried or would not be effective before applying the restraint   Evaluate the patient's condition at the time of restraint application   Inform patient/family regarding the reason for restraint   Every 2 hours: Monitor safety, psychosocial status, comfort, nutrition and hydration  Note: Pt placed in restraints after pulling central line and nichols. Pt repeated removed telemetry and was agitated. Education, redirection, disguising lines and tubing were attempted multiple times before restraints were applied. Spouse notified about need for restraint and reasons why. Restraints monitored Q2.

## 2023-02-01 NOTE — PROGRESS NOTES
Dressing changed on feet following podiatry orders. Dressing changed on sacrum following wound care orders.

## 2023-02-01 NOTE — PROGRESS NOTES
Physical Therapy  Facility/Department: [place] [address] [facility]  Physical Therapy Initial Assessment/Treatment    Name: Ketty Dunne  : 1960  MRN: [id number]  Date of Service: 2023    Discharge Recommendations:    Ketty Dunne scored a 9/24 on the AM-PAC short mobility form. Current research shows that an AM-PAC score of 17 or less is typically not associated with a discharge to the patient's home setting. Based on the patient's AM-PAC score and their current functional mobility deficits, it is recommended that the patient have 5-7 sessions per week of Physical Therapy at d/c to increase the patient's independence. At this time, this patient demonstrates complex nursing, medical, and rehabilitative needs, and would benefit from intensive rehabilitation services upon discharge from the Inpatient setting. This patient demonstrates the ability to participate in and benefit from an intensive therapy program with a coordinated interdisciplinary team approach to foster frequent, structured, and documented communication among disciplines, who will work together to establish, prioritize, and achieve treatment goals. Please see assessment section for further patient specific details. If patient discharges prior to next session this note will serve as a discharge summary. Please see below for the latest assessment towards goals. PT Equipment Recommendations  Equipment Needed: No (defer)      Patient Diagnosis(es): The primary encounter diagnosis was Delirium. Diagnoses of Sepsis with encephalopathy without septic shock, due to unspecified organism ([address] and Urinary tract infection without hematuria, site unspecified were also pertinent to this visit.   Past Medical History:  has a past medical history of Bradycardia, Depression, Diabetes mellitus ([address] Hyperlipidemia, Hypertension, Osteoarthritis of lumbar spine, Primary osteoarthritis of lumbar spine, Unspecified cerebral artery occlusion with cerebral infarction, and Unspecified cerebral artery occlusion with cerebral infarction. Past Surgical History:  has a past surgical history that includes back surgery; joint replacement; Gastric bypass surgery; Tonsillectomy; Upper gastrointestinal endoscopy (N/A, 8/9/2019); and Colonoscopy (N/A, 8/9/2019). Assessment   Assessment: pt presents from home with AMS. Pt is currently requiring mod to max A for bed mobility, mod a x2 to stand, min A x1 in standing and able to march in place, tolerates standing for 2min with RW. Pt limited by fatigue and confusion throughout session. Per chart review, pt may have had recent decline in function and required use of RW for mobility. Pt currently unsafe to ambulate and is below his baseline. Pt would benefit from continued IP PT at [place] to progress mobility toward independence. PT will f/u. Treatment Diagnosis: gait difficulty  Therapy Prognosis: Good  Decision Making: Medium Complexity  Requires PT Follow-Up: Yes  Activity Tolerance  Activity Tolerance: Patient tolerated evaluation without incident;Patient limited by fatigue     Plan   Physcial Therapy Plan  General Plan:  (2-5x/week)  Current Treatment Recommendations: Strengthening, Balance training, Endurance training, Functional mobility training, Transfer training, Gait training, Stair training, Safety education & training, Therapeutic activities  Safety Devices  Type of Devices: Nurse notified, Bed alarm in place, Left in bed, Call light within reach  Restraints  Restraints Initially in Place: Yes  Restraints: B wrist restraints     Restrictions  Position Activity Restriction  Other position/activity restrictions: up with assistance     Subjective   General  Chart Reviewed: Yes  Patient assessed for rehabilitation services?: Yes  Additional Pertinent Hx:  This is a 58 y.o. male with Hx of chronic back pain, pulmonary embolism (12/2022) on Eliquis, CKD, DM, HTN, HLD, TIA, stroke, pancreatitis, gastric bypass who presented with altered mental status from home. General surgery is following for a known stage 4 sacral ulcer now s/p bedside debridement (01/26). Diabetic foot ulcerations bilateral; stable and WBAT. Initial work-up revealed leukocytosis, tachycardia, hypotension, and urinalysis concerning for infection and empiric antibiotics were initiated to cover sepsis of unknown etiology including urinary tract infection. Family / Caregiver Present: No  Referring Practitioner: Parul Samson MD  Referral Date : 01/30/23  Diagnosis: sepsis  Follows Commands: Impaired  General Comment  Comments: pt cleared to see for therapy by RN  Subjective  Subjective: pt semisupine in bed on arrival, agreeable to therapy with coaxing. \"I've been trying to get up and they won't let me. \"         Social/Functional History  Social/Functional History  Lives With: Spouse  Type of Home: House  Additional Comments: Pt is poor historian, unclear home layout or PLOF. Vision/Hearing  Vision  Vision: Impaired  Vision Exceptions: Wears glasses at all times  Hearing  Hearing: Within functional limits    Cognition   Orientation  Orientation Level: Oriented to person;Oriented to place; Disoriented to time;Oriented to situation (initially oriented to situation then later in session stating he's been here for 5 weeks)  Cognition  Overall Cognitive Status: Exceptions  Arousal/Alertness: Delayed responses to stimuli  Following Commands: Follows one step commands with repetition; Follows one step commands with increased time  Attention Span: Attends with cues to redirect  Memory: Decreased recall of precautions;Decreased recall of recent events  Safety Judgement: Decreased awareness of need for safety  Problem Solving: Decreased awareness of errors;Assistance required to correct errors made;Assistance required to identify errors made  Insights: Decreased awareness of deficits  Initiation: Requires cues for some  Sequencing: Requires cues for some     Objective   Heart Rate: 86  Heart Rate Source: Monitor  BP: 138/87  BP Location: Right Arm  BP Method: Automatic  Patient Position: Semi fowlers  MAP (Calculated): 104  Resp: 16  SpO2: 97 %  O2 Device: None (Room air)        Gross Assessment  AROM: Generally decreased, functional  Strength: Generally decreased, functional (able to lift against gravity in semisupine)                 Balance  Sitting:  (Initially min A progressed to SBA sitting EOB for several minutes)  Standing: With support (min A with RW)  Bed mobility  Supine to Sit: Moderate assistance (Assist at trunk, cues for managing LEs off bed)  Sit to Supine: Maximum assistance (Assist at trunk and LEs)  Scooting: Maximal assistance (to EOB)  Transfers  Sit to Stand: Moderate Assistance;2 Person Assistance (use of RW, cues for hand placement, bed slightly elevated. Tolerated standing for 2min + 1min with min A)  Stand to Sit: Moderate Assistance;2 Person Assistance  Comment: Pt able to take a few sidesteps to [facility] to L with min A once standing.            AM-PAC Score  -PAC Inpatient Mobility Raw Score : 9 (02/01/23 1433)  AM-PAC Inpatient T-Scale Score : 30.55 (02/01/23 1433)  Mobility Inpatient CMS 0-100% Score: 81.38 (02/01/23 1433)  Mobility Inpatient CMS G-Code Modifier : CM (02/01/23 1433)          Goals  Short Term Goals  Time Frame for Short Term Goals: discharge  Short Term Goal 1: pt will perf supine<>sit with CGA  Short Term Goal 2: pt will tolerate SPT with RW and min A x1  Short Term Goal 3: pt will amb 30ft with RW min A       Education  Patient Education  Education Given To: Patient  Education Provided: Role of Therapy;Plan of Care;Transfer Training;Orientation  Education Method: Verbal  Barriers to Learning: Cognition  Education Outcome: Continued education needed      Therapy Time   Individual Concurrent Group Co-treatment   Time In 1325         Time Out 1425         Minutes 60             Timed Code Treatment Minutes:45    Total Treatment Minutes:60  If patient is discharged prior to next treatment, this note will serve as the discharge summary.     Ulysses Bender PT, DPT, NCS, CSRS

## 2023-02-02 LAB
ALBUMIN SERPL-MCNC: 3.7 G/DL (ref 3.4–5)
ALP BLD-CCNC: 122 U/L (ref 40–129)
ALT SERPL-CCNC: 24 U/L (ref 10–40)
ANION GAP SERPL CALCULATED.3IONS-SCNC: 11 MMOL/L (ref 3–16)
ANTI-XA UNFRAC HEPARIN: 0.14 IU/ML (ref 0.3–0.7)
ANTI-XA UNFRAC HEPARIN: 0.23 IU/ML (ref 0.3–0.7)
ANTI-XA UNFRAC HEPARIN: 0.46 IU/ML (ref 0.3–0.7)
AST SERPL-CCNC: 24 U/L (ref 15–37)
BASOPHILS ABSOLUTE: 0 K/UL (ref 0–0.2)
BASOPHILS RELATIVE PERCENT: 0.2 %
BILIRUB SERPL-MCNC: 1 MG/DL (ref 0–1)
BILIRUBIN DIRECT: 0.4 MG/DL (ref 0–0.3)
BILIRUBIN, INDIRECT: 0.6 MG/DL (ref 0–1)
BLOOD SMEAR REVIEW: NORMAL
BUN BLDV-MCNC: 9 MG/DL (ref 7–20)
CALCIUM SERPL-MCNC: 8.5 MG/DL (ref 8.3–10.6)
CHLORIDE BLD-SCNC: 109 MMOL/L (ref 99–110)
CO2: 27 MMOL/L (ref 21–32)
CREAT SERPL-MCNC: 0.6 MG/DL (ref 0.8–1.3)
EKG ATRIAL RATE: 94 BPM
EKG DIAGNOSIS: NORMAL
EKG P AXIS: 79 DEGREES
EKG P-R INTERVAL: 174 MS
EKG Q-T INTERVAL: 342 MS
EKG QRS DURATION: 92 MS
EKG QTC CALCULATION (BAZETT): 427 MS
EKG R AXIS: 66 DEGREES
EKG T AXIS: 17 DEGREES
EKG VENTRICULAR RATE: 94 BPM
EOSINOPHILS ABSOLUTE: 0 K/UL (ref 0–0.6)
EOSINOPHILS RELATIVE PERCENT: 0.1 %
FERRITIN: 249.8 NG/ML (ref 30–400)
GFR SERPL CREATININE-BSD FRML MDRD: >60 ML/MIN/{1.73_M2}
GLUCOSE BLD-MCNC: 79 MG/DL (ref 70–99)
HCT VFR BLD CALC: 23.7 % (ref 40.5–52.5)
HEMOGLOBIN: 8 G/DL (ref 13.5–17.5)
IRON SATURATION: 54 % (ref 20–50)
IRON: 36 UG/DL (ref 59–158)
LYMPHOCYTES ABSOLUTE: 1.4 K/UL (ref 1–5.1)
LYMPHOCYTES RELATIVE PERCENT: 17.7 %
MAGNESIUM: 1.6 MG/DL (ref 1.8–2.4)
MCH RBC QN AUTO: 33.6 PG (ref 26–34)
MCHC RBC AUTO-ENTMCNC: 33.7 G/DL (ref 31–36)
MCV RBC AUTO: 99.5 FL (ref 80–100)
MONOCYTES ABSOLUTE: 0.8 K/UL (ref 0–1.3)
MONOCYTES RELATIVE PERCENT: 10.9 %
NEUTROPHILS ABSOLUTE: 5.5 K/UL (ref 1.7–7.7)
NEUTROPHILS RELATIVE PERCENT: 71.1 %
PDW BLD-RTO: 16.6 % (ref 12.4–15.4)
PLATELET # BLD: 189 K/UL (ref 135–450)
PMV BLD AUTO: 7.4 FL (ref 5–10.5)
POTASSIUM REFLEX MAGNESIUM: 3.1 MMOL/L (ref 3.5–5.1)
RBC # BLD: 2.38 M/UL (ref 4.2–5.9)
SODIUM BLD-SCNC: 147 MMOL/L (ref 136–145)
TOTAL IRON BINDING CAPACITY: 67 UG/DL (ref 260–445)
TOTAL PROTEIN: 5.8 G/DL (ref 6.4–8.2)
WBC # BLD: 7.8 K/UL (ref 4–11)

## 2023-02-02 PROCEDURE — 6370000000 HC RX 637 (ALT 250 FOR IP): Performed by: STUDENT IN AN ORGANIZED HEALTH CARE EDUCATION/TRAINING PROGRAM

## 2023-02-02 PROCEDURE — 83540 ASSAY OF IRON: CPT

## 2023-02-02 PROCEDURE — 2060000000 HC ICU INTERMEDIATE R&B

## 2023-02-02 PROCEDURE — P9047 ALBUMIN (HUMAN), 25%, 50ML: HCPCS | Performed by: INTERNAL MEDICINE

## 2023-02-02 PROCEDURE — 2580000003 HC RX 258: Performed by: INTERNAL MEDICINE

## 2023-02-02 PROCEDURE — 6360000002 HC RX W HCPCS: Performed by: STUDENT IN AN ORGANIZED HEALTH CARE EDUCATION/TRAINING PROGRAM

## 2023-02-02 PROCEDURE — 83550 IRON BINDING TEST: CPT

## 2023-02-02 PROCEDURE — 93010 ELECTROCARDIOGRAM REPORT: CPT | Performed by: INTERNAL MEDICINE

## 2023-02-02 PROCEDURE — 6360000002 HC RX W HCPCS: Performed by: INTERNAL MEDICINE

## 2023-02-02 PROCEDURE — 6370000000 HC RX 637 (ALT 250 FOR IP): Performed by: INTERNAL MEDICINE

## 2023-02-02 PROCEDURE — 85025 COMPLETE CBC W/AUTO DIFF WBC: CPT

## 2023-02-02 PROCEDURE — A4216 STERILE WATER/SALINE, 10 ML: HCPCS | Performed by: STUDENT IN AN ORGANIZED HEALTH CARE EDUCATION/TRAINING PROGRAM

## 2023-02-02 PROCEDURE — 80076 HEPATIC FUNCTION PANEL: CPT

## 2023-02-02 PROCEDURE — 6370000000 HC RX 637 (ALT 250 FOR IP): Performed by: NURSE PRACTITIONER

## 2023-02-02 PROCEDURE — 85520 HEPARIN ASSAY: CPT

## 2023-02-02 PROCEDURE — 82728 ASSAY OF FERRITIN: CPT

## 2023-02-02 PROCEDURE — 80048 BASIC METABOLIC PNL TOTAL CA: CPT

## 2023-02-02 PROCEDURE — 99233 SBSQ HOSP IP/OBS HIGH 50: CPT | Performed by: NURSE PRACTITIONER

## 2023-02-02 PROCEDURE — 2500000003 HC RX 250 WO HCPCS: Performed by: STUDENT IN AN ORGANIZED HEALTH CARE EDUCATION/TRAINING PROGRAM

## 2023-02-02 PROCEDURE — 83735 ASSAY OF MAGNESIUM: CPT

## 2023-02-02 PROCEDURE — 2580000003 HC RX 258: Performed by: STUDENT IN AN ORGANIZED HEALTH CARE EDUCATION/TRAINING PROGRAM

## 2023-02-02 RX ORDER — METOPROLOL SUCCINATE 25 MG/1
25 TABLET, EXTENDED RELEASE ORAL 2 TIMES DAILY
Status: DISCONTINUED | OUTPATIENT
Start: 2023-02-02 | End: 2023-02-04 | Stop reason: HOSPADM

## 2023-02-02 RX ORDER — POTASSIUM CHLORIDE 20 MEQ/1
40 TABLET, EXTENDED RELEASE ORAL ONCE
Status: COMPLETED | OUTPATIENT
Start: 2023-02-02 | End: 2023-02-02

## 2023-02-02 RX ORDER — MULTIVITAMIN WITH IRON
1 TABLET ORAL DAILY
Status: DISCONTINUED | OUTPATIENT
Start: 2023-02-02 | End: 2023-02-04 | Stop reason: HOSPADM

## 2023-02-02 RX ORDER — MAGNESIUM SULFATE IN WATER 40 MG/ML
2000 INJECTION, SOLUTION INTRAVENOUS PRN
Status: DISCONTINUED | OUTPATIENT
Start: 2023-02-02 | End: 2023-02-04 | Stop reason: HOSPADM

## 2023-02-02 RX ORDER — ZINC SULFATE 50(220)MG
50 CAPSULE ORAL DAILY
Status: DISCONTINUED | OUTPATIENT
Start: 2023-02-02 | End: 2023-02-04 | Stop reason: HOSPADM

## 2023-02-02 RX ORDER — ALBUMIN (HUMAN) 12.5 G/50ML
25 SOLUTION INTRAVENOUS 2 TIMES DAILY
Status: DISCONTINUED | OUTPATIENT
Start: 2023-02-02 | End: 2023-02-03

## 2023-02-02 RX ADMIN — HEPARIN SODIUM 3200 UNITS: 1000 INJECTION INTRAVENOUS; SUBCUTANEOUS at 15:24

## 2023-02-02 RX ADMIN — THERA TABS 1 TABLET: TAB at 11:19

## 2023-02-02 RX ADMIN — LORAZEPAM 0.5 MG: 2 INJECTION INTRAMUSCULAR; INTRAVENOUS at 00:17

## 2023-02-02 RX ADMIN — POTASSIUM CHLORIDE 40 MEQ: 1500 TABLET, EXTENDED RELEASE ORAL at 18:01

## 2023-02-02 RX ADMIN — THIAMINE HYDROCHLORIDE 100 MG: 100 INJECTION, SOLUTION INTRAMUSCULAR; INTRAVENOUS at 09:16

## 2023-02-02 RX ADMIN — DOXEPIN HYDROCHLORIDE 150 MG: 25 CAPSULE ORAL at 21:51

## 2023-02-02 RX ADMIN — METOPROLOL SUCCINATE 25 MG: 25 TABLET, EXTENDED RELEASE ORAL at 21:51

## 2023-02-02 RX ADMIN — ALBUMIN (HUMAN) 25 G: 0.25 INJECTION, SOLUTION INTRAVENOUS at 11:40

## 2023-02-02 RX ADMIN — SODIUM CHLORIDE, PRESERVATIVE FREE 10 ML: 5 INJECTION INTRAVENOUS at 09:00

## 2023-02-02 RX ADMIN — CEFEPIME HYDROCHLORIDE 2000 MG: 2 INJECTION, POWDER, FOR SOLUTION INTRAMUSCULAR; INTRAVENOUS at 21:46

## 2023-02-02 RX ADMIN — SODIUM CHLORIDE, PRESERVATIVE FREE 20 MG: 5 INJECTION INTRAVENOUS at 09:16

## 2023-02-02 RX ADMIN — METOPROLOL SUCCINATE 25 MG: 25 TABLET, EXTENDED RELEASE ORAL at 09:15

## 2023-02-02 RX ADMIN — CEFEPIME HYDROCHLORIDE 2000 MG: 2 INJECTION, POWDER, FOR SOLUTION INTRAMUSCULAR; INTRAVENOUS at 09:32

## 2023-02-02 RX ADMIN — LACTULOSE 10 G: 10 SOLUTION ORAL at 09:16

## 2023-02-02 RX ADMIN — COLLAGENASE SANTYL: 250 OINTMENT TOPICAL at 09:19

## 2023-02-02 RX ADMIN — LORAZEPAM 0.5 MG: 2 INJECTION INTRAMUSCULAR; INTRAVENOUS at 15:22

## 2023-02-02 RX ADMIN — HEPARIN SODIUM 6400 UNITS: 1000 INJECTION INTRAVENOUS; SUBCUTANEOUS at 00:30

## 2023-02-02 RX ADMIN — COLLAGENASE SANTYL: 250 OINTMENT TOPICAL at 09:18

## 2023-02-02 RX ADMIN — LORAZEPAM 0.5 MG: 2 INJECTION INTRAMUSCULAR; INTRAVENOUS at 21:39

## 2023-02-02 RX ADMIN — DILTIAZEM HYDROCHLORIDE 240 MG: 240 CAPSULE, COATED, EXTENDED RELEASE ORAL at 09:16

## 2023-02-02 RX ADMIN — HEPARIN SODIUM 3200 UNITS: 1000 INJECTION INTRAVENOUS; SUBCUTANEOUS at 07:37

## 2023-02-02 RX ADMIN — ZINC SULFATE 220 MG (50 MG) CAPSULE 50 MG: CAPSULE at 15:02

## 2023-02-02 RX ADMIN — POTASSIUM CHLORIDE 40 MEQ: 1500 TABLET, EXTENDED RELEASE ORAL at 07:43

## 2023-02-02 RX ADMIN — Medication 3 MG: at 21:51

## 2023-02-02 RX ADMIN — HEPARIN SODIUM 20 UNITS/KG/HR: 10000 INJECTION, SOLUTION INTRAVENOUS at 05:10

## 2023-02-02 RX ADMIN — LACTULOSE 10 G: 10 SOLUTION ORAL at 15:02

## 2023-02-02 RX ADMIN — LACTULOSE 10 G: 10 SOLUTION ORAL at 21:51

## 2023-02-02 RX ADMIN — MAGNESIUM SULFATE HEPTAHYDRATE 2000 MG: 40 INJECTION, SOLUTION INTRAVENOUS at 09:29

## 2023-02-02 RX ADMIN — SODIUM CHLORIDE, PRESERVATIVE FREE 10 ML: 5 INJECTION INTRAVENOUS at 21:47

## 2023-02-02 RX ADMIN — FUROSEMIDE 20 MG: 20 TABLET ORAL at 09:16

## 2023-02-02 RX ADMIN — CLONAZEPAM 1 MG: 1 TABLET ORAL at 12:28

## 2023-02-02 RX ADMIN — ALBUMIN (HUMAN) 25 G: 0.25 INJECTION, SOLUTION INTRAVENOUS at 03:45

## 2023-02-02 RX ADMIN — Medication: at 09:18

## 2023-02-02 RX ADMIN — LORAZEPAM 0.5 MG: 2 INJECTION INTRAMUSCULAR; INTRAVENOUS at 07:39

## 2023-02-02 RX ADMIN — ACETAMINOPHEN 650 MG: 325 TABLET, FILM COATED ORAL at 10:13

## 2023-02-02 RX ADMIN — HEPARIN SODIUM 24 UNITS/KG/HR: 10000 INJECTION, SOLUTION INTRAVENOUS at 21:39

## 2023-02-02 RX ADMIN — Medication: at 22:00

## 2023-02-02 RX ADMIN — ALBUMIN (HUMAN) 25 G: 0.25 INJECTION, SOLUTION INTRAVENOUS at 22:07

## 2023-02-02 ASSESSMENT — PAIN - FUNCTIONAL ASSESSMENT: PAIN_FUNCTIONAL_ASSESSMENT: ACTIVITIES ARE NOT PREVENTED

## 2023-02-02 ASSESSMENT — PAIN DESCRIPTION - LOCATION: LOCATION: HEAD

## 2023-02-02 ASSESSMENT — PAIN SCALES - GENERAL: PAINLEVEL_OUTOF10: 3

## 2023-02-02 ASSESSMENT — PAIN DESCRIPTION - DESCRIPTORS: DESCRIPTORS: DISCOMFORT

## 2023-02-02 ASSESSMENT — PAIN DESCRIPTION - ORIENTATION: ORIENTATION: ANTERIOR;POSTERIOR

## 2023-02-02 NOTE — CONSULTS
[facility]    Consult Note      Requesting Physician:  Dr. Taisha Vogel:  [facility]    HISTORY OF PRESENT ILLNESS:    Mr. Cynthia Avila  is a 58 y.o. male with PMHx HTN, HLD, DM, PE on eliquis, CKD gastric bypass who we are seeing in consultation for \"anemia without active bleeding source requiring PRBC. \" Patient was originally admitted to ICU on 1/25/2023 due to altered mental status from home. Per chart review, it seems that he has been declining with respect to his memory and ability to perform ADL gradually over the course of several months. He was assessed as having acute metabolic encephalopathy with severe sepsis (source thought to be from urine or stage 4 sacral decubitus ulcer) and started on vancomycin amd merrem. The patient was weaned off vasopressor requirement and transferred out of ICU 1/27. Debridement performed 1/29/23. ICD-10-CM    1. Delirium  R41.0       2. Sepsis with encephalopathy without septic shock, due to unspecified organism ([address]  A41.9 CENTRAL LINE    R65.20 Central Line    G93.40       3.  Urinary tract infection without hematuria, site unspecified  N39.0              Past Medical History:  Past Medical History:   Diagnosis Date    Bradycardia     Depression     Diabetes mellitus ([address]     no meds    Hyperlipidemia     Hypertension     Osteoarthritis of lumbar spine     Primary osteoarthritis of lumbar spine     Unspecified cerebral artery occlusion with cerebral infarction 2003    TIA    Unspecified cerebral artery occlusion with cerebral infarction        Past Surgical History:  Past Surgical History:   Procedure Laterality Date    BACK SURGERY      COLONOSCOPY N/A 8/9/2019    COLONOSCOPY WITH BIOPSY performed by Sera Villa MD at [facility],  [address]      left knee    TONSILLECTOMY      UPPER GASTROINTESTINAL ENDOSCOPY N/A 8/9/2019    EGD BIOPSY performed by Price Krishnan MD at [facility] ENDOSCOPY       Current Medications:  Current Facility-Administered Medications   Medication Dose Route Frequency Provider Last Rate Last Admin    magnesium sulfate 2000 mg in 50 mL IVPB premix  2,000 mg IntraVENous PRN Anna Jara DO   Stopped at 02/02/23 1129    multivitamin 1 tablet  1 tablet Oral Daily Mayra Garcia MD   1 tablet at 02/02/23 1119    metoprolol succinate (TOPROL XL) extended release tablet 25 mg  25 mg Oral BID Ariana Yee, APRN - CNP        potassium chloride (KLOR-CON M) extended release tablet 40 mEq  40 mEq Oral Once Ariana Yee, APRN - CNP        zinc sulfate (ZINCATE) capsule 50 mg  50 mg Oral Daily Mayra Garcia MD        albumin human 25 % IV solution 25 g  25 g IntraVENous BID Mayra Garcia MD        cefepime (MAXIPIME) 2,000 mg in sodium chloride 0.9 % 50 mL IVPB (mini-bag)  2,000 mg IntraVENous Q12H Mayra Garcia MD 12.5 mL/hr at 02/02/23 0932 2,000 mg at 02/02/23 0932    dilTIAZem (CARDIZEM CD) extended release capsule 240 mg  240 mg Oral Daily Ariana Yee, APRN - CNP   240 mg at 02/02/23 0916    furosemide (LASIX) tablet 20 mg  20 mg Oral Daily Ariana Yee, APRN - CNP   20 mg at 02/02/23 0916    LORazepam (ATIVAN) injection 0.5 mg  0.5 mg IntraVENous Q6H PRN Noe Tarango MD   0.5 mg at 02/02/23 0739    0.9 % sodium chloride infusion   IntraVENous PRN Jermaine Ferraro MD        collagenase ointment   Topical Daily Delmy Patel MD   Given at 02/02/23 0918    doxepin (SINEQUAN) capsule 150 mg  150 mg Oral Nightly Vivek Ferreira MD   150 mg at 02/01/23 2202    thiamine (B-1) injection 100 mg  100 mg IntraVENous Daily Gregor Rojas MD   100 mg at 02/02/23 0916    melatonin tablet 3 mg  3 mg Oral Nightly Gregor Rojas MD   3 mg at 02/01/23 2013    Venelex ointment   Topical BID Vivek Ferreira MD   Given at 02/02/23 9420    lactulose (CHRONULAC) 10 GM/15ML solution 10 g  10 g Oral TID Beena Vargas MD   10 g at 02/02/23 0916    collagenase ointment   Topical Daily Beena Vargas MD   Given at 02/02/23 0919    prochlorperazine (COMPAZINE) injection 10 mg  10 mg IntraVENous Q6H PRN Beena Vargas MD        Or    prochlorperazine (COMPAZINE) tablet 10 mg  10 mg Oral Q6H PRN Beena Vargas MD        silver nitrate applicators applicator   Topical Once Beena Vargas MD        heparin (porcine) injection 6,400 Units  80 Units/kg IntraVENous PRN Beena Vargas MD   6,400 Units at 02/02/23 0030    heparin (porcine) injection 3,200 Units  40 Units/kg IntraVENous PRN Beena Vargas MD   3,200 Units at 02/02/23 0737    heparin 25,000 units in dextrose 5% 250 mL (premix) infusion  5-30 Units/kg/hr IntraVENous Continuous Beena Vargas MD 17.6 mL/hr at 02/02/23 0737 22 Units/kg/hr at 02/02/23 0737    clonazePAM (KLONOPIN) tablet 1 mg  1 mg Oral BID PRN Tracy Kessler MD   1 mg at 02/02/23 1228    perflutren lipid microspheres (DEFINITY) injection 1.5 mL  1.5 mL IntraVENous ONCE PRN Beena Vargas MD        sodium chloride flush 0.9 % injection 5-40 mL  5-40 mL IntraVENous 2 times per day Beena Vargas MD   10 mL at 02/02/23 0900    sodium chloride flush 0.9 % injection 5-40 mL  5-40 mL IntraVENous PRN Beena Vargas MD        0.9 % sodium chloride infusion   IntraVENous PRN Beena Vargas MD 5 mL/hr at 02/01/23 2256 5 mL/hr at 02/01/23 2256    polyethylene glycol (GLYCOLAX) packet 17 g  17 g Oral Daily PRN Beena Vargas MD        acetaminophen (TYLENOL) tablet 650 mg  650 mg Oral Q6H PRN Beena Vargas MD   650 mg at 02/02/23 1013    Or    acetaminophen (TYLENOL) suppository 650 mg  650 mg Rectal Q6H PRN Beena Vargas MD        famotidine (PEPCID) 20 mg in sodium chloride (PF) 0.9 % 10 mL injection  20 mg IntraVENous Daily Beena Vargas MD   20 mg at 02/02/23 153       Allergies:  No Known Allergies    Social History:  Social History     Socioeconomic History Marital status:      Spouse name: Not on file    Number of children: Not on file    Years of education: Not on file    Highest education level: Not on file   Occupational History    Not on file   Tobacco Use    Smoking status: Never    Smokeless tobacco: Not on file   Substance and Sexual Activity    Alcohol use: No    Drug use: No    Sexual activity: Not on file   Other Topics Concern    Not on file   Social History Narrative    Not on file     Social Determinants of Health     Financial Resource Strain: Not on file   Food Insecurity: Not on file   Transportation Needs: Not on file   Physical Activity: Not on file   Stress: Not on file   Social Connections: Not on file   Intimate Partner Violence: Not on file   Housing Stability: Not on file          Family History:  No family history on file. REVIEW OF SYSTEMS:      Constitutional: Denies fever, sweats, weight loss  Eyes: No visual changes or diplopia. No scleral icterus. Ent: No headaches, hearing loss or vertigo. No mouth sores or sore throat. Cardiovascular: No chest pain, dyspnea on exertion, palpitations or loss of consciousness. Respiratory: No cough or wheezing, no sputum production. No hemoptysis. Gastrointestinal: No abdominal pain, appetite loss, blood in stools. No change in bowel habits. Genitourinary: No dysuria, trouble voiding, or hematuria. Musculoskeletal: No generalized weakness. No joint complaints. Integumentary: No rash or pruritus. Neurological: No headache, diplopia. No change in gait, balance, or coordination. No paresthesias. Endocrine: No temperature intolerance. No excessive thirst, fluid intake, urination. Hematologic/lymphatic: No abnormal bruising or ecchymosis, blood clots or swollen lymph nodes. Allergic/immunologic: No nasal congestion or hives.         PHYSICAL EXAM:      Vitals:  Patient Vitals for the past 24 hrs:   BP Temp Temp src Pulse Resp SpO2 Weight   02/02/23 0855 132/81 99 °F (37.2 °C) Oral 95 18 95 % --   02/02/23 0248 -- -- -- -- -- -- 163 lb 2.3 oz (74 kg)   02/01/23 2327 134/81 -- -- -- -- -- --   02/01/23 2322 134/84 98.5 °F (36.9 °C) Oral (!) 101 19 -- --   02/01/23 1958 135/86 98.9 °F (37.2 °C) Oral 95 18 96 % --   02/01/23 1811 -- -- -- (!) 102 -- -- --   02/01/23 1617 (!) 144/92 98.9 °F (37.2 °C) Axillary 100 16 97 % --   02/01/23 1512 (!) 146/91 -- -- 96 -- -- --   02/01/23 1420 -- -- -- 89 -- -- --       Date 02/02/23 0000 - 02/02/23 2359   Shift 7366-1438 5167-4253 8054-4001 24 Hour Total   INTAKE   P.O.(mL/kg/hr) 50(0.1) 240  290   Shift Total(mL/kg) 50(0.7) 240(3.2)  290(3.9)   OUTPUT   Urine(mL/kg/hr) 800(1.4) 725  1525   Emesis/NG output(mL/kg) 0(0)   0(0)   Other(mL/kg) 0(0)   0(0)   Stool(mL/kg) 0(0)   0(0)   Blood(mL/kg) 0(0)   0(0)   Shift Total(mL/kg) 800(10.8) 725(9.8)  1525(20.6)   Weight (kg) 74 74 74 74       CONSTITUTIONAL: awake, alert, cooperative, no apparent distress   EYES: pupils equal, round and reactive to light, sclera clear and conjunctiva normal  ENT: Normocephalic, without obvious abnormality, atraumatic  NECK: supple, symmetrical, no jugular venous distension and no carotid bruits. LIJ CVC   HEMATOLOGIC/LYMPHATIC: no cervical, supraclavicular or axillary lymphadenopathy   LUNGS: no increased work of breathing and clear to auscultation   CARDIOVASCULAR: regular rate and rhythm, normal S1 and S2, no murmur noted  ABDOMEN: normal bowel sounds x 4, soft, non-distended, non-tender, no masses palpated, no hepatosplenomegaly   MUSCULOSKELETAL: full range of motion noted, tone is normal  NEUROLOGIC: awake, alert, oriented to name, place and time. Motor skills grossly intact. SKIN: Normal skin color, texture, turgor and no jaundice.  appears intact, ecchymoses present on RUE  EXTREMITIES: no LE edema       DATA:    PT/INR:    Recent Labs     02/02/23  0600 01/31/23  0418 01/29/23  0356 01/28/23  0640 01/27/23  0745 01/27/23  0538 01/26/23  0358 01/25/23  1258   PROT 5.8* 4. 8* 4.1* 3.9*  --  3.7* 4.2* 4.3*   INR  --   --   --  1.71* 1.82*  --  1.96* 2.03*     PTT:    Recent Labs     01/29/23  0356 01/28/23  2037 01/28/23  1304 01/28/23  0640 01/27/23  2340   APTT 124.2* 119.5* >180.0* 65.5* 59.9*       CMP:    Recent Labs     02/02/23  0600   *   K 3.1*      CO2 27   BUN 9   PROT 5.8*     Mg:    Recent Labs     02/02/23  0600 02/01/23  0522 01/31/23  0418   MG 1.60* 1.70* 1.90       Lab Results   Component Value Date    CALCIUM 8.5 02/02/2023    PHOS 3.1 02/01/2023       CBC:    Recent Labs     02/02/23  0600 02/01/23  1350 02/01/23  0522 01/31/23  0418 01/30/23  1215 01/30/23  0601 01/29/23  0356   WBC 7.8  --  7.2 9.0  --  7.4 10.8   NEUTROABS 5.5  --  5.5 6.9  --  5.5 8.4*   LYMPHOPCT 17.7  --  11.2 13.7  --  14.9 15.8   RBC 2.38*  --  1.93* 2.08*  --  1.69* 2.05*   HGB 8.0* 8.2* 6.7* 7.1* 7.4* 5.7* 7.0*   HCT 23.7* 24.5* 19.9* 21.3* 22.0* 17.5* 21.4*   MCV 99.5  --  103.0* 102.5*  --  103.6* 104.1*   MCH 33.6  --  34.9* 34.3*  --  33.6 34.1*   MCHC 33.7  --  33.9 33.5  --  32.5 32.7   RDW 16.6*  --  15.9* 15.7*  --  15.8* 15.7*     --  176 183  --  198 238        LDH:  Recent Labs     02/01/23  1350   *         Radiology Review:  CT ABDOMEN PELVIS W IV CONTRAST Additional Contrast? None  Narrative: EXAM: CT Abdomen and Pelvis with Contrast    INDICATION: h/o pelvic area abscess/ unexplained anemia on anticougulation    COMPARISON: 1/26/2023    TECHNIQUE: Multiplanar reformatted images of the abdomen and pelvis are provided for review. Up-to-date CT equipment and radiation dose reduction techniques were employed. IV Contrast: 80 mL, Isovue-370  Oral Contrast: None    CT radiation dose optimization techniques (automated exposure control, use of a iterative reconstruction techniques, or adjustment of the mA or KV according to the patients size) were used to limit patient radiation dose. FINDINGS:    Lung bases: Moderate bilateral pleural effusions. Multifocal groundglass consolidative opacities are partially visualized.    Liver: Normal.    Gallbladder and Biliary Tree: The gallbladder is absent.  No intra- or extrahepatic biliary dilatation.    Pancreas: Normal.    Spleen: Normal.    Adrenal Glands: Normal.    Kidneys and Ureters: No obstructing urolithiasis or hydronephrosis. Symmetric renal parenchymal enhancement.    Urinary Bladder: A Joseph catheter decompresses the urinary bladder.    Bowel: Normal diameter, nonobstructed.  Small hiatal hernia is noted. Surgical changes are noted of the proximal stomach.    Reproductive Organs: No associated masses.    Lymph Nodes: No abnormally enlarged nodes.    Peritoneum/Retroperitoneum: There is no retroperitoneal hematoma. Mild anasarca is noted diffusely. Trace dependent ascites.    Vessels: Aorta and IVC without significant abnormality. Splenic vein, SMV, PV and hepatic veins demonstrate enhancement.    Abdominal Wall: Normal.    Bones: Posterior fusion L4-5. Degenerative changes of the endplates L3-4. No acute osseous abnormality. Partially visualized healing/healed fractures of the left seventh through ninth ribs laterally and the right fifth rib anteriorly.    Other Findings: None.  Impression: 1. Evidence for third spacing of fluid with moderate bilateral pleural effusions, trace ascites and anasarca.  2. No retroperitoneal hematoma.  3. Small hiatal hernia.      Problem List  Patient Active Problem List   Diagnosis    Sepsis (HCC)    Delirium    Septic shock (HCC)    Decubitus ulcer of sacral region, stage 4 (HCC)    Atrial fibrillation with rapid ventricular response (HCC)    PAC (premature atrial contraction)    PVC (premature ventricular contraction)    Primary hypertension    Mixed hyperlipidemia       IMPRESSION/RECOMMENDATIONS:    Acute on chronic anemia with macrocytosis  -patient has chronic anemia with hgb being in range of 7-8 during this admission, dropping as low as 5.7. Foate 12.8 and B12 963  yesterday. Haptoglobin wnl 173, though LDH is elevated at 288. Haptoglobin is sensitive marker for hemolysis Reticulocyte count elevated 6.59, immature retic fracture elevated 0.47. No leukopenia or thrombocytopenia.   -he had EGD/colonoscopy performed 8/2019 significant for sigmoid polyp, normal esophagus, small gastric polyps from prior gastric bypass.   -He has required transfusion of 2 units of  PRBC on 1/30 and 2/1.  -CT abd/pelvis to evaluate for RP bleed   -CT abdomen pelvis 2/1/23 no retroperitoneal hematoma  -Liver disease is a consideration through elevation in INR can be from DOAC  -Acute on chronic anemia, worsening possibly related to current infection  -We will monitor the reticulocyte count  -We will continue to follow the patient      Thank you for asking me to see the patient.       Anupam Woodson MD PGY2  Please Contact Through [employer]     Patient will be staffed and discussed with attending physician Dr. Arrieta

## 2023-02-02 NOTE — PROGRESS NOTES
Comprehensive Nutrition Assessment    RECOMMENDATIONS:  PO Diet: Continue regular diet  ONS: Continue Wagner BID; start Ensure Compact BID (Glucerna out of stock)  Encourage free water intake r/t hypernatremia   Nutrition Education: Education not indicated   Monitor nutrition adequacy, pertinent labs, bowel habits, wt changes, and clinical progress    NUTRITION ASSESSMENT:   Nutritional summary & status: Follow-up. Pt w/ poor po intake through adm since diet adv 1/28. EMR shows wound healing ONS intake ~75%. Pt w/ other staff members during attempted encounters. Pt to benefit from ONS to promote kcal/pro intake. BG has been well-controlled, though likely d/t minimal po intake. Will continue w/ regular diet at this time. Previous concern for GI bleed has been r/o. Pt tolerating diet. Will continue to monitor. Admission/PMH: Sepsis, CKD stage III, HTN, T2DM, pancreatitis, gastric  bypass, AMS    MALNUTRITION ASSESSMENT  Context of Malnutrition: Acute Illness   Malnutrition Status:  At risk for malnutrition (Comment)  Findings of the 6 clinical characteristics of malnutrition (Minimum of 2 out of 6 clinical characteristics is required to make the diagnosis of moderate or severe Protein Calorie Malnutrition based on AND/ASPEN Guidelines):  Energy Intake:  Mild decrease in energy intake (Comment)  Weight Loss:  No significant weight loss     Body Fat Loss:  No significant body fat loss     Muscle Mass Loss:  No significant muscle mass loss    Fluid Accumulation:  Moderate to Severe      NUTRITION DIAGNOSIS   Inadequate protein intake related to increase demand for energy/nutrients as evidenced by wounds (Stage IV PI)    Nutrition Monitoring and Evaluation:   Food/Nutrient Intake Outcomes:  Food and Nutrient Intake, Supplement Intake  Physical Signs/Symptoms Outcomes:  Biochemical Data, Nutrition Focused Physical Findings, Weight, Skin, Constipation     OBJECTIVE DATA: Significant to nutrition assessment  Nutrition Related Findings: lbm 2/2, generalized ascara, -11L since adm; Na 147  Wounds: Stage IV, Pressure Injury (Sacrum)  Nutrition Goals: PO intake 75% or greater, prior to discharge     Addi 83; Breakfast, Dinner; Wound Healing Oral Supplement  ADULT DIET; Regular  PO Intake: 26-50%   PO Supplement Intake:Unable to assess  Additional Sources of Calories/IVF:N/A     ANTHROPOMETRICS  Current Height: 6' (182.9 cm)  Current Weight: 163 lb 2.3 oz (74 kg)    Admission weight: 175 lb 11.3 oz (79.7 kg)  Ideal Body Weight (IBW): 178 lbs  (81 kg)    BMI: 22.2    COMPARATIVE STANDARDS  Energy (kcal):  1353-7555 (25-30 kcal/kg CBW)     Protein (g):  120-144 (1.5-1.8 g/kg CBW)       Fluid (mL/day):  1 mL/kcal or per MD    The patient will be monitored per nutrition standards of care. Consult dietitian if additional nutrition interventions are needed prior to RD reassessment.      Terry Patterson [address], [address]  [place]:  [phone]  Office:  [phone]

## 2023-02-02 NOTE — PLAN OF CARE
Problem: Safety - Adult  Goal: Free from fall injury  2/2/2023 0146 by Merry Reynolds RN  Outcome: Progressing  Flowsheets (Taken 2/2/2023 0146)  Free From Fall Injury: Instruct family/caregiver on patient safety  Note: Fall protocol in place      Problem: Skin/Tissue Integrity  Goal: Absence of new skin breakdown  Description: 1. Monitor for areas of redness and/or skin breakdown  2. Assess vascular access sites hourly  3. Every 4-6 hours minimum:  Change oxygen saturation probe site  4. Every 4-6 hours:  If on nasal continuous positive airway pressure, respiratory therapy assess nares and determine need for appliance change or resting period. 2/2/2023 0146 by Merry Reynolds RN  Outcome: Progressing  Note: Daily dressing of the feet wound has been observed  Dressing on the sacral area done within shift  Turning every 2 hourly observed     Problem: Safety - Medical Restraint  Goal: Remains free of injury from restraints (Restraint for Interference with Medical Device)  Description: INTERVENTIONS:  1. Determine that other, less restrictive measures have been tried or would not be effective before applying the restraint  2. Evaluate the patient's condition at the time of restraint application  3. Inform patient/family regarding the reason for restraint  4.  Q2H: Monitor safety, psychosocial status, comfort, nutrition and hydration  2/2/2023 0148 by Merry Reynolds RN  Outcome: Progressing  Flowsheets  Taken 2/2/2023 0148  Remains free of injury from restraints (restraint for interference with medical device):   Determine that other, less restrictive measures have been tried or would not be effective before applying the restraint   Evaluate the patient's condition at the time of restraint application   Inform patient/family regarding the reason for restraint   Every 2 hours: Monitor safety, psychosocial status, comfort, nutrition and hydration  Taken 2/1/2023 2200  Remains free of injury from restraints (restraint for interference with medical device):   Determine that other, less restrictive measures have been tried or would not be effective before applying the restraint   Evaluate the patient's condition at the time of restraint application   Inform patient/family regarding the reason for restraint   Every 2 hours: Monitor safety, psychosocial status, comfort, nutrition and hydration  2/1/2023 1846 by Maye Cerrato RN  Outcome: Progressing  Flowsheets (Taken 2/1/2023 1846)  Remains free of injury from restraints (restraint for interference with medical device):   Determine that other, less restrictive measures have been tried or would not be effective before applying the restraint   Evaluate the patient's condition at the time of restraint application   Inform patient/family regarding the reason for restraint   Every 2 hours: Monitor safety, psychosocial status, comfort, nutrition and hydration  Note: Attempted redirection, disguising equipment, deescalation prior to applying restraint. Pt actively hallucinating this am and later in shift. Partner, Rafael Yoo, informed of restraint and reasons (pt actively pulling nichols, central line, and removing telemetry). Pt aggressive with staff. Restraints removed every 2 hours and monitored. Offered drink and food during each pt interaction. Mentation has waxed and waned throughout shift, with pt actively hallucinating at times.

## 2023-02-02 NOTE — PLAN OF CARE
Problem: Discharge Planning  Goal: Discharge to home or other facility with appropriate resources  Outcome: Progressing  Flowsheets (Taken 1/31/2023 0057 by Alejandro Hinton RN)  Discharge to home or other facility with appropriate resources: Identify barriers to discharge with patient and caregiver     Problem: Pain  Goal: Verbalizes/displays adequate comfort level or baseline comfort level  Outcome: Progressing  Flowsheets (Taken 2/1/2023 1845 by Edith Alford RN)  Verbalizes/displays adequate comfort level or baseline comfort level:   Encourage patient to monitor pain and request assistance   Assess pain using appropriate pain scale   Administer analgesics based on type and severity of pain and evaluate response   Implement non-pharmacological measures as appropriate and evaluate response     Problem: Safety - Adult  Goal: Free from fall injury  Outcome: Progressing  Flowsheets (Taken 2/2/2023 0146 by Marilee Samson RN)  Free From Fall Injury: Instruct family/caregiver on patient safety     Problem: Skin/Tissue Integrity  Goal: Absence of new skin breakdown  Description: 1. Monitor for areas of redness and/or skin breakdown  2. Assess vascular access sites hourly  3. Every 4-6 hours minimum:  Change oxygen saturation probe site  4. Every 4-6 hours:  If on nasal continuous positive airway pressure, respiratory therapy assess nares and determine need for appliance change or resting period. Outcome: Progressing  Note: Wound care completed on sacrum and BLE. Patient tolerated well.       Problem: Chronic Conditions and Co-morbidities  Goal: Patient's chronic conditions and co-morbidity symptoms are monitored and maintained or improved  Outcome: Progressing  Flowsheets (Taken 2/1/2023 1846 by Edith Alford RN)  Care Plan - Patient's Chronic Conditions and Co-Morbidity Symptoms are Monitored and Maintained or Improved:   Monitor and assess patient's chronic conditions and comorbid symptoms for stability, deterioration, or improvement   Collaborate with multidisciplinary team to address chronic and comorbid conditions and prevent exacerbation or deterioration   Update acute care plan with appropriate goals if chronic or comorbid symptoms are exacerbated and prevent overall improvement and discharge     Problem: ABCDS Injury Assessment  Goal: Absence of physical injury  Outcome: Progressing  Flowsheets (Taken 2/1/2023 0101 by Alyssa Cevallos, RN)  Absence of Physical Injury: Implement safety measures based on patient assessment     Problem: Nutrition Deficit:  Goal: Optimize nutritional status  Outcome: Progressing  Flowsheets (Taken 2/2/2023 1818)  Nutrient intake appropriate for improving, restoring, or maintaining nutritional needs:   Assess nutritional status and recommend course of action   Monitor oral intake, labs, and treatment plans     Problem: Safety - Medical Restraint  Goal: Remains free of injury from restraints (Restraint for Interference with Medical Device)  Description: INTERVENTIONS:  1. Determine that other, less restrictive measures have been tried or would not be effective before applying the restraint  2. Evaluate the patient's condition at the time of restraint application  3. Inform patient/family regarding the reason for restraint  4.  Q2H: Monitor safety, psychosocial status, comfort, nutrition and hydration  Outcome: Progressing  Flowsheets (Taken 2/2/2023 0148 by Abdias Delgado, RN)  Remains free of injury from restraints (restraint for interference with medical device):   Determine that other, less restrictive measures have been tried or would not be effective before applying the restraint   Evaluate the patient's condition at the time of restraint application   Inform patient/family regarding the reason for restraint   Every 2 hours: Monitor safety, psychosocial status, comfort, nutrition and hydration     Problem: Neurosensory - Adult  Goal: Achieves stable or improved neurological status  Outcome: Progressing     Problem: Respiratory - Adult  Goal: Achieves optimal ventilation and oxygenation  Outcome: Progressing     Problem: Cardiovascular - Adult  Goal: Maintains optimal cardiac output and hemodynamic stability  Outcome: Progressing     Problem: Skin/Tissue Integrity - Adult  Goal: Skin integrity remains intact  Outcome: Progressing     Problem: Musculoskeletal - Adult  Goal: Return mobility to safest level of function  Outcome: Progressing     Problem: Gastrointestinal - Adult  Goal: Minimal or absence of nausea and vomiting  Outcome: Progressing     Problem: Genitourinary - Adult  Goal: Absence of urinary retention  Outcome: Progressing     Problem: Infection - Adult  Goal: Absence of infection at discharge  Outcome: Progressing     Problem: Metabolic/Fluid and Electrolytes - Adult  Goal: Electrolytes maintained within normal limits  Outcome: Progressing     Problem: Hematologic - Adult  Goal: Maintains hematologic stability  Outcome: Progressing

## 2023-02-02 NOTE — PROGRESS NOTES
CC: AF RVR   HPI:     S: Alert, calm and cooperative. Family at bedside. No c/o cp or sob or palpitations     Tele: Sinus PVC, PAC     O:  Physical Exam:  /81   Pulse 95   Temp 99 °F (37.2 °C) (Oral)   Resp 18   Ht 6' (1.829 m)   Wt 163 lb 2.3 oz (74 kg)   SpO2 95%   BMI 22.13 kg/m²    General (appearance):   no acute distress   Eyes: anicteric   Neck: soft, No JVD  Ears/Nose/Mouth/Thorat: No cyanosis  CV: Reg, frequent ectopy   Respiratory:  Diminished, normal effort  GI: soft, non-tender, non-distended  Skin: Warm, dry. No rashes. ACE wraps and shyam boots to both feet   Neuro/Psych:  Alert, calm and cooperative  Ext:  No c/c. No edema   Pulses:  2+ radial     I.O's= -9 L     Weight  Admission: Weight: 175 lb 11.3 oz (79.7 kg)   Today: Weight: 163 lb 2.3 oz (74 kg)    CBC:   Recent Labs     23  0522 23  1350 23  0600   WBC 9.0 7.2  --  7.8   HGB 7.1* 6.7* 8.2* 8.0*   HCT 21.3* 19.9* 24.5* 23.7*   .5* 103.0*  --  99.5    176  --  189     BMP:   Recent Labs     23  0522 23  0600    145 147*   K 3.2* 3.4* 3.1*    110 109   CO2 27 26 27   PHOS  --  3.1  --    BUN 11 9 9   CREATININE 0.7* 0.6* 0.6*     Estimated Creatinine Clearance: 134 mL/min (A) (based on SCr of 0.6 mg/dL (L)). Mag:   Lab Results   Component Value Date/Time    MG 1.60 2023 06:00 AM     LIVER PROFILE:   Recent Labs     23  0600   AST 38* 24   ALT 34 24   BILIDIR 0.4* 0.4*   BILITOT 0.8 1.0   ALKPHOS 179* 122     PT/INR: No results for input(s): PROTIME, INR in the last 72 hours. Pro-BNP:   Lab Results   Component Value Date/Time    PROBNP 520 2023 04:19 PM     CARDIAC ENZYMES: No results for input(s): CKTOTAL, CKMB, TROPONINI in the last 72 hours. Imagin2023 Echo   Technically difficult examination. Ejection fraction is visually estimated to be 60-65 %. Normal right ventricular size and function. Moderate circumferential pericardial effusion with prominent pericardial fat. No tamponade physiology. Assessment:  Delirium   AF RVR  PAC/PVC  Severe anemia  Decubitus ulcer  DM  HLD  HTN  Hx CVA  Sepsis    Plan:  -Keep K>4, Mg>2. K replaced   -Toprol 25 mg po increased to  bid. Hold for SBP< 100 mmHg or HR <60 bpm  -Lasix 20 mg po daily   -Diltiazem  mg po daily  -No A/C d/t Severe anemia

## 2023-02-02 NOTE — PROGRESS NOTES
HOSPITALISTS PROGRESS NOTE    2/2/2023 12:44 PM        Name: Fouzia Xavier . Admitted: 1/25/2023  Primary Care Provider: Mel Pompa MD (Tel: [phone])      Problem List  Principal Problem:    Sepsis [facility])  Active Problems:    Delirium    Septic shock ([facility] [address]    Decubitus ulcer of sacral region, stage 4 ([facility] [address]    Atrial fibrillation with rapid ventricular response (HCC)    PAC (premature atrial contraction)    PVC (premature ventricular contraction)    Primary hypertension    Mixed hyperlipidemia  Resolved Problems:    * No resolved hospital problems.  *       Assessment & Plan:   Anemia with microcytosis  No obvious gi bleeding, no evidence of bleeding on CT scan, rule out hemolytic anemia, check folate B12 PRBCs to keep hemoglobin above 7  Haptoglobin is normal, LDH is minimally elevated, reticulocyte count is slightly elevated, get hematology input regarding anemia    Natremia  Free water    Hypokalemia and hypomagnesemia  Replace potassium and magnesium    Anasarca with poor p.o. intake  On IV albumin, follow-up on protein creatinine ratio,  Looking at the chart it does appear that patient does has chronic anasarca, discussed with patient's  who also acknowledged that PCP notes also reviewed about that, it does appear it might be related to chronic malabsorption related to his history of gastric bypass    History of gastric bypass surgery 2004      Recent PE in December  Patient is on heparin drip, no active bleeding noticed continue on heparin drip    Atrial fibrillation with RVR  On heparin drip cardiology following on Toprol and Cardizem, on heparin drip    Episodes of agitation with concerns of delirium CT head on 125 negative for any acute abnormality  Does has a history of depression  On patient is on Sinequan and clonazepam both are ordered over here, confirm with the     Decubitus ulcer stage IV  Pseudomonas, antibiotic changed to cefepime from Rocephin, procalcitonin on admission was about 42 which has come down to 2.86 now    IV Access: Peripheral  Joseph:  Diet: ADULT ORAL NUTRITION SUPPLEMENT; Breakfast, Dinner; Wound Healing Oral Supplement  ADULT DIET; Regular  Code:Full Code  DVT PPX Heparin drip  Disposition monitor in the hospital      Brief Course: Patient is admitted on 58[facility] with the metabolic encephalopathy severe sepsis complicated UTI ALISON he had elevated LFTs on admission also had a history of gastric bypass surgery also found to have sacral decubitus ulcer stage IV      CC: Confusion and agitation    Subjective:  . Patient mentation is improved as compared to yesterday but still get confused,  is at bedside, discussed with the  in detail,  does mention that patient has a history of depression but did not had any history of psychotic behaviors in the past, patient does has a history of generalized edema of body in the past, patient is making good urine  Patient was admitted at [facility] for 7 days at the end of December did had a pulmonary embolism at that time did also had coag negative bacteremia which was thought to be contaminant patient was given p.o.  Bactrim on discharge,  Reviewed interval ancillary notes    Current Medications  magnesium sulfate 2000 mg in 50 mL IVPB premix, PRN  multivitamin 1 tablet, Daily  metoprolol succinate (TOPROL XL) extended release tablet 25 mg, BID  potassium chloride (KLOR-CON M) extended release tablet 40 mEq, Once  cefepime (MAXIPIME) 2,000 mg in sodium chloride 0.9 % 50 mL IVPB (mini-bag), Q12H  dilTIAZem (CARDIZEM CD) extended release capsule 240 mg, Daily  furosemide (LASIX) tablet 20 mg, Daily  LORazepam (ATIVAN) injection 0.5 mg, Q6H PRN  0.9 % sodium chloride infusion, PRN  collagenase ointment, Daily  albumin human 25 % IV solution 25 g, Q8H  doxepin (SINEQUAN) capsule 150 mg, Nightly  thiamine (B-1) injection 100 mg, Daily  melatonin tablet 3 mg, Nightly  Venelex ointment, BID  lactulose (CHRONULAC) 10 GM/15ML solution 10 g, TID  collagenase ointment, Daily  prochlorperazine (COMPAZINE) injection 10 mg, Q6H PRN   Or  prochlorperazine (COMPAZINE) tablet 10 mg, Q6H PRN  silver nitrate applicators applicator, Once  heparin (porcine) injection 6,400 Units, PRN  heparin (porcine) injection 3,200 Units, PRN  heparin 25,000 units in dextrose 5% 250 mL (premix) infusion, Continuous  clonazePAM (KLONOPIN) tablet 1 mg, BID PRN  perflutren lipid microspheres (DEFINITY) injection 1.5 mL, ONCE PRN  sodium chloride flush 0.9 % injection 5-40 mL, 2 times per day  sodium chloride flush 0.9 % injection 5-40 mL, PRN  0.9 % sodium chloride infusion, PRN  polyethylene glycol (GLYCOLAX) packet 17 g, Daily PRN  acetaminophen (TYLENOL) tablet 650 mg, Q6H PRN   Or  acetaminophen (TYLENOL) suppository 650 mg, Q6H PRN  famotidine (PEPCID) 20 mg in sodium chloride (PF) 0.9 % 10 mL injection, Daily      Objective:  /81   Pulse 95   Temp 99 °F (37.2 °C) (Oral)   Resp 18   Ht 6' (1.829 m)   Wt 163 lb 2.3 oz (74 kg)   SpO2 95%   BMI 22.13 kg/m²     Intake/Output Summary (Last 24 hours) at 2/2/2023 1244  Last data filed at 2/2/2023 0929  Gross per 24 hour   Intake 570 ml   Output 4725 ml   Net -4155 ml      Wt Readings from Last 3 Encounters:   02/02/23 163 lb 2.3 oz (74 kg)   08/09/19 228 lb (103.4 kg)   03/17/17 231 lb (104.8 kg)       Patient is alert awake oriented x2 today  Patient is following commands  Generalized and anasarca is significantly improving  S1-S2 heard  Soft nontender nondistended belly  Labs and Tests:  CBC:   Recent Labs     01/31/23  0418 02/01/23  0522 02/01/23  1350 02/02/23  0600   WBC 9.0 7.2  --  7.8   HGB 7.1* 6.7* 8.2* 8.0*    176  --  189     BMP:    Recent Labs     01/31/23  0418 02/01/23  0522 02/02/23  0600    145 147*   K 3.2* 3.4* 3.1*    110 109   CO2 27 26 27   BUN 11 9 9   CREATININE 0.7* 0.6* 0.6*   GLUCOSE 79 79 79     Hepatic:   Recent Labs     01/31/23  0418 02/02/23  0600   AST 38* 24   ALT 34 24   BILITOT 0.8 1.0   ALKPHOS 179* 122     CT ABDOMEN PELVIS W IV CONTRAST Additional Contrast? None   Final Result   1. Evidence for third spacing of fluid with moderate bilateral pleural effusions, trace ascites and anasarca. 2. No retroperitoneal hematoma. 3. Small hiatal hernia. US DUP ABD PEL RETRO SCROT COMPLETE   Final Result      1. Patent portal veins, hepatic artery and hepatic veins. XR FOOT LEFT (MIN 3 VIEWS)   Final Result      1. No definite evidence of active osteomyelitis. XR FOOT RIGHT (MIN 3 VIEWS)   Final Result      1. No definite evidence of active osteomyelitis. XR CHEST PORTABLE   Final Result         1. Good position of left IJ central venous catheter in the lower SVC   2. No pneumothorax   3. Bilateral pleural effusions and left greater than right lung airspace disease             POC US ECHOCARDIO TRANSTHORACIC LTD   Final Result      CT Head W/O Contrast   Final Result      No evidence of acute intracranial abnormality. XR CHEST PORTABLE   Final Result   1. Abnormal airspace disease throughout the left hemithorax.    2. Follow-up is recommended with specific attention to obtaining a upright PA and lateral chest                Mayra Pappas MD   2/2/2023 12:44 PM

## 2023-02-03 LAB
ANAEROBIC CULTURE: ABNORMAL
ANION GAP SERPL CALCULATED.3IONS-SCNC: 8 MMOL/L (ref 3–16)
ANTI-XA UNFRAC HEPARIN: 0.46 IU/ML (ref 0.3–0.7)
BASOPHILS ABSOLUTE: 0 K/UL (ref 0–0.2)
BASOPHILS RELATIVE PERCENT: 0.2 %
BUN BLDV-MCNC: 9 MG/DL (ref 7–20)
CALCIUM SERPL-MCNC: 8.6 MG/DL (ref 8.3–10.6)
CHLORIDE BLD-SCNC: 106 MMOL/L (ref 99–110)
CO2: 27 MMOL/L (ref 21–32)
CREAT SERPL-MCNC: 0.6 MG/DL (ref 0.8–1.3)
EOSINOPHILS ABSOLUTE: 0 K/UL (ref 0–0.6)
EOSINOPHILS RELATIVE PERCENT: 0.3 %
GFR SERPL CREATININE-BSD FRML MDRD: >60 ML/MIN/{1.73_M2}
GLUCOSE BLD-MCNC: 88 MG/DL (ref 70–99)
GRAM STAIN RESULT: ABNORMAL
HCT VFR BLD CALC: 26.6 % (ref 40.5–52.5)
HEMOGLOBIN: 8.8 G/DL (ref 13.5–17.5)
IMMATURE RETIC FRACT: 0.56 (ref 0.21–0.37)
LYMPHOCYTES ABSOLUTE: 1.3 K/UL (ref 1–5.1)
LYMPHOCYTES RELATIVE PERCENT: 19.7 %
MAGNESIUM: 1.7 MG/DL (ref 1.8–2.4)
MCH RBC QN AUTO: 33.7 PG (ref 26–34)
MCHC RBC AUTO-ENTMCNC: 33 G/DL (ref 31–36)
MCV RBC AUTO: 102 FL (ref 80–100)
MONOCYTES ABSOLUTE: 0.7 K/UL (ref 0–1.3)
MONOCYTES RELATIVE PERCENT: 9.9 %
NEUTROPHILS ABSOLUTE: 4.8 K/UL (ref 1.7–7.7)
NEUTROPHILS RELATIVE PERCENT: 69.9 %
ORGANISM: ABNORMAL
PDW BLD-RTO: 17.8 % (ref 12.4–15.4)
PLATELET # BLD: 211 K/UL (ref 135–450)
PMV BLD AUTO: 7.4 FL (ref 5–10.5)
POTASSIUM REFLEX MAGNESIUM: 3.6 MMOL/L (ref 3.5–5.1)
RBC # BLD: 2.61 M/UL (ref 4.2–5.9)
RETICULOCYTE ABSOLUTE COUNT: 0.1 M/UL
RETICULOCYTE COUNT PCT: 3.96 % (ref 0.5–2.18)
SODIUM BLD-SCNC: 141 MMOL/L (ref 136–145)
WBC # BLD: 6.8 K/UL (ref 4–11)
WOUND/ABSCESS: ABNORMAL
WOUND/ABSCESS: ABNORMAL

## 2023-02-03 PROCEDURE — P9047 ALBUMIN (HUMAN), 25%, 50ML: HCPCS | Performed by: INTERNAL MEDICINE

## 2023-02-03 PROCEDURE — 6370000000 HC RX 637 (ALT 250 FOR IP): Performed by: NURSE PRACTITIONER

## 2023-02-03 PROCEDURE — 6360000002 HC RX W HCPCS: Performed by: INTERNAL MEDICINE

## 2023-02-03 PROCEDURE — 2580000003 HC RX 258: Performed by: STUDENT IN AN ORGANIZED HEALTH CARE EDUCATION/TRAINING PROGRAM

## 2023-02-03 PROCEDURE — 97535 SELF CARE MNGMENT TRAINING: CPT

## 2023-02-03 PROCEDURE — 6370000000 HC RX 637 (ALT 250 FOR IP): Performed by: INTERNAL MEDICINE

## 2023-02-03 PROCEDURE — 85025 COMPLETE CBC W/AUTO DIFF WBC: CPT

## 2023-02-03 PROCEDURE — 80048 BASIC METABOLIC PNL TOTAL CA: CPT

## 2023-02-03 PROCEDURE — 83735 ASSAY OF MAGNESIUM: CPT

## 2023-02-03 PROCEDURE — 97530 THERAPEUTIC ACTIVITIES: CPT

## 2023-02-03 PROCEDURE — 2580000003 HC RX 258: Performed by: INTERNAL MEDICINE

## 2023-02-03 PROCEDURE — 99233 SBSQ HOSP IP/OBS HIGH 50: CPT | Performed by: NURSE PRACTITIONER

## 2023-02-03 PROCEDURE — 85520 HEPARIN ASSAY: CPT

## 2023-02-03 PROCEDURE — 2500000003 HC RX 250 WO HCPCS: Performed by: STUDENT IN AN ORGANIZED HEALTH CARE EDUCATION/TRAINING PROGRAM

## 2023-02-03 PROCEDURE — 6370000000 HC RX 637 (ALT 250 FOR IP): Performed by: STUDENT IN AN ORGANIZED HEALTH CARE EDUCATION/TRAINING PROGRAM

## 2023-02-03 PROCEDURE — 2060000000 HC ICU INTERMEDIATE R&B

## 2023-02-03 PROCEDURE — 85045 AUTOMATED RETICULOCYTE COUNT: CPT

## 2023-02-03 RX ORDER — PANTOPRAZOLE SODIUM 40 MG/1
40 TABLET, DELAYED RELEASE ORAL
Status: DISCONTINUED | OUTPATIENT
Start: 2023-02-04 | End: 2023-02-04 | Stop reason: HOSPADM

## 2023-02-03 RX ORDER — QUETIAPINE FUMARATE 200 MG/1
200 TABLET, FILM COATED ORAL NIGHTLY
Status: DISCONTINUED | OUTPATIENT
Start: 2023-02-03 | End: 2023-02-04 | Stop reason: HOSPADM

## 2023-02-03 RX ORDER — MAGNESIUM SULFATE 1 G/100ML
1000 INJECTION INTRAVENOUS ONCE
Status: COMPLETED | OUTPATIENT
Start: 2023-02-03 | End: 2023-02-03

## 2023-02-03 RX ADMIN — ACETAMINOPHEN 650 MG: 325 TABLET, FILM COATED ORAL at 10:11

## 2023-02-03 RX ADMIN — METOPROLOL SUCCINATE 25 MG: 25 TABLET, EXTENDED RELEASE ORAL at 20:16

## 2023-02-03 RX ADMIN — DILTIAZEM HYDROCHLORIDE 240 MG: 240 CAPSULE, COATED, EXTENDED RELEASE ORAL at 08:59

## 2023-02-03 RX ADMIN — ZINC SULFATE 220 MG (50 MG) CAPSULE 50 MG: CAPSULE at 08:59

## 2023-02-03 RX ADMIN — FUROSEMIDE 20 MG: 20 TABLET ORAL at 08:59

## 2023-02-03 RX ADMIN — SODIUM CHLORIDE, PRESERVATIVE FREE 10 ML: 5 INJECTION INTRAVENOUS at 09:00

## 2023-02-03 RX ADMIN — APIXABAN 5 MG: 5 TABLET, FILM COATED ORAL at 20:16

## 2023-02-03 RX ADMIN — Medication 3 MG: at 20:16

## 2023-02-03 RX ADMIN — ALBUMIN (HUMAN) 25 G: 0.25 INJECTION, SOLUTION INTRAVENOUS at 09:08

## 2023-02-03 RX ADMIN — LACTULOSE 10 G: 10 SOLUTION ORAL at 13:58

## 2023-02-03 RX ADMIN — METOPROLOL SUCCINATE 25 MG: 25 TABLET, EXTENDED RELEASE ORAL at 08:59

## 2023-02-03 RX ADMIN — LACTULOSE 10 G: 10 SOLUTION ORAL at 08:58

## 2023-02-03 RX ADMIN — SODIUM CHLORIDE 25 ML: 9 INJECTION, SOLUTION INTRAVENOUS at 09:16

## 2023-02-03 RX ADMIN — Medication: at 09:02

## 2023-02-03 RX ADMIN — APIXABAN 5 MG: 5 TABLET, FILM COATED ORAL at 10:11

## 2023-02-03 RX ADMIN — THERA TABS 1 TABLET: TAB at 09:03

## 2023-02-03 RX ADMIN — COLLAGENASE SANTYL: 250 OINTMENT TOPICAL at 09:02

## 2023-02-03 RX ADMIN — LACTULOSE 10 G: 10 SOLUTION ORAL at 20:17

## 2023-02-03 RX ADMIN — QUETIAPINE FUMARATE 200 MG: 200 TABLET ORAL at 20:17

## 2023-02-03 RX ADMIN — SODIUM CHLORIDE 25 ML: 9 INJECTION, SOLUTION INTRAVENOUS at 12:08

## 2023-02-03 RX ADMIN — MAGNESIUM SULFATE HEPTAHYDRATE 1000 MG: 1 INJECTION, SOLUTION INTRAVENOUS at 12:10

## 2023-02-03 RX ADMIN — CEFEPIME HYDROCHLORIDE 2000 MG: 2 INJECTION, POWDER, FOR SOLUTION INTRAMUSCULAR; INTRAVENOUS at 09:16

## 2023-02-03 RX ADMIN — SODIUM CHLORIDE, PRESERVATIVE FREE 20 MG: 5 INJECTION INTRAVENOUS at 08:59

## 2023-02-03 RX ADMIN — DOXEPIN HYDROCHLORIDE 150 MG: 25 CAPSULE ORAL at 20:14

## 2023-02-03 RX ADMIN — Medication: at 20:17

## 2023-02-03 RX ADMIN — SODIUM CHLORIDE, PRESERVATIVE FREE 10 ML: 5 INJECTION INTRAVENOUS at 20:18

## 2023-02-03 ASSESSMENT — PAIN SCALES - GENERAL
PAINLEVEL_OUTOF10: 7
PAINLEVEL_OUTOF10: 0
PAINLEVEL_OUTOF10: 3

## 2023-02-03 ASSESSMENT — PAIN DESCRIPTION - DESCRIPTORS
DESCRIPTORS: ACHING
DESCRIPTORS: ACHING

## 2023-02-03 ASSESSMENT — PAIN - FUNCTIONAL ASSESSMENT: PAIN_FUNCTIONAL_ASSESSMENT: ACTIVITIES ARE NOT PREVENTED

## 2023-02-03 ASSESSMENT — PAIN DESCRIPTION - ORIENTATION
ORIENTATION: MID
ORIENTATION: MID

## 2023-02-03 ASSESSMENT — PAIN DESCRIPTION - ONSET: ONSET: ON-GOING

## 2023-02-03 ASSESSMENT — PAIN DESCRIPTION - PAIN TYPE: TYPE: CHRONIC PAIN

## 2023-02-03 ASSESSMENT — PAIN DESCRIPTION - LOCATION
LOCATION: BACK
LOCATION: BACK

## 2023-02-03 ASSESSMENT — PAIN DESCRIPTION - FREQUENCY: FREQUENCY: CONTINUOUS

## 2023-02-03 NOTE — PROGRESS NOTES
Podiatric Surgery Daily Progress Note  Phi Flannery      Subjective :   Patient seen and examined this am at the bedside. Unable to obtain a current HPI from patient due to current mentation. Review of Systems: Unable to be obtained due to patients current mentation      Objective     /86   Pulse 88   Temp 97.8 °F (36.6 °C) (Oral)   Resp 18   Ht 6' (1.829 m)   Wt 155 lb 6.8 oz (70.5 kg)   SpO2 98%   BMI 21.08 kg/m²      I/O:  Intake/Output Summary (Last 24 hours) at 2/3/2023 0602  Last data filed at 2/3/2023 0446  Gross per 24 hour   Intake 290 ml   Output 3350 ml   Net -3060 ml            Wt Readings from Last 3 Encounters:   02/03/23 155 lb 6.8 oz (70.5 kg)   08/09/19 228 lb (103.4 kg)   03/17/17 231 lb (104.8 kg)     LABS:    Recent Labs     02/01/23  0522 02/01/23  1350 02/02/23  0600   WBC 7.2  --  7.8   HGB 6.7* 8.2* 8.0*   HCT 19.9* 24.5* 23.7*     --  189        Recent Labs     02/01/23  0522 02/02/23  0600    147*   K 3.4* 3.1*    109   CO2 26 27   PHOS 3.1  --    BUN 9 9   CREATININE 0.6* 0.6*        Recent Labs     02/02/23  0600   PROT 5.8*         LOWER EXTREMITY EXAMINATION    Dressing to bilateral LE intact. No strikethrough noted to the external dressing. No drainage noted to the internal layers of the dressing. VASCULAR: DP and PT pulses faintly palpable 1/4 bilateral.  Upon hand-held Doppler examination, DP and PT signals biphasic bilateral. CFT is brisk to the digits of the foot b/l. Skin temperature is warm to cool from proximal to distal with no focal calor noted. Scant nonpitting edema noted bilateral. No pain with calf compression b/l. NEUROLOGIC: Unable to be obtained due to patients current mentation. DERMATOLOGIC:   Right lower extremity has a full-thickness ulceration noted to the medial aspect of the first metatarsophalangeal joint with overlying devitalized tissue. Deep rubor noted to wound base.   No erythema, crepitance, fluctuance, drainage, or malodor noted. Nonblanchable rubor noted to posterior heel. No open wound noted at this time. No erythema, crepitance, fluctuance, drainage, or malodor noted. Patient provided verbal consent for today's photos. Left lower extremity has a full-thickness ulceration noted to the lateral aspect of the fifth metatarsal head. Wound base necrotic with a well adhered eschar and a hyperkeratotic periwound. No fluctuance, crepitus, erythema, drainage, or malodor noted. Wound does not probe to bone, tunnel, or track. Nonblanchable rubor noted to the posterior heel. No open wound noted. No fluctuance, crepitus, erythema, drainage, or malodor noted. MUSCULOSKELETAL: Muscle strength deferred. Tenderness with palpation of the periwound areas bilateral feet. Ankle joint ROM is decreased in dorsiflexion with the knee extended. No obvious biomechanical abnormalities. IMAGING:    X-ray left foot 1/26/2023  Narrative   FINDINGS:       Deformity of the first toe with fusion of the interphalangeal joint, the previously demonstrated screw, and deformity of the head of the first metatarsal and arthrosis. Deformity of the second toe also present with arthrosis of the second    metatarsophalangeal joint and resorption of the distal aspect of the proximal phalanx. Dressing overlies the foot laterally. No definite erosion or periosteal reaction to suggest osteomyelitis. Soft tissue swelling present. Moderate-sized plantar calcaneal bone spur. Impression   1. No definite evidence of active osteomyelitis. X-ray right foot 1/26/2023  Narrative   FINDINGS:       Scoliosis fixed the interphalangeal joint of the first toe. Mild arthrosis of the first metatarsophalangeal joint. Mild adjacent soft tissue swelling. No definite evidence of active osteomyelitis. Plantar calcaneal bone spur. Impression   1. No definite evidence of active osteomyelitis.      ASSESSMENT/PLAN  Diabetic foot ulceration, right medial first MPJ, Cassidy 2  Diabetic foot ulceration, left lateral fifth metatarsal, Cassidy 2  Deep tissue pressure injury, bilateral heels  Diabetes mellitus type 2 with peripheral neuropathy     -Patient examined and evaluated at the bedside   -Hypertensive otherwise VSS. No Leukocytosis noted (WBC 6.8)  -ESR <1, .1  -Prealbumin 3.7; ordered wound healing nutritional supplement  -X-rays reviewed, noted above  -Blood culture 1 (1/25/2023): Streptococcus  -Dressing applied to right lower extremity consisting of Betadine soaked gauze, dry gauze, Kerlix, and loose Ace bandage  -Dressing applied to left lower extremity consisting of Santyl, saline soaked gauze, dry gauze, Kerlix, and loose Ace bandage  -Antibiotics not indicated from podiatric standpoint at this time  -Weightbearing as tolerated bilateral lower extremity  -Podiatry will follow patient peripherally. Nursing communication placed for daily dressing changes to bilateral lower extremity as of above. DISPO: Diabetic foot ulcerations bilateral; stable. No antibiotics indicated from podiatric standpoint. Podiatry will continue to follow patient peripherally. Nursing communication placed for daily dressing changes to bilateral lower extremity.       Patient assessment and plan discussed with Dr. Kobe Trejo DPM.    Keira Goodrich DPM  Podiatric Resident, PGY-2  Pager #: [phone] or Primo Serve

## 2023-02-03 NOTE — CARE COORDINATION
Case Management Assessment  Initial Evaluation    Date/Time of Evaluation: 2/3/2023 5:54 PM  Assessment Completed by: Krista Caldera    If patient is discharged prior to next notation, then this note serves as note for discharge by case management. Patient Name: Leonila Sanchez                   YOB: 1960  Diagnosis: Delirium [R41.0]  Sepsis ([facility] [address] [A41.9]  Urinary tract infection without hematuria, site unspecified [N39.0]  Sepsis with encephalopathy without septic shock, due to unspecified organism ([facility] [address] [A41.9, R65.20, G93.40]                   Date / Time: 1/25/2023 12:33 PM    Patient Admission Status: Inpatient   Readmission Risk (Low < 19, Mod (19-27), High > 27): Readmission Risk Score: 17.6    Current PCP: Sariah Dang MD  PCP verified by CM? Yes    Chart Reviewed: Yes      History Provided by: Significant Other, Medical Record  Patient Orientation: Alert and Oriented, Person    Patient Cognition: Alert    Hospitalization in the last 30 days (Readmission):  No    If yes, Readmission Assessment in  Navigator will be completed.     Advance Directives:      Code Status: Full Code   Patient's Primary Decision Maker is: Named in [address] [place] [address]    Primary Decision MakerCorinda Gisela Spouse - [phone]    Discharge Planning:    Patient lives with: Spouse/Significant Other Type of Home: House  Primary Care Giver: Spouse  Patient Support Systems include: Spouse/Significant Other   Current Financial resources: Medicaid  Current community resources: None  Current services prior to admission: Durable Medical Equipment            Current DME: Yesica Manifold, Wheelchair, Shower Chair            Type of Home Care services:  OT, PT, Nursing Services    ADLS  Prior functional level: Assistance with the following:, Cooking, Housework, Shopping, Mobility  Current functional level: Assistance with the following:, Bathing, Dressing, Toileting, Cooking, Housework, Shopping, Mobility    PT AM-PAC: 9 /24  OT AM-PAC: 10 /24    Family can provide assistance at DC: Yes  Would you like Case Management to discuss the discharge plan with any other family members/significant others, and if so, who? No  Plans to Return to Present Housing: No  Other Identified Issues/Barriers to RETURNING to current housing: debilitated, st 4 sacral ulcer  Potential Assistance needed at discharge: [address], [address]            Potential DME: Hospital Bed  Patient expects to discharge to: [facility] for transportation at discharge:      Financial    Payor: [employer] [address] / Plan: Kacy Pouch / Product Type: *No Product type* /     Does insurance require precert for SNF: Yes    Potential assistance Purchasing Medications: No  Meds-to-Beds request: Yes    No Pharmacies Listed    Notes:    Factors facilitating achievement of predicted outcomes: Family support    Barriers to discharge: Confusion, Depression, Limited insight into deficits, Unrealistic expectations, Decreased endurance, Lower extremity weakness, Medical complications, and Wound Care    Additional Case Management Notes:     CM met with  at bedside this AM while pt was off unit and discussed home life prior to hospital admission. Marry Foley states pt is primarily quite independent at baseline, states they have all necessary DME: shower chair, wheelchair, walker, and grab bars in bathroom. States pt is active with [address] [place] [address] Ave through [facility]. Hans states DCP will be to take pt back home upon DC without HHC. CM [place] [facility] Hans declined, stating the pt does not like strangers in the house.       The Plan for Transition of Care is related to the following treatment goals of Delirium [R41.0]  Sepsis ([address] [A41.9]  Urinary tract infection without hematuria, site unspecified [N39.0]  Sepsis with encephalopathy without septic shock, due to unspecified organism ([address] [A41.9, R65.20, O33.45]    IF APPLICABLE: The Patient and/or patient representative Claudine Lindo and his family were provided with a choice of provider and agrees with the discharge plan. Freedom of choice list with basic dialogue that supports the patient's individualized plan of care/goals and shares the quality data associated with the providers was provided to: Patient   Patient Representative Name:       The Patient and/or Patient Representative Agree with the Discharge Plan?  Yes    Esau Merchant  Case Management Department  Ph: [phone]

## 2023-02-03 NOTE — CONSULTS
Psychiatry Initial Consultation    Patient Name: Shraddha Haile  MRN: [id number]  Admission Date: 1/25/2023    Reason for Consult: ?? Persistent delirium h/o depression. ?? Underlying psych diagnosis can not rule out    HPI:   Shraddha Haile is a 58 y.o. male who presented to the hospital on 01/25/2023 with a chief complaint of altered mental status. Per ED documentation, EMS reports that the patient was last known normal at 11 PM last night which is approximately 13 hours ago. EMS was called to his home because he was altered this morning when the person in the house woke up and found him in this condition. The patient cannot contribute any history. He was found to have multiple medical issues including septic shock, Afib with RVR, and a decubitus ulcer of sacral region, stage 4. Met with Denise Morales;  Stacy Mclean at bedside. Deniseleola BoyceCarmen was oriented to month and year but reports that we are currently in a commercial building, disoriented to situation. When asked why is in the hospital noted that \"there has been loss in my family\".  elaborated on this, noting that Denise Morales has been bringing up the past a lot during this hospitalization and patient's brother was going through a divorce and killed his significant other and them himself around 2013. During this hospitalization Denise Morales has exhibited intermittent confusion and worsening memory. No issues with confusion or memory prior to admission. Patient does have a long-standing history of depression and is established with an outpatient psychiatrist through . Both report that depression has been stable. Denise Morales denies experiencing hallucinations, however, his  reports that he has been intermittently seeing bugs; last occurrence a day or two ago. He is prescribed Seroquel in addition to his Doxepin and Klonopin, however, the Seroquel is not currently ordered.  Care Everywhere indicates that he was on up to 200 mg HS in the past but recent/current dose unknown;  feels that he is oversedated on whatever his current dose is. Call placed to [address] [place] [address] location to confirm psychiatric medication doses. Currently ordered Doxepin 150 mg, take 1-2 capsules at bedtime; Seroquel 400 mg HS. [facility] checked at [facility] 11/30/22 - WNL. Duration: 8-9 days   Severity: Moderate  Context: Stress, medical issues   Associated Symptoms: As above    Past Psychiatric History:    Previous Diagnoses: Depression, anxiety  Previous Hospitalizations: Denies  Outpatient Treatment: Established with outpatient psychiatrist through [facility]  Past Medication Trials: Abilify, Buspar, Doxepin, Klonopin, Prazosin, Prozac, Seroquel  Suicidality: Denies SI, denies previous suicide attempts     Past Medical History:  Past Medical History:   Diagnosis Date    Bradycardia     Depression     Diabetes mellitus ([facility] [address]     no meds    Hyperlipidemia     Hypertension     Osteoarthritis of lumbar spine     Primary osteoarthritis of lumbar spine     Unspecified cerebral artery occlusion with cerebral infarction 2003    TIA    Unspecified cerebral artery occlusion with cerebral infarction      Home Medications:  Prior to Admission medications    Medication Sig Start Date End Date Taking? Authorizing Provider   apixaban (ELIQUIS) 5 MG TABS tablet Take 5 mg by mouth 2 times daily   Yes Historical Provider, MD   clonazePAM (KLONOPIN) 1 MG tablet Take 1 mg by mouth 2 times daily as needed.    Yes Historical Provider, MD   doxepin (SINEQUAN) 50 MG capsule Take 150 mg by mouth nightly   Yes Historical Provider, MD   furosemide (LASIX) 20 MG tablet Take 20 mg by mouth daily 1/4/23  Yes Historical Provider, MD   latanoprost (XALATAN) 0.005 % ophthalmic solution Place 1 drop into both eyes nightly  Patient not taking: Reported on 1/26/2023 11/6/22  Yes Historical Provider, MD   pantoprazole (PROTONIX) 40 MG tablet Take 40 mg by mouth daily   Yes Historical Provider, MD   tiZANidine (ZANAFLEX) 4 MG tablet Take 4 mg by mouth every 8 hours as needed (muscle spasms)   Yes Historical Provider, MD   tamsulosin (FLOMAX) 0.4 MG capsule Take 0.4 mg by mouth 2 times daily    Historical Provider, MD   losartan (COZAAR) 50 MG tablet Take 50 mg by mouth daily. Historical Provider, MD   senna (SENOKOT) 8.6 MG tablet Take 1 tablet by mouth as needed for Constipation. Patient not taking: Reported on 1/26/2023    Historical Provider, MD   docusate sodium (COLACE) 100 MG capsule Take 100 mg by mouth. Patient not taking: Reported on 1/26/2023    Historical Provider, MD   gabapentin (NEURONTIN) 800 MG tablet Take 800 mg by mouth 3 times daily. Historical Provider, MD   oxyCODONE-acetaminophen (PERCOCET) 7.5-325 MG per tablet Take 1 tablet by mouth every 12 hours as needed for Pain. Historical Provider, MD   metoprolol succinate (TOPROL XL) 50 MG extended release tablet Take 50 mg by mouth daily    Historical Provider, MD   pravastatin (PRAVACHOL) 10 MG tablet Take 10 mg by mouth daily. Historical Provider, MD   folic acid (FOLVITE) 1 MG tablet Take 1 mg by mouth daily. Historical Provider, MD   montelukast (SINGULAIR) 10 MG tablet Take 10 mg by mouth nightly. Historical Provider, MD   loratadine (CLARITIN) 10 MG tablet Take 10 mg by mouth daily. Historical Provider, MD   therapeutic multivitamin-minerals (THERAGRAN-M) tablet Take 1 tablet by mouth daily. Historical Provider, MD     Chemical Dependency History:   Tobacco: Per Epic, never  Alcohol: Per Epic, no  Illicit: Per Epic, no    Family Hx:    No family history on file.      Social Hx:   Marital Status:   Children: None listed in [facility]: Living at home  Vocational: Per Epic, unemployed  Abuse/Trauma: Reports that a brother was going through a divorce and killed his significant other and then completed suicide around 2013  Legal: None disclosed    Current Medications Ordered:   apixaban  5 mg Oral BID    multivitamin  1 tablet Oral Daily metoprolol succinate  25 mg Oral BID    zinc sulfate  50 mg Oral Daily    dilTIAZem  240 mg Oral Daily    furosemide  20 mg Oral Daily    collagenase   Topical Daily    doxepin  150 mg Oral Nightly    thiamine  100 mg IntraVENous Daily    melatonin  3 mg Oral Nightly    Venelex   Topical BID    lactulose  10 g Oral TID    collagenase   Topical Daily    silver nitrate applicators   Topical Once    sodium chloride flush  5-40 mL IntraVENous 2 times per day    famotidine (PEPCID) injection  20 mg IntraVENous Daily      PRN Meds: magnesium sulfate, LORazepam, sodium chloride, prochlorperazine **OR** prochlorperazine, clonazePAM, perflutren lipid microspheres, sodium chloride flush, sodium chloride, polyethylene glycol, acetaminophen **OR** acetaminophen     ROS: See Medical H&PE     PE:    BP (!) 151/88   Pulse 80   Temp 98 °F (36.7 °C) (Oral)   Resp 18   Ht 6' (1.829 m)   Wt 155 lb 6.8 oz (70.5 kg)   SpO2 98%   BMI 21.08 kg/m²       Motor / Gait: Observed laying in bed, gait deferred  AIMS: 0    Mental Status Examination:    Appearance: WM, appears stated age, lying in bed, wearing hospital attire, fair grooming and fair hygiene   Behavior/Attitude Toward Examiner: Cooperative, fair eye contact  Speech: Spontaneous, normal rate, normal volume and well articulated   Mood: \"Okay\"  Affect: Mood congruent   Thought Processes: Linear  Thought Content: Denies SI/HI, no delusions voiced, no obsessions  Perceptions: Denies AVH but collateral and chart review indicates intermittent hallucinations, did not appear to be actively RTIS  Attention: Limited  Cognition: Alert and oriented to person, time; disoriented to place and situation, immediate, recent, and remote recall impaired  Insight: Impaired insight   Judgment: Impaired judgment      LAB: Reviewed all labs obtained during admission to date.       Dx:   Primary Psychiatric (DSM V) Diagnosis: Delirium  Secondary Psychiatric (DSM V) Diagnoses: Depression, anxiety  Chemical Dependency Diagnoses: None     Assessment  Reviewed nursing and ancillary staff notes since arrival to hospital, reviewed previous records and records available through [facility]. Evaluated medications and assessed for side effects and effectiveness. Assessed patient's educational needs including reviewing plan of care, medications, and diagnosis. Recommendations:    Salud Cabrera does not require inpatient psychiatric admission. His presentation is consistent with delirium, which often takes time to clear. Will restart home medication of Seroquel, however, will do so at a lower dose as collateral indicated that he appears oversedated on home dose. Seroquel 200 mg HS ordered. Monitor QTc; follow up EKG ordered for 02/04 - most recent on 02/01 was 427. Was prolonged earlier in admission but patient had severe, complex medical issues at that time. If PRN is needed during the daytime, can try Seroquel 12.5 or 25 mg. Spent >80 minutes face to face with patient of which >50% was spent counseling and providing education regarding diagnosis, treatment options, and prognosis. Thank you for consult. Please do not hesitate to contact provider if there are additional questions regarding patient.     Kennedy Toure, MPH, PMHNP-BC  02/03/23

## 2023-02-03 NOTE — PROGRESS NOTES
General Surgery   Daily Progress Note  Patient: Linda Bartholomew    CC: Sacral decubitus ulcer    SUBJECTIVE:  Pt is comfortable and has no acute complaints at this time. ROS: A 14 point review of systems was conducted, significant findings as noted above. All other systems negative. OBJECTIVE:   Infusions:   sodium chloride      sodium chloride 25 mL (02/03/23 1208)      I/O:I/O last 3 completed shifts: In: 707.2 [P.O.:440; I.V.:217.2; IV Piggyback:50]  Out: 5550 [Urine:5550]           Wt Readings from Last 1 Encounters:   02/03/23 155 lb 6.8 oz (70.5 kg)       Exam:  /77   Pulse 80   Temp 98 °F (36.7 °C) (Oral)   Resp 18   Ht 6' (1.829 m)   Wt 155 lb 6.8 oz (70.5 kg)   SpO2 98%   BMI 21.08 kg/m²     General Appearance:  in no apparent distress   Neuro: A&Ox3 to person, place and time with no focal deficits, but has generalized weakness. Lungs: Normal effort; no adventitious breath sounds  Heart: Regular rate and rhythm  Abdomen: Soft, non-tender, non-distended; no guarding, no rigidity, no rebound  Extremities: No edema, no cyanosis  Sacrum:  stage 4 ulcer (7.0 cm x 5.0 cm x 2.0 cm); hemostatic, no purulence or odor; some yellow slough at wound base                  LABS:   Recent Labs     02/02/23  0600 02/03/23  0630   WBC 7.8 6.8   HGB 8.0* 8.8*   HCT 23.7* 26.6*   MCV 99.5 102.0*    211        Recent Labs     02/01/23  0522 02/02/23  0600 02/03/23  0630    147* 141   K 3.4* 3.1* 3.6    109 106   CO2 26 27 27   PHOS 3.1  --   --    BUN 9 9 9   CREATININE 0.6* 0.6* 0.6*        Recent Labs     02/02/23  0600   AST 24   ALT 24   BILIDIR 0.4*   BILITOT 1.0   ALKPHOS 122      No results for input(s): LIPASE, AMYLASE in the last 72 hours. Recent Labs     02/02/23  0600   PROT 5.8*      No results for input(s): CKTOTAL, CKMB, CKMBINDEX, TROPONINI in the last 72 hours.     ASSESSMENT/PLAN:   This is a 58 y.o. male with Hx of chronic back pain, pulmonary embolism (12/2022) on Eliquis, CKD, DM, HTN, HLD, TIA, stroke, pancreatitis, gastric bypass who presented with altered mental status from home. General surgery is following for a known stage 4 sacral ulcer now s/p bedside debridement (01/26).     - Continuing to follow and will be available for PRN debridements   - Wound care per Jimmy Sanchez of care per primary team.     Cecile Simmons DO   PGY1, General Surgery  02/03/23  3:18 PM  Contact via [employer]

## 2023-02-03 NOTE — PROGRESS NOTES
Occupational Therapy  Facility/Department: [place] [address] [facility]  Occupational Therapy Treatment Note     Name: Julia Hannah  : 1960  MRN: [id number]  Date of Service: 2/3/2023    Discharge Recommendations: Julia Hannah scored a 10/24 on the AM-PAC ADL Inpatient form. Current research shows that an AM-PAC score of 17 or less is typically not associated with a discharge to the patient's home setting. Based on the patient's AM-PAC score and their current ADL deficits, it is recommended that the patient have 3-5 sessions per week of Occupational Therapy at d/c to increase the patient's independence. Please see assessment section for further patient specific details. If patient discharges prior to next session this note will serve as a discharge summary. Please see below for the latest assessment towards goals. OT Equipment Recommendations  Equipment Needed: No  Other: defer to next care facility       Patient Diagnosis(es): The primary encounter diagnosis was Delirium. Diagnoses of Sepsis with encephalopathy without septic shock, due to unspecified organism ([address] and Urinary tract infection without hematuria, site unspecified were also pertinent to this visit. Past Medical History:  has a past medical history of Bradycardia, Depression, Diabetes mellitus ([address] Hyperlipidemia, Hypertension, Osteoarthritis of lumbar spine, Primary osteoarthritis of lumbar spine, Unspecified cerebral artery occlusion with cerebral infarction, and Unspecified cerebral artery occlusion with cerebral infarction. Past Surgical History:  has a past surgical history that includes back surgery; joint replacement; Gastric bypass surgery; Tonsillectomy; Upper gastrointestinal endoscopy (N/A, 2019); and Colonoscopy (N/A, 2019). Treatment Diagnosis: impaired ADLs/ functional transfers/ decreased endurance      Assessment   Performance deficits / Impairments: Decreased functional mobility ; Decreased endurance;Decreased ADL status; Decreased safe awareness  Assessment: Pt with ongoing deficits - functioning well below baseline level. Pt needing assist x 2 for functional stance. Pt limited by poor endurance / generalized decondition. Pt's BP low with sitting although mostly asymptomatic / alert and talkative. Recommend ongoing inpt OT at [place]. Will follow as inpt. Treatment Diagnosis: impaired ADLs/ functional transfers/ decreased endurance  Prognosis: Fair  REQUIRES OT FOLLOW-UP: Yes        Plan   Occupational Therapy Plan  Times Per Week: 2-5x  Times Per Day: Once a day  Current Treatment Recommendations: Endurance training, Safety education & training, Patient/Caregiver education & training, Self-Care / ADL, Functional mobility training     Restrictions  Position Activity Restriction  Other position/activity restrictions: up with assistance    Subjective   General  Chart Reviewed: Yes  Patient assessed for rehabilitation services?: Yes  Additional Pertinent Hx: Admit 1/25 with Acute Metabolic encephalopathy/ septic shock/ UTI  -recently dx with BPE's 12/2022  CT head- neg bleed, cxray- Bilateral pleural effusions and left greater than right , B foot xray- neg osteo              PMHX:  Bradycardia, Depression, Diabetes mellitus ([address] Hyperlipidemia, Hypertension, TIA, L TKA, back sx,  Family / Caregiver Present: No  Referring Practitioner: Albertina Everett  Diagnosis: Acute Metabolic encephalopathy/ septic shock/ UTI  Subjective  Subjective: \" I don't think I can do this \" pt found resting in bed --  at bedside. Pt agreeable for OOB/OT eval and tx     Social/Functional History  Social/Functional History  Lives With: Spouse  Type of Home: House  Additional Comments: Pt is poor historian, unclear home layout or PLOF. Objective Treatment included functional transfer training, ADL's and pt. education. Safety Devices  Type of Devices: Nurse notified; Bed alarm in place; Left in bed;Call light within reach  Restraints  Restraints Initially in Place: No  Restraints: left off per RN request     Toilet Transfers  Toilet - Technique: Stand step  Toilet Transfer: 2 Person assistance;Dependent/Total;Moderate assistance  Toilet Transfers Comments: simulated - stand step lateral transfers x 3 steps to [facility]     ADL  Feeding: Setup;Stand by assistance  Feeding Skilled Clinical Factors: bring drink to mouth  LE Dressing: Dependent/Total  LE Dressing Skilled Clinical Factors: with BLE socks/  Pt with B foot wraps on  Toileting: Dependent/Total  Toileting Skilled Clinical Factors: nichols in place  Additional Comments: Pt needing encouragement at times to maximize effort with selfcare at times        Bed mobility  Supine to Sit: Moderate assistance;2 Person assistance;Dependent/Total  Transfers  Sit to stand: Maximum assistance; Moderate assistance;2 Person assistance;Dependent/Total  Stand to sit: Maximum assistance;Dependent/Total;2 Person assistance  Transfer Comments: Trials x 1 from EOB. Cognition  Overall Cognitive Status: Exceptions  Arousal/Alertness: Delayed responses to stimuli  Following Commands: Follows one step commands with repetition  Attention Span: Difficulty attending to directions  Memory: Decreased short term memory;Decreased recall of recent events  Safety Judgement: Decreased awareness of need for assistance  Insights: Decreased awareness of deficits  Initiation: Requires cues for some  Sequencing: Requires cues for some  Cognition Comment: improved but remains delayed and poor mentation  Orientation  Overall Orientation Status: Impaired  Orientation Level: Oriented to person;Oriented to place; Disoriented to time;Disoriented to situation      Education Given To: Patient  Education Provided: ADL Adaptive Strategies;Transfer Training;Home Exercise Program  Education Method: Demonstration;Verbal  Barriers to Learning: Cognition  Education Outcome: Continued education needed     Activity Tolerance  limited by BP this session: sitting EOB 51/36, returned to supine 90/61, RN aware    AM-PAC Score  AM-PAC Inpatient Daily Activity Raw Score: 10 (02/03/23 1219)  AM-PAC Inpatient ADL T-Scale Score : 27.31 (02/03/23 1219)  ADL Inpatient CMS 0-100% Score: 74.7 (02/03/23 1219)  ADL Inpatient CMS G-Code Modifier : CL (02/03/23 1219)    Goals  Short Term Goals  Time Frame for Short Term Goals: at d/c  Short Term Goal 1: Stance x 5 mins with MIn A for IADLs/ ADLs - not met  Short Term Goal 2: Functional transfers with Min A- not met  Short Term Goal 3: Grooming with Setup and < 2 v.cues- not met  Short Term Goal 4: Oriented to environment 3/4 attempts- not met     Therapy Time   Individual Concurrent Group Co-treatment   Time In 1106         Time Out 1145         Minutes 39             Timed Code Treatment Minutes:   39 mins     Total Treatment Minutes:  39 mins       Pamela Vu, OT

## 2023-02-03 NOTE — PLAN OF CARE
Problem: Pain  Goal: Verbalizes/displays adequate comfort level or baseline comfort level  Outcome: Progressing  Flowsheets (Taken 2/3/2023 1450)  Verbalizes/displays adequate comfort level or baseline comfort level:   Encourage patient to monitor pain and request assistance   Assess pain using appropriate pain scale   Administer analgesics based on type and severity of pain and evaluate response   Implement non-pharmacological measures as appropriate and evaluate response     Problem: Safety - Adult  Goal: Free from fall injury  2/3/2023 1450 by Ella Davis RN  Outcome: Progressing  Flowsheets (Taken 2/3/2023 1450)  Free From Fall Injury:   Based on caregiver fall risk screen, instruct family/caregiver to ask for assistance with transferring infant if caregiver noted to have fall risk factors   Instruct family/caregiver on patient safety     Problem: Skin/Tissue Integrity  Goal: Absence of new skin breakdown  Description: 1. Monitor for areas of redness and/or skin breakdown  2. Assess vascular access sites hourly  3. Every 4-6 hours minimum:  Change oxygen saturation probe site  4. Every 4-6 hours:  If on nasal continuous positive airway pressure, respiratory therapy assess nares and determine need for appliance change or resting period.   2/3/2023 1450 by Ella Davis RN  Outcome: Progressing  Note: Turning and repositioning patient every 2 hours or as needed     Problem: Nutrition Deficit:  Goal: Optimize nutritional status  Outcome: Progressing  Flowsheets (Taken 2/3/2023 1450)  Nutrient intake appropriate for improving, restoring, or maintaining nutritional needs:   Assess nutritional status and recommend course of action   Recommend appropriate diets, oral nutritional supplements, and vitamin/mineral supplements   Monitor oral intake, labs, and treatment plans     Problem: Safety - Medical Restraint  Goal: Remains free of injury from restraints (Restraint for Interference with Medical Device)  Description: INTERVENTIONS:  1. Determine that other, less restrictive measures have been tried or would not be effective before applying the restraint  2. Evaluate the patient's condition at the time of restraint application  3. Inform patient/family regarding the reason for restraint  4.  Q2H: Monitor safety, psychosocial status, comfort, nutrition and hydration  2/3/2023 1450 by Roxann Avila RN  Outcome: Progressing  Flowsheets (Taken 2/3/2023 1450)  Remains free of injury from restraints (restraint for interference with medical device):   Determine that other, less restrictive measures have been tried or would not be effective before applying the restraint   Evaluate the patient's condition at the time of restraint application   Every 2 hours: Monitor safety, psychosocial status, comfort, nutrition and hydration     Problem: Chronic Conditions and Co-morbidities  Goal: Patient's chronic conditions and co-morbidity symptoms are monitored and maintained or improved  Flowsheets (Taken 2/3/2023 1450)  Care Plan - Patient's Chronic Conditions and Co-Morbidity Symptoms are Monitored and Maintained or Improved:   Monitor and assess patient's chronic conditions and comorbid symptoms for stability, deterioration, or improvement   Collaborate with multidisciplinary team to address chronic and comorbid conditions and prevent exacerbation or deterioration   Update acute care plan with appropriate goals if chronic or comorbid symptoms are exacerbated and prevent overall improvement and discharge

## 2023-02-03 NOTE — CARE COORDINATION
Case Management Assessment           Daily Note                 Date/ Time of Note: 2/3/2023 6:04 PM         Note completed by: Lore Hernández    Patient Name: Arielle Rodgers  YOB: 1960    Diagnosis:Delirium [R41.0]  Sepsis ([address] [A41.9]  Urinary tract infection without hematuria, site unspecified [N39.0]  Sepsis with encephalopathy without septic shock, due to unspecified organism ([address] [A41.9, R65.20, G93.40]  Patient Admission Status: Inpatient    Date of Admission:1/25/2023 12:33 PM Length of Stay: 9 GLOS: GMLOS: 8    Current Plan of Care: wound care, monitor  ________________________________________________________________________________________  PT AM-PAC: 9 / 24 per last evaluation on: 2/3    OT AM-PAC: 10 / 24 per last evaluation on: 2/3    DME Needs for discharge: need PT/OT to re-evaluate  ________________________________________________________________________________________  Discharge Plan: Home    Tentative discharge date: 2/4    Current barriers to discharge: medical clearance    Referrals completed: Not Applicable    Resources/ information provided: SNF List  ________________________________________________________________________________________  Case Management Notes:CM spoke with pt and  at bedside this morning and had honest conversation regarding pt's status and discharge planning. CM expressed concern regarding pt discharging home, and explained benefit of discharging to SNF for short period of time before returning home. CM gave  SNF list to go over. CM attempted to follow up regarding possible SNF options,  not in room and did not answer phone. If decision is to DC home, PT/OT will likely need to re-evaluate pt with this in mind in order to give accurate DME recommendations. Casandra Freire and his family were provided with choice of provider; he and his family are in agreement with the discharge plan.     Care Transition Patient: No    [address]  Case Management Department  Ph: [phone]

## 2023-02-03 NOTE — PROGRESS NOTES
Physical Therapy  Facility/Department: [place] [address] [facility]  Physical Therapy Daily Treatment    Name: Veda Davis  : 1960  MRN: [id number]  Date of Service: 2/3/2023    Discharge Recommendations:    Veda Davis scored a 9/24 on the AM-PAC short mobility form. Current research shows that an AM-PAC score of 17 or less is typically not associated with a discharge to the patient's home setting. Based on the patient's AM-PAC score and their current functional mobility deficits, it is recommended that the patient have 5-7 sessions per week of Physical Therapy at d/c to increase the patient's independence. At this time, this patient demonstrates complex nursing, medical, and rehabilitative needs, and would benefit from intensive rehabilitation services upon discharge from the Inpatient setting. This patient demonstrates the ability to participate in and benefit from an intensive therapy program with a coordinated interdisciplinary team approach to foster frequent, structured, and documented communication among disciplines, who will work together to establish, prioritize, and achieve treatment goals. Please see assessment section for further patient specific details. If patient discharges prior to next session this note will serve as a discharge summary. Please see below for the latest assessment towards goals. PT Equipment Recommendations  Equipment Needed: No (defer)      Patient Diagnosis(es): The primary encounter diagnosis was Delirium. Diagnoses of Sepsis with encephalopathy without septic shock, due to unspecified organism ([address] and Urinary tract infection without hematuria, site unspecified were also pertinent to this visit.   Past Medical History:  has a past medical history of Bradycardia, Depression, Diabetes mellitus ([address] Hyperlipidemia, Hypertension, Osteoarthritis of lumbar spine, Primary osteoarthritis of lumbar spine, Unspecified cerebral artery occlusion with cerebral infarction, and Unspecified cerebral artery occlusion with cerebral infarction. Past Surgical History:  has a past surgical history that includes back surgery; joint replacement; Gastric bypass surgery; Tonsillectomy; Upper gastrointestinal endoscopy (N/A, 8/9/2019); and Colonoscopy (N/A, 8/9/2019). Assessment   Assessment: pt presents from home with AMS. Pt is currently requiring mod to max A for bed mobility, mod a x2 to stand, min A x1 in standing and able to take a few side steps to R. Pt limited by fatigue and OH (sitting 51/36, supine 90/61). . Per  pt was IND using RW and had sharp decline \"almost over night\" PTA.  works during the day and is unable to provide 24/7 assist. Pt currently unsafe to ambulate and is well below his baseline. Pt would benefit from continued IP PT at OR to progress mobility toward independence. PT will f/u. Treatment Diagnosis: gait difficulty  Therapy Prognosis: Good  Decision Making: Medium Complexity  Requires PT Follow-Up: Yes  Activity Tolerance  Activity Tolerance: Patient limited by fatigue;Treatment limited secondary to medical complications  Activity Tolerance Comments: limited by BP this session: sitting EOB 51/36, returned to supine 90/61, RN aware. Plan   Physcial Therapy Plan  General Plan:  (2-5x/week)  Current Treatment Recommendations: Strengthening, Balance training, Endurance training, Functional mobility training, Transfer training, Gait training, Stair training, Safety education & training, Therapeutic activities  Safety Devices  Type of Devices: Nurse notified, Bed alarm in place, Left in bed, Call light within reach  Restraints  Restraints Initially in Place: No  Restraints: left off per RN request     Restrictions  Position Activity Restriction  Other position/activity restrictions: up with assistance     Subjective   General Comment  Comments: pt cleared to see for therapy by RN  Subjective  Subjective: pt semisupine in bed on arrival with  present.  Agreeable to therapy. C/o pain in back with mobility, positioned for comfort at end session. Social/Functional History  Social/Functional History  Lives With: Spouse  Type of Home: House  Additional Comments: Pt is poor historian, unclear home layout or PLOF. Vision/Hearing  Vision  Vision: Impaired  Vision Exceptions: Wears glasses at all times  Hearing  Hearing: Within functional limits    Cognition   Orientation  Overall Orientation Status: Impaired  Orientation Level: Oriented to person;Oriented to place; Disoriented to time;Disoriented to situation  Cognition  Overall Cognitive Status: Exceptions  Arousal/Alertness: Delayed responses to stimuli  Following Commands: Follows one step commands with repetition  Attention Span: Difficulty attending to directions  Memory: Decreased short term memory;Decreased recall of recent events  Safety Judgement: Decreased awareness of need for assistance  Insights: Decreased awareness of deficits  Initiation: Requires cues for some  Sequencing: Requires cues for some  Cognition Comment: improved but remains delayed and poor mentation     Objective   Heart Rate: 80  Heart Rate Source: Monitor  BP: 121/77  BP Location: Right upper arm  BP Method: Automatic  Patient Position: Semi fowlers  MAP (Calculated): 92  Resp: 18  SpO2: 98 %  O2 Device: None (Room air)           Balance  Sitting:  (SBA at EOB)  Standing: With support (Min A at RW)  Bed mobility  Supine to Sit: Maximum assistance (assist at trunk, LEs)  Sit to Supine: 2 Person assistance;Maximum assistance (HOB flat)  Transfers  Sit to Stand: Moderate Assistance;2 Person Assistance (use of RW, cues for hand placement, bed slightly elevated. Delayed initiation noted)  Stand to Sit: Moderate Assistance;2 Person Assistance  Comment: Pt able to tolerate standing for 1-2min and take a few sidesteps to [facility] to R with min A once standing.            AM-PAC Score  AM-PAC Inpatient Mobility Raw Score : 9 (02/03/23 0992)  AM-PAC Inpatient T-Scale Score : 30.55 (02/03/23 1322)  Mobility Inpatient CMS 0-100% Score: 81.38 (02/03/23 1322)  Mobility Inpatient CMS G-Code Modifier : CM (02/03/23 1322)            Goals  Short Term Goals  Time Frame for Short Term Goals: discharge  Short Term Goal 1: pt will perf supine<>sit with CGA  Short Term Goal 2: pt will tolerate SPT with RW and min A x1  Short Term Goal 3: pt will amb 30ft with RW min A       Education  Patient Education  Education Given To: Patient  Education Provided: Role of Therapy;Plan of Care;Transfer Training;Orientation  Education Method: Verbal  Barriers to Learning: Cognition  Education Outcome: Continued education needed      Therapy Time   Individual Concurrent Group Co-treatment   Time In 1105         Time Out 1146         Minutes 41             Timed Code Treatment Minutes:41    Total Treatment Minutes:41  If patient is discharged prior to next treatment, this note will serve as the discharge summary.     Migdalia Wilson PT, DPT, NCS, CSRS

## 2023-02-03 NOTE — PLAN OF CARE
Problem: Safety - Adult  Goal: Free from fall injury  2/3/2023 0415 by Carolyne Oakley RN  Outcome: Progressing  Flowsheets (Taken 2/3/2023 0415)  Free From Fall Injury: Instruct family/caregiver on patient safety     Problem: Skin/Tissue Integrity  Goal: Absence of new skin breakdown  Description: 1. Monitor for areas of redness and/or skin breakdown  2. Assess vascular access sites hourly  3. Every 4-6 hours minimum:  Change oxygen saturation probe site  4. Every 4-6 hours:  If on nasal continuous positive airway pressure, respiratory therapy assess nares and determine need for appliance change or resting period.   2/3/2023 0415 by Carolyne Oakley RN  Outcome: Progressing     Problem: Safety - Medical Restraint  Goal: Remains free of injury from restraints (Restraint for Interference with Medical Device)  2/3/2023 0415 by Carolyne Oakley RN  Outcome: Progressing  Flowsheets (Taken 2/3/2023 3721)  Remains free of injury from restraints (restraint for interference with medical device):   Determine that other, less restrictive measures have been tried or would not be effective before applying the restraint   Evaluate the patient's condition at the time of restraint application   Every 2 hours: Monitor safety, psychosocial status, comfort, nutrition and hydration

## 2023-02-03 NOTE — PROGRESS NOTES
HOSPITALISTS PROGRESS NOTE    2/3/2023 12:19 PM        Name: Peg Eagle . Admitted: 1/25/2023  Primary Care Provider: Farhad Rausch MD (Tel: [phone])      Problem List  Principal Problem:    Sepsis [facility])  Active Problems:    Delirium    Septic shock ([facility]    Decubitus ulcer of sacral region, stage 4 ([facility]    Atrial fibrillation with rapid ventricular response (HCC)    PAC (premature atrial contraction)    PVC (premature ventricular contraction)    Primary hypertension    Mixed hyperlipidemia  Resolved Problems:    * No resolved hospital problems.  *       Assessment & Plan:   Confusion/agitation  Persistent  Discussed with the  at bedside, CT head on 25 January unremarkable, will get psych input, discussed with the  we think might do better at home setting, if patient does not have any major overnight issuesmight consider discharging him home tomorrow      Anemia with microcytosis  No obvious gi bleeding, no evidence of bleeding on CT scan, rule out hemolytic anemia, check folate B12 PRBCs to keep hemoglobin above 7  Haptoglobin is normal, LDH is minimally elevated, reticulocyte count is slightly elevated, get hematology input regarding, hematology input noticed and appreciated, hemoglobin is stable, r transition from IV heparin to p.o. Eliquis       hypernatremia better with free water    Hypokalemia and hypomagnesemia  Replace potassium and magnesium    Anasarca with poor p.o. intake  On IV albumin, follow-up on protein creatinine ratio,  Looking at the chart it does appear that patient does has chronic anasarca, discussed with patient's  who also acknowledged that PCP notes also reviewed about that, it does appear it might be related to chronic malabsorption related to his history of gastric bypass we will stop IV albumin improving, good diuresis,    History of gastric bypass surgery 2004      Recent PE in December  Patient is on heparin drip, no active bleeding noticed continue on heparin drip switched to Eliquis today    Atrial fibrillation with RVR  On heparin drip cardiology following on Toprol and Cardizem, on heparin drip, switched to Eliquis today    Episodes of agitation with concerns of delirium CT head on 125 negative for any acute abnormality  Does has a history of depression  On patient is on Sinequan and clonazepam both are ordered over here, confirm with the     Decubitus ulcer stage IV  Pseudomonas, antibiotic changed to cefepime from Rocephin, procalcitonin on admission was about 42 which has come down to 0.86, will stop antibiotics today, podiatry note reviewed    IV Access: Peripheral  Joseph:  Diet: ADULT ORAL NUTRITION SUPPLEMENT; Breakfast, Dinner; Wound Healing Oral Supplement  ADULT DIET; Regular  ADULT ORAL NUTRITION SUPPLEMENT; Breakfast, Dinner; Standard 4 oz Oral Supplement  Code:Full Code  DVT PPX Heparin drip  Disposition monitor in the hospital      Brief Course: Patient is admitted on 60OY with the metabolic encephalopathy severe sepsis complicated UTI ALISON he had elevated LFTs on admission also had a history of gastric bypass surgery also found to have sacral decubitus ulcer stage IV      CC: Confusion and agitation    Subjective:  . Patient  at bedside, patient continues to have episode of confusion, patient still on restraints, appears slightly lethargic today  Patient was admitted at [facility] for 7 days at the end of December did had a pulmonary embolism at that time did also had coag negative bacteremia which was thought to be contaminant patient was given p.o.  Bactrim on discharge,  Reviewed interval ancillary notes    Current Medications  apixaban (ELIQUIS) tablet 5 mg, BID  magnesium sulfate 1000 mg in dextrose 5% 100 mL IVPB, Once  [START ON 2/4/2023] pantoprazole (PROTONIX) tablet 40 mg, QAM AC  magnesium sulfate 2000 mg in 50 mL IVPB premix, PRN  multivitamin 1 tablet, Daily  metoprolol succinate (TOPROL XL) extended release tablet 25 mg, BID  zinc sulfate (ZINCATE) capsule 50 mg, Daily  dilTIAZem (CARDIZEM CD) extended release capsule 240 mg, Daily  furosemide (LASIX) tablet 20 mg, Daily  LORazepam (ATIVAN) injection 0.5 mg, Q6H PRN  0.9 % sodium chloride infusion, PRN  collagenase ointment, Daily  doxepin (SINEQUAN) capsule 150 mg, Nightly  melatonin tablet 3 mg, Nightly  Venelex ointment, BID  lactulose (CHRONULAC) 10 GM/15ML solution 10 g, TID  collagenase ointment, Daily  prochlorperazine (COMPAZINE) injection 10 mg, Q6H PRN   Or  prochlorperazine (COMPAZINE) tablet 10 mg, Q6H PRN  silver nitrate applicators applicator, Once  clonazePAM (KLONOPIN) tablet 1 mg, BID PRN  perflutren lipid microspheres (DEFINITY) injection 1.5 mL, ONCE PRN  sodium chloride flush 0.9 % injection 5-40 mL, 2 times per day  sodium chloride flush 0.9 % injection 5-40 mL, PRN  0.9 % sodium chloride infusion, PRN  polyethylene glycol (GLYCOLAX) packet 17 g, Daily PRN  acetaminophen (TYLENOL) tablet 650 mg, Q6H PRN   Or  acetaminophen (TYLENOL) suppository 650 mg, Q6H PRN      Objective:  /77   Pulse 80   Temp 98 °F (36.7 °C) (Oral)   Resp 18   Ht 6' (1.829 m)   Wt 155 lb 6.8 oz (70.5 kg)   SpO2 98%   BMI 21.08 kg/m²     Intake/Output Summary (Last 24 hours) at 2/3/2023 1219  Last data filed at 2/3/2023 1011  Gross per 24 hour   Intake 419.91 ml   Output 3025 ml   Net -2605.09 ml      Wt Readings from Last 3 Encounters:   02/03/23 155 lb 6.8 oz (70.5 kg)   08/09/19 228 lb (103.4 kg)   03/17/17 231 lb (104.8 kg)     Alert awake slightly drowsy  Abdominal soft  Pedal edema is improving/anasarca is improving  Lungs are clear to auscultation  Patient still has restraints in place  Labs and Tests:  CBC:   Recent Labs     02/01/23  0522 02/01/23  1350 02/02/23  0600 02/03/23  0630   WBC 7.2  --  7.8 6.8   HGB 6.7* 8.2* 8.0* 8.8*     --  189 211     BMP:    Recent Labs 02/01/23  0522 02/02/23  0600 02/03/23  0630    147* 141   K 3.4* 3.1* 3.6    109 106   CO2 26 27 27   BUN 9 9 9   CREATININE 0.6* 0.6* 0.6*   GLUCOSE 79 79 88     Hepatic:   Recent Labs     02/02/23  0600   AST 24   ALT 24   BILITOT 1.0   ALKPHOS 122     CT ABDOMEN PELVIS W IV CONTRAST Additional Contrast? None   Final Result   1. Evidence for third spacing of fluid with moderate bilateral pleural effusions, trace ascites and anasarca. 2. No retroperitoneal hematoma. 3. Small hiatal hernia. US DUP ABD PEL RETRO SCROT COMPLETE   Final Result      1. Patent portal veins, hepatic artery and hepatic veins. XR FOOT LEFT (MIN 3 VIEWS)   Final Result      1. No definite evidence of active osteomyelitis. XR FOOT RIGHT (MIN 3 VIEWS)   Final Result      1. No definite evidence of active osteomyelitis. XR CHEST PORTABLE   Final Result         1. Good position of left IJ central venous catheter in the lower SVC   2. No pneumothorax   3. Bilateral pleural effusions and left greater than right lung airspace disease             POC US ECHOCARDIO TRANSTHORACIC LTD   Final Result      CT Head W/O Contrast   Final Result      No evidence of acute intracranial abnormality. XR CHEST PORTABLE   Final Result   1. Abnormal airspace disease throughout the left hemithorax.    2. Follow-up is recommended with specific attention to obtaining a upright PA and lateral chest                Mayra Wolf MD   2/3/2023 12:19 PM

## 2023-02-03 NOTE — PROGRESS NOTES
Progress Note      SUBJECTIVE:    Patient seen and examined at bedside. He is feeling well, without CP, SOB. Patient was transitioned from IV heparin to PO eliquis    HPI:  Mr. Mere Julian  is a 58 y.o. male with PMHx HTN, HLD, DM, PE on eliquis, CKD gastric bypass who we are seeing in consultation for \"anemia without active bleeding source requiring PRBC. \" Patient was originally admitted to ICU on 1/25/2023 due to altered mental status from home. Per chart review, it seems that he has been declining with respect to his memory and ability to perform ADL gradually over the course of several months. He was assessed as having acute metabolic encephalopathy with severe sepsis (source thought to be from urine or stage 4 sacral decubitus ulcer) and started on vancomycin amd merrem. The patient was weaned off vasopressor requirement and transferred out of ICU 1/27. Debridement performed 1/29/23. OBJECTIVE:   Hemoglobin count 8.8 from 8.0. reticulocyte count 3.96    Physical  Vitals:  BP (!) 151/88   Pulse 80   Temp 98 °F (36.7 °C) (Oral)   Resp 18   Ht 6' (1.829 m)   Wt 155 lb 6.8 oz (70.5 kg)   SpO2 98%   BMI 21.08 kg/m²    24HR INTAKE/OUTPUT:    Intake/Output Summary (Last 24 hours) at 2/3/2023 1039  Last data filed at 2/3/2023 0701  Gross per 24 hour   Intake 419.91 ml   Output 2625 ml   Net -2205.09 ml       CONSTITUTIONAL: awake, alert, cooperative, no apparent distress   ENT: Normocephalic, without obvious abnormality, atraumatic  NECK: supple, symmetrical, no jugular venous distension . LIJ CVC   LUNGS: no increased work of breathing and clear to auscultation   CARDIOVASCULAR: regular rate and rhythm, normal S1 and S2, no murmur noted  ABDOMEN: normal bowel sounds x 4, soft, non-distended, non-tender, no masses palpated, no hepatosplenomegaly   MUSCULOSKELETAL: full range of motion noted, tone is normal  NEUROLOGIC: AOx2  SKIN: Normal skin color, texture, turgor and no jaundice.  appears intact, ecchymoses present on RUE  EXTREMITIES: no LE edema     Data  General Labs:    CBC:   Recent Labs     02/01/23  0522 02/01/23  1350 02/02/23  0600 02/03/23  0630   WBC 7.2  --  7.8 6.8   HGB 6.7* 8.2* 8.0* 8.8*   HCT 19.9* 24.5* 23.7* 26.6*   .0*  --  99.5 102.0*     --  189 211     BMP:   Recent Labs     02/01/23  0522 02/02/23  0600 02/03/23  0630    147* 141   K 3.4* 3.1* 3.6    109 106   CO2 26 27 27   PHOS 3.1  --   --    BUN 9 9 9   CREATININE 0.6* 0.6* 0.6*     LIVER PROFILE:   Recent Labs     02/02/23  0600   AST 24   ALT 24   BILIDIR 0.4*   BILITOT 1.0   ALKPHOS 122     PT/INR:    Lab Results   Component Value Date/Time    PROTIME 20.1 01/28/2023 06:40 AM    PROTIME 21.0 01/27/2023 07:45 AM    PROTIME 22.4 01/26/2023 03:58 AM    INR 1.71 01/28/2023 06:40 AM    INR 1.82 01/27/2023 07:45 AM    INR 1.96 01/26/2023 03:58 AM     PTT:    Lab Results   Component Value Date/Time    APTT 124.2 01/29/2023 03:56 AM    APTT 119.5 01/28/2023 08:37 PM    APTT >180.0 01/28/2023 01:04 PM     UA:No results for input(s): NITRITE, COLORU, PHUR, LABCAST, WBCUA, RBCUA, MUCUS, TRICHOMONAS, YEAST, BACTERIA, CLARITYU, SPECGRAV, LEUKOCYTESUR, UROBILINOGEN, BILIRUBINUR, BLOODU, GLUCOSEU, AMORPHOUS in the last 72 hours.     Invalid input(s): KETONESU  Magnesium:   Lab Results   Component Value Date/Time    MG 1.70 02/03/2023 06:30 AM    MG 1.60 02/02/2023 06:00 AM    MG 1.70 02/01/2023 05:22 AM     BENJAMÍN:    Lab Results   Component Value Date/Time    BENJAMÍN Negative 01/25/2023 04:19 PM       RADIOLOGY:      Current Medications   apixaban  5 mg Oral BID    multivitamin  1 tablet Oral Daily    metoprolol succinate  25 mg Oral BID    zinc sulfate  50 mg Oral Daily    dilTIAZem  240 mg Oral Daily    furosemide  20 mg Oral Daily    collagenase   Topical Daily    doxepin  150 mg Oral Nightly    thiamine  100 mg IntraVENous Daily    melatonin  3 mg Oral Nightly    Venelex   Topical BID    lactulose  10 g Oral TID collagenase   Topical Daily    silver nitrate applicators   Topical Once    sodium chloride flush  5-40 mL IntraVENous 2 times per day    famotidine (PEPCID) injection  20 mg IntraVENous Daily       ASSESSMENT AND PLAN    Acute on chronic anemia with macrocytosis  -patient has chronic anemia with hgb being in range of 7-8 during this admission, dropping as low as 5.7. Foate 12.8 and B12 963 yesterday. Haptoglobin wnl 173, though LDH is elevated at 288. Haptoglobin is sensitive marker for hemolysis Reticulocyte count elevated 6.59, immature retic fracture elevated 0.47. No leukopenia or thrombocytopenia.    -he had EGD/colonoscopy performed 8/2019 significant for sigmoid polyp, normal esophagus, small gastric polyps from prior gastric bypass.   -He has required transfusion of 2 units of  PRBC on 1/30 and 2/1.  -CT abd/pelvis to evaluate for RP bleed              -CT abdomen pelvis 2/1/23 no retroperitoneal hematoma  -Liver disease is a consideration through elevation in INR can be from DOAC  -Acute on chronic anemia, worsening possibly related to current infection  -Retic count is trending down, from 6-3  -We will continue to follow the patient    Angeles Esquivel MD, 2/3/2023, 10:39 AM PGY2

## 2023-02-03 NOTE — PROGRESS NOTES
While PT and OT were working with pt, telemetry monitor showed pt's BP read as 51/36. This RN entered room to pt sitting up at the edge of bed with PT and OT present. Pt alert, conversational, and only complained of a small amount of dizziness. Recheck read 55/43. PT,OT and RN assisted Pt back into a lying position in bed. While lying, BP increased to 83/53. Minutes later recheck was 91/64. Dr Vivienne Hopper notified. Advised to recheck BP in a few hours.

## 2023-02-03 NOTE — PROGRESS NOTES
CC: AF RVR   HPI:     S: \"I'm tired\". Denies c/o cp or sob or palpitations . Tele: Sinus PVC, PAC     O:  Physical Exam:  BP (!) 151/88   Pulse 80   Temp 98 °F (36.7 °C) (Oral)   Resp 18   Ht 6' (1.829 m)   Wt 155 lb 6.8 oz (70.5 kg)   SpO2 98%   BMI 21.08 kg/m²    General (appearance):   no acute distress   Eyes: anicteric   Neck: soft, No JVD  Ears/Nose/Mouth/Thorat: No cyanosis  CV: Reg, frequent ectopy   Respiratory:  Diminished, normal effort  GI: soft, non-tender, non-distended  Skin: Warm, dry. No rashes. ACE wraps and shyam boots to both feet   Neuro/Psych:  Alert, calm and cooperative  Ext:  No c/c. No edema   Pulses:  2+ radial     I.O's= -12 L     Weight  Admission: Weight: 175 lb 11.3 oz (79.7 kg)   Today: Weight: 155 lb 6.8 oz (70.5 kg)    CBC:   Recent Labs     23  0522 23  1350 23  0600 23  0630   WBC 7.2  --  7.8 6.8   HGB 6.7* 8.2* 8.0* 8.8*   HCT 19.9* 24.5* 23.7* 26.6*   .0*  --  99.5 102.0*     --  189 211     BMP:   Recent Labs     23  0522 23  0600 23  0630    147* 141   K 3.4* 3.1* 3.6    109 106   CO2 26 27 27   PHOS 3.1  --   --    BUN 9 9 9   CREATININE 0.6* 0.6* 0.6*     Estimated Creatinine Clearance: 127 mL/min (A) (based on SCr of 0.6 mg/dL (L)). Mag:   Lab Results   Component Value Date/Time    MG 1.70 2023 06:30 AM     LIVER PROFILE:   Recent Labs     23  0600   AST 24   ALT 24   BILIDIR 0.4*   BILITOT 1.0   ALKPHOS 122     PT/INR: No results for input(s): PROTIME, INR in the last 72 hours. Pro-BNP:   Lab Results   Component Value Date/Time    PROBNP 520 2023 04:19 PM     CARDIAC ENZYMES: No results for input(s): CKTOTAL, CKMB, TROPONINI in the last 72 hours. Imagin2023 Echo   Technically difficult examination. Ejection fraction is visually estimated to be 60-65 %. Normal right ventricular size and function.    Moderate circumferential pericardial effusion with prominent pericardial fat. No tamponade physiology. Assessment:  Delirium   AF RVR  PAC/PVC  Severe anemia  Decubitus ulcer  DM  HLD  HTN  Hx CVA  Sepsis    Plan:  -Keep K>4, Mg>2. K replaced   -Toprol 25 mg po bid. Hold for SBP< 100 mmHg or HR <60 bpm  -Lasix 20 mg po daily   -Diltiazem  mg po daily  -Eliquis started.  Monitor H/H

## 2023-02-03 NOTE — PLAN OF CARE
Problem: Safety - Medical Restraint  Goal: Remains free of injury from restraints (Restraint for Interference with Medical Device)  Description: INTERVENTIONS:  1. Determine that other, less restrictive measures have been tried or would not be effective before applying the restraint  2. Evaluate the patient's condition at the time of restraint application  3. Inform patient/family regarding the reason for restraint  4.  Q2H: Monitor safety, psychosocial status, comfort, nutrition and hydration  2/3/2023 1722 by Johnny Gudino RN  Outcome: Completed   Restraints DC'd at 1500

## 2023-02-03 NOTE — PROGRESS NOTES
Restraints order  at 1500. Pt compliant, pleasant, and resting in bed. Educated pt on use of call light and not to get up. Pt has not attempted to pull at lines/drains.

## 2023-02-04 VITALS
RESPIRATION RATE: 15 BRPM | WEIGHT: 159.17 LBS | HEART RATE: 99 BPM | BODY MASS INDEX: 21.56 KG/M2 | DIASTOLIC BLOOD PRESSURE: 72 MMHG | OXYGEN SATURATION: 97 % | TEMPERATURE: 98.7 F | SYSTOLIC BLOOD PRESSURE: 100 MMHG | HEIGHT: 72 IN

## 2023-02-04 PROBLEM — I48.0 PAROXYSMAL ATRIAL FIBRILLATION (HCC): Status: ACTIVE | Noted: 2023-02-04

## 2023-02-04 PROBLEM — I47.1 ATRIAL TACHYCARDIA (HCC): Status: ACTIVE | Noted: 2023-02-04

## 2023-02-04 PROBLEM — R00.0 SINUS TACHYCARDIA: Status: ACTIVE | Noted: 2023-02-04

## 2023-02-04 PROBLEM — Z79.01 ON CONTINUOUS ORAL ANTICOAGULATION: Status: ACTIVE | Noted: 2023-02-04

## 2023-02-04 LAB
ALBUMIN SERPL-MCNC: 3.5 G/DL (ref 3.4–5)
ALP BLD-CCNC: 124 U/L (ref 40–129)
ALT SERPL-CCNC: 20 U/L (ref 10–40)
ANION GAP SERPL CALCULATED.3IONS-SCNC: 8 MMOL/L (ref 3–16)
AST SERPL-CCNC: 20 U/L (ref 15–37)
BASOPHILS ABSOLUTE: 0 K/UL (ref 0–0.2)
BASOPHILS RELATIVE PERCENT: 0.3 %
BILIRUB SERPL-MCNC: 0.8 MG/DL (ref 0–1)
BILIRUBIN DIRECT: 0.3 MG/DL (ref 0–0.3)
BILIRUBIN, INDIRECT: 0.5 MG/DL (ref 0–1)
BUN BLDV-MCNC: 12 MG/DL (ref 7–20)
CALCIUM SERPL-MCNC: 8.5 MG/DL (ref 8.3–10.6)
CHLORIDE BLD-SCNC: 105 MMOL/L (ref 99–110)
CO2: 27 MMOL/L (ref 21–32)
CREAT SERPL-MCNC: 0.8 MG/DL (ref 0.8–1.3)
EKG ATRIAL RATE: 105 BPM
EKG DIAGNOSIS: NORMAL
EKG P AXIS: 60 DEGREES
EKG P-R INTERVAL: 140 MS
EKG Q-T INTERVAL: 364 MS
EKG QRS DURATION: 90 MS
EKG QTC CALCULATION (BAZETT): 481 MS
EKG R AXIS: 96 DEGREES
EKG T AXIS: 75 DEGREES
EKG VENTRICULAR RATE: 105 BPM
EOSINOPHILS ABSOLUTE: 0 K/UL (ref 0–0.6)
EOSINOPHILS RELATIVE PERCENT: 0.4 %
GFR SERPL CREATININE-BSD FRML MDRD: >60 ML/MIN/{1.73_M2}
GLUCOSE BLD-MCNC: 80 MG/DL (ref 70–99)
HCT VFR BLD CALC: 26.1 % (ref 40.5–52.5)
HEMOGLOBIN: 8.8 G/DL (ref 13.5–17.5)
LYMPHOCYTES ABSOLUTE: 1.3 K/UL (ref 1–5.1)
LYMPHOCYTES RELATIVE PERCENT: 21 %
MAGNESIUM: 1.9 MG/DL (ref 1.8–2.4)
MCH RBC QN AUTO: 33.8 PG (ref 26–34)
MCHC RBC AUTO-ENTMCNC: 33.6 G/DL (ref 31–36)
MCV RBC AUTO: 100.5 FL (ref 80–100)
MONOCYTES ABSOLUTE: 0.6 K/UL (ref 0–1.3)
MONOCYTES RELATIVE PERCENT: 9.5 %
NEUTROPHILS ABSOLUTE: 4.4 K/UL (ref 1.7–7.7)
NEUTROPHILS RELATIVE PERCENT: 68.8 %
PDW BLD-RTO: 18.1 % (ref 12.4–15.4)
PLATELET # BLD: 225 K/UL (ref 135–450)
PMV BLD AUTO: 7.4 FL (ref 5–10.5)
POTASSIUM REFLEX MAGNESIUM: 3.4 MMOL/L (ref 3.5–5.1)
RBC # BLD: 2.6 M/UL (ref 4.2–5.9)
SODIUM BLD-SCNC: 140 MMOL/L (ref 136–145)
TOTAL PROTEIN: 5.9 G/DL (ref 6.4–8.2)
WBC # BLD: 6.4 K/UL (ref 4–11)

## 2023-02-04 PROCEDURE — 6370000000 HC RX 637 (ALT 250 FOR IP): Performed by: NURSE PRACTITIONER

## 2023-02-04 PROCEDURE — 83735 ASSAY OF MAGNESIUM: CPT

## 2023-02-04 PROCEDURE — 99232 SBSQ HOSP IP/OBS MODERATE 35: CPT | Performed by: INTERNAL MEDICINE

## 2023-02-04 PROCEDURE — 80048 BASIC METABOLIC PNL TOTAL CA: CPT

## 2023-02-04 PROCEDURE — 99232 SBSQ HOSP IP/OBS MODERATE 35: CPT | Performed by: NURSE PRACTITIONER

## 2023-02-04 PROCEDURE — 80076 HEPATIC FUNCTION PANEL: CPT

## 2023-02-04 PROCEDURE — 2580000003 HC RX 258: Performed by: STUDENT IN AN ORGANIZED HEALTH CARE EDUCATION/TRAINING PROGRAM

## 2023-02-04 PROCEDURE — 85025 COMPLETE CBC W/AUTO DIFF WBC: CPT

## 2023-02-04 PROCEDURE — 6370000000 HC RX 637 (ALT 250 FOR IP): Performed by: INTERNAL MEDICINE

## 2023-02-04 PROCEDURE — 93005 ELECTROCARDIOGRAM TRACING: CPT | Performed by: NURSE PRACTITIONER

## 2023-02-04 RX ORDER — DIGOXIN 0.25 MG/ML
500 INJECTION INTRAMUSCULAR; INTRAVENOUS ONCE
Status: DISCONTINUED | OUTPATIENT
Start: 2023-02-04 | End: 2023-02-04 | Stop reason: HOSPADM

## 2023-02-04 RX ORDER — POTASSIUM CHLORIDE 20 MEQ/1
40 TABLET, EXTENDED RELEASE ORAL ONCE
Status: DISCONTINUED | OUTPATIENT
Start: 2023-02-04 | End: 2023-02-04 | Stop reason: HOSPADM

## 2023-02-04 RX ADMIN — COLLAGENASE SANTYL: 250 OINTMENT TOPICAL at 09:12

## 2023-02-04 RX ADMIN — COLLAGENASE SANTYL: 250 OINTMENT TOPICAL at 09:13

## 2023-02-04 RX ADMIN — APIXABAN 5 MG: 5 TABLET, FILM COATED ORAL at 09:07

## 2023-02-04 RX ADMIN — METOPROLOL SUCCINATE 25 MG: 25 TABLET, EXTENDED RELEASE ORAL at 09:20

## 2023-02-04 RX ADMIN — Medication: at 12:06

## 2023-02-04 RX ADMIN — PANTOPRAZOLE SODIUM 40 MG: 40 TABLET, DELAYED RELEASE ORAL at 09:07

## 2023-02-04 RX ADMIN — THERA TABS 1 TABLET: TAB at 09:09

## 2023-02-04 RX ADMIN — DILTIAZEM HYDROCHLORIDE 240 MG: 240 CAPSULE, COATED, EXTENDED RELEASE ORAL at 09:07

## 2023-02-04 RX ADMIN — ZINC SULFATE 220 MG (50 MG) CAPSULE 50 MG: CAPSULE at 09:07

## 2023-02-04 RX ADMIN — SODIUM CHLORIDE, PRESERVATIVE FREE 10 ML: 5 INJECTION INTRAVENOUS at 09:10

## 2023-02-04 NOTE — DISCHARGE SUMMARY
59 yo was admitted n 67RV with the metabolic encephalopathy severe sepsis complicated UTI ALISON he had elevated LFTs on admission also had a history of gastric bypass surgery also found to have sacral decubitus ulcer stage IV. The patient and  want to leave AMA , I have lengthy discussion with both of them ( patient and  ) and explained to them medical need and my assessment that the patient is not medically stable yet . The patient and  voiced understanding risk  and they persist to leave against medical advice . I discussed with case management and nurse as well  The  is the next of kin legally and able to make decision  on patient behalf .

## 2023-02-04 NOTE — PROGRESS NOTES
Hospitalist Progress Note      Name:  Mari Yoo /Age/Sex: 1960  (58 y.o. male)   MRN & CSN:  [id number] & [id number] Admission Date/Time: 2023 12:33 PM   Location:  [address] [place], [place] [id number] PCP: Cece Molina MD         Hospital Day: 11    Assessment and Plan:     Patient is admitted on 31KS with the metabolic encephalopathy severe sepsis complicated UTI ALISON he had elevated LFTs on admission also had a history of gastric bypass surgery also found to have sacral decubitus ulcer stage IV    Confusion/agitation  Persistent  , CT head on  unremarkable, will get psych input, ,   Patient very weak and his BP dropped with pt/ot yestrday per nurse       Anemia with microcytosis  No obvious gi bleeding, no evidence of bleeding on CT scan, rule out hemolytic anemia, check folate B12 PRBCs to keep hemoglobin above 7  Haptoglobin is normal, LDH is minimally elevated, reticulocyte count is slightly elevated, get hematology input regarding, hematology input noticed and appreciated, hemoglobin is stable 8.8, r transition from IV heparin to p.o. Eliquis         hypernatremia better with free water    Hypokalemia and hypomagnesemia  Replace potassium and magnesium    Anasarca with poor p.o. intake  On IV albumin, follow-up on protein creatinine ratio,  Looking at the chart it does appear that patient does has chronic anasarca,  it does appear it might be related to chronic malabsorption related to his history of gastric bypass IV albumin was stopped , good diuresis,     History of gastric bypass surgery        Recent PE in December  Patient is on heparin drip, no active bleeding noticed continue on heparin drip switched to Eliquis today    Atrial fibrillation with RVR  On heparin drip cardiology following on Toprol and Cardizem, on heparin drip, switched to Eliquis   HR today jumped to 150  Digoxin ordered    Episodes of agitation with concerns of delirium CT head on 125 negative for any acute abnormality  Does has a history of depression  On patient is on Sinequan and clonazepam both are ordered over here, confirm with the      Decubitus ulcer stage IV  Pseudomonas, antibiotic changed to cefepime from Rocephin, procalcitonin on admission was about 42 which has come down to 0.86,  antibiotics was stopped  podiatry note reviewed     IV Access: Peripheral  Joseph:  Diet: [address]; Breakfast, Dinner; Wound Healing Oral Supplement  ADULT DIET; Regular  ADULT ORAL NUTRITION SUPPLEMENT; Breakfast, Dinner; Standard 4 oz Oral Supplement  Code:Full Code  DVT PPX eliquis   Disposition monitor in the hospital  Likely will need SNF     Diet ADULT ORAL NUTRITION SUPPLEMENT; Breakfast, Dinner; Wound Healing Oral Supplement  ADULT DIET; Regular  ADULT ORAL NUTRITION SUPPLEMENT; Breakfast, Dinner; Standard 4 oz Oral Supplement   DVT Prophylaxis [] Lovenox, []  Heparin, [] SCDs, [] Ambulation   GI Prophylaxis [] PPI,  [] H2 Blocker,  [] Carafate,  [] Diet/Tube Feeds   Code Status Full Code   Disposition Patient requires continued admission due to    MDM [] Low, [] Moderate,[]  High  Patient's risk as above due to      History of Present Illness:     Was seen and examined  Denies CP or SOB  HR went up to 150 this morning  A fib with rvrv   Consulted cardiology   Ordered digoxin   His BP dropped yesterday with standing     Objective: Intake/Output Summary (Last 24 hours) at 2/4/2023 1114  Last data filed at 2/4/2023 0910  Gross per 24 hour   Intake 680 ml   Output 1050 ml   Net -370 ml      Vitals:   Vitals:    02/04/23 0805   BP: 100/72   Pulse: (!) 105   Resp: 15   Temp: 98.7 °F (37.1 °C)   SpO2:      Physical Exam:   GEN Awake.  Alert , not in respiratory distress, not in pain  HEENT: PEERLA, , supple neck,   Chest: air entry equal bilaterally, no wheezing or crepitation  Heart: S1 and S2 heard, no murmur, no gallop or rub, regular rate  Abdomen: soft, ND , Nt, +BS  Extremities: no cyanosis, tenderness or erythema, peripheral pulses audible  Neurology: alert but confused , able to move 4 limbs    Medications:   Medications:    digoxin  500 mcg IntraVENous Once    potassium chloride  40 mEq Oral Once    apixaban  5 mg Oral BID    pantoprazole  40 mg Oral QAM AC    QUEtiapine  200 mg Oral Nightly    multivitamin  1 tablet Oral Daily    metoprolol succinate  25 mg Oral BID    zinc sulfate  50 mg Oral Daily    dilTIAZem  240 mg Oral Daily    furosemide  20 mg Oral Daily    collagenase   Topical Daily    doxepin  150 mg Oral Nightly    melatonin  3 mg Oral Nightly    Venelex   Topical BID    lactulose  10 g Oral TID    collagenase   Topical Daily    silver nitrate applicators   Topical Once    sodium chloride flush  5-40 mL IntraVENous 2 times per day      Infusions:    sodium chloride      sodium chloride 25 mL (02/03/23 1208)     PRN Meds: magnesium sulfate, 2,000 mg, PRN  LORazepam, 0.5 mg, Q6H PRN  sodium chloride, , PRN  prochlorperazine, 10 mg, Q6H PRN   Or  prochlorperazine, 10 mg, Q6H PRN  clonazePAM, 1 mg, BID PRN  perflutren lipid microspheres, 1.5 mL, ONCE PRN  sodium chloride flush, 5-40 mL, PRN  sodium chloride, , PRN  polyethylene glycol, 17 g, Daily PRN  acetaminophen, 650 mg, Q6H PRN   Or  acetaminophen, 650 mg, Q6H PRN          Electronically signed by Hermelindo Louie MD on 2/4/2023 at 11:14 AM

## 2023-02-04 NOTE — PROGRESS NOTES
[facility]  Cardiology Inpatient Consult Service  Daily Progress Note        Admit Date:  1/25/2023    Referring Physician: Peter Alvarado MD    Reason for Consultation/Chief Complaint:     A. fib with RVR    Subjective: Interval history:  CRISELDA  HT elevated    Medications:   apixaban  5 mg Oral BID    pantoprazole  40 mg Oral QAM AC    QUEtiapine  200 mg Oral Nightly    multivitamin  1 tablet Oral Daily    metoprolol succinate  25 mg Oral BID    zinc sulfate  50 mg Oral Daily    dilTIAZem  240 mg Oral Daily    furosemide  20 mg Oral Daily    collagenase   Topical Daily    doxepin  150 mg Oral Nightly    melatonin  3 mg Oral Nightly    Venelex   Topical BID    lactulose  10 g Oral TID    collagenase   Topical Daily    silver nitrate applicators   Topical Once    sodium chloride flush  5-40 mL IntraVENous 2 times per day       IV drips:   sodium chloride      sodium chloride 25 mL (02/03/23 1208)       PRN:  magnesium sulfate, LORazepam, sodium chloride, prochlorperazine **OR** prochlorperazine, clonazePAM, perflutren lipid microspheres, sodium chloride flush, sodium chloride, polyethylene glycol, acetaminophen **OR** acetaminophen      Objective:     Vitals:    02/04/23 0200 02/04/23 0440 02/04/23 0600 02/04/23 0805   BP:  118/72  100/72   Pulse: 77 87 78 (!) 105   Resp:  17  15   Temp:  97.8 °F (36.6 °C)  98.7 °F (37.1 °C)   TempSrc:  Axillary  Oral   SpO2:  97%     Weight:  159 lb 2.8 oz (72.2 kg)     Height:           Intake/Output Summary (Last 24 hours) at 2/4/2023 1006  Last data filed at 2/4/2023 0910  Gross per 24 hour   Intake 700 ml   Output 1450 ml   Net -750 ml     I/O last 3 completed shifts: In: 1109.9 [P.O.:730; I.V.:227.2;  IV Piggyback:152.7]  Out: 2225 [Urine:2225]  Wt Readings from Last 3 Encounters:   02/04/23 159 lb 2.8 oz (72.2 kg)   08/09/19 228 lb (103.4 kg)   03/17/17 231 lb (104.8 kg)       Admit Wt: Weight: 175 lb 11.3 oz (79.7 kg)   Todays Wt: Weight: 159 lb 2.8 oz (72.2 kg)    TELEMETRY: Personally interpreted Atrial fibrillation     Physical Exam:     General:  Awake, alert, NAD  Skin:  Warm and dry  Neck:  -JVP  Chest:  Clear to auscultation, respiration normal  Cardiovascular:  RRR S1S2  Abdomen:  Soft -  Extremities:  - edema      Objective:  Vital signs: (most recent): Blood pressure 100/72, pulse (!) 105, temperature 98.7 °F (37.1 °C), temperature source Oral, resp. rate 15, height 6' (1.829 m), weight 159 lb 2.8 oz (72.2 kg), SpO2 97 %. Labs:   Recent Labs     02/02/23  0600 02/03/23  0630 02/04/23  0505   * 141 140   K 3.1* 3.6 3.4*   BUN 9 9 12   CREATININE 0.6* 0.6* 0.8    106 105   CO2 27 27 27   GLUCOSE 79 88 80   CALCIUM 8.5 8.6 8.5   MG 1.60* 1.70* 1.90     Recent Labs     02/02/23  0600 02/03/23  0630 02/04/23  0504   WBC 7.8 6.8 6.4   HGB 8.0* 8.8* 8.8*   HCT 23.7* 26.6* 26.1*    211 225   MCV 99.5 102.0* 100.5*     No results for input(s): CHOLTOT, TRIG, HDL, CHOLHDL, LDL in the last 72 hours. Invalid input(s): Donna Jolly  No results for input(s): PTT, INR in the last 72 hours. Invalid input(s): PT  No results for input(s): CKTOTAL, CKMB, CKMBINDEX, TROPONINI in the last 72 hours. No results for input(s): BNP in the last 72 hours. No results for input(s): NTPROBNP in the last 72 hours. No results for input(s): TSH in the last 72 hours. Imaging:   I personally reviewed imaging studies including CXR, Stress test, TTE/SABAS. Assessment & Plan:     A. fib with RVR  -Alternating between A. fib with RVR and sinus tachycardia.  -Continue metoprolol.  -Soft BP today. Hold Lasix today  -Continue diltiazem  -Eliquis for A. Fib if no contraindications  -We will give 1 dose of digoxin 500 to assess heart rate response    Thank you for allowing to us to participate in the care of Indra Alvarado. Please call our service with questions. All questions and concerns were addressed to the patient/family. Alternatives to my treatment were discussed. The note was completed using EMR. Every effort was made to ensure accuracy; however, inadvertent computerized transcription errors may be present. I have personally reviewed the reports and images of labs, radiological studies, cardiac studies including ECG's and telemetry, current and old medical records. Kodak Arechiga MD, [facility], [facility] [address]    2/4/2023 10:06 AM

## 2023-02-04 NOTE — PROGRESS NOTES
Removed patient from telemetry and removed 3* LIJ without complications. Dressing is in place with petroleum gauze. AMA paper signed at [phone], and patient left the unit with spouse, son, and daughter in law via transport chair.

## 2023-02-04 NOTE — PROGRESS NOTES
Sent notification to MD r/t patients HR having runs of sinus tach from the low 100's to 150's, non sustaining with a soft pressure of 100/72.

## 2023-02-04 NOTE — PLAN OF CARE
Problem: Discharge Planning  Goal: Discharge to home or other facility with appropriate resources  Outcome: Progressing  Flowsheets (Taken 2/3/2023 1807)  Discharge to home or other facility with appropriate resources:   Identify barriers to discharge with patient and caregiver   Arrange for needed discharge resources and transportation as appropriate   Identify discharge learning needs (meds, wound care, etc)     Problem: Pain  Goal: Verbalizes/displays adequate comfort level or baseline comfort level  2/4/2023 0130 by Chiquita Mendenhall RN  Outcome: Progressing     Problem: Safety - Adult  Goal: Free from fall injury  2/4/2023 0130 by Chiquita Mendenhall RN  Outcome: Progressing  Flowsheets (Taken 2/3/2023 1944)  Free From Fall Injury: Instruct family/caregiver on patient safety     Problem: Skin/Tissue Integrity  Goal: Absence of new skin breakdown  Description: 1. Monitor for areas of redness and/or skin breakdown  2. Assess vascular access sites hourly  3. Every 4-6 hours minimum:  Change oxygen saturation probe site  4. Every 4-6 hours:  If on nasal continuous positive airway pressure, respiratory therapy assess nares and determine need for appliance change or resting period.   2/4/2023 0130 by Chiquita Mendenhall RN  Outcome: Progressing     Problem: Chronic Conditions and Co-morbidities  Goal: Patient's chronic conditions and co-morbidity symptoms are monitored and maintained or improved  2/4/2023 0130 by Chiquita Mendenhall RN  Outcome: Progressing  Flowsheets (Taken 2/3/2023 1807)  Care Plan - Patient's Chronic Conditions and Co-Morbidity Symptoms are Monitored and Maintained or Improved:   Monitor and assess patient's chronic conditions and comorbid symptoms for stability, deterioration, or improvement   Collaborate with multidisciplinary team to address chronic and comorbid conditions and prevent exacerbation or deterioration   Update acute care plan with appropriate goals if chronic or comorbid symptoms are exacerbated and prevent overall improvement and discharge     Problem: Neurosensory - Adult  Goal: Achieves stable or improved neurological status  Outcome: Progressing  Flowsheets (Taken 2/3/2023 1944)  Achieves stable or improved neurological status:   Assess for and report changes in neurological status   Initiate measures to prevent increased intracranial pressure

## 2023-02-04 NOTE — PROGRESS NOTES
Sent message to MD Geovanna Penn regarding husbands decision to take  home AMA. Son and Daughter in law are present as well. Explained no care outside of here is provided when leaving AMA and family is responsible for transporting patient from floor to home.

## 2023-02-04 NOTE — PROGRESS NOTES
Electrophysiology - PROGRESS NOTE    Admit Date: 1/25/2023     Chief Complaint: pAF     Interval History:   Patient seen and examined and notes reviewed. Patient is being followed for pAF. Patient had presented with complaints of sepsis and anemia. He also has an ongoing sacral decub ulcer. States he has been told he has had atrial fibrillation in the past.  Noted to be in ST/AT this am. No s/s. CT showed mod bilat pl effusions.     In: 792.7 [P.O.:680; I.V.:10]  Out: 1450    Wt Readings from Last 2 Encounters:   02/04/23 159 lb 2.8 oz (72.2 kg)   08/09/19 228 lb (103.4 kg)       Data:   Scheduled Meds:   Scheduled Meds:   apixaban  5 mg Oral BID    pantoprazole  40 mg Oral QAM AC    QUEtiapine  200 mg Oral Nightly    multivitamin  1 tablet Oral Daily    metoprolol succinate  25 mg Oral BID    zinc sulfate  50 mg Oral Daily    dilTIAZem  240 mg Oral Daily    furosemide  20 mg Oral Daily    collagenase   Topical Daily    doxepin  150 mg Oral Nightly    melatonin  3 mg Oral Nightly    Venelex   Topical BID    lactulose  10 g Oral TID    collagenase   Topical Daily    silver nitrate applicators   Topical Once    sodium chloride flush  5-40 mL IntraVENous 2 times per day     Continuous Infusions:   sodium chloride      sodium chloride 25 mL (02/03/23 1208)     PRN Meds:.magnesium sulfate, LORazepam, sodium chloride, prochlorperazine **OR** prochlorperazine, clonazePAM, perflutren lipid microspheres, sodium chloride flush, sodium chloride, polyethylene glycol, acetaminophen **OR** acetaminophen  Continuous Infusions:   sodium chloride      sodium chloride 25 mL (02/03/23 1208)       Intake/Output Summary (Last 24 hours) at 2/4/2023 0833  Last data filed at 2/4/2023 0600  Gross per 24 hour   Intake 690 ml   Output 1450 ml   Net -760 ml       CBC:   Lab Results   Component Value Date/Time    WBC 6.4 02/04/2023 05:04 AM    HGB 8.8 02/04/2023 05:04 AM     02/04/2023 05:04 AM     BMP:  Lab Results   Component Value Date/Time     02/04/2023 05:05 AM    K 3.4 02/04/2023 05:05 AM     02/04/2023 05:05 AM    CO2 27 02/04/2023 05:05 AM    BUN 12 02/04/2023 05:05 AM    CREATININE 0.8 02/04/2023 05:05 AM    GLUCOSE 80 02/04/2023 05:05 AM     INR:   Lab Results   Component Value Date/Time    INR 1.71 01/28/2023 06:40 AM    INR 1.82 01/27/2023 07:45 AM    INR 1.96 01/26/2023 03:58 AM        CARDIAC LABS  ENZYMES:No results for input(s): CKMB, CKMBINDEX, TROPONINI in the last 72 hours. Invalid input(s): CKTOTAL;3  FASTING LIPID PANEL:No results found for: HDL, LDLDIRECT, LDLCALC, TRIG, TSH  LIVER PROFILE:  Lab Results   Component Value Date/Time    AST 20 02/04/2023 05:05 AM    AST 24 02/02/2023 06:00 AM    ALT 20 02/04/2023 05:05 AM    ALT 24 02/02/2023 06:00 AM       -----------------------------------------------------------------  Telemetry: Personally reviewed  ST, NSR, AT/AF    Objective:   Vitals: /83   Pulse 74   Temp 97.7 °F (36.5 °C) (Oral)   Resp 15   Ht 6' (1.829 m)   Wt 159 lb 2.8 oz (72.2 kg)   SpO2 97%   BMI 21.59 kg/m²   General appearance: alert, appears stated age and cooperative, No acute distress   Eyes: Conjunctiva and pupils normal and reactive  Skin: Skin color, texture, turgor normal. No rashes or ecchymosis.   Neck: no JVD, supple, symmetrical, trachea midline   Lungs: , no accessory muscle use, no respiratory distress  Heart: tachy reg  Abdomen: soft, non-tender; bowel sounds normal  Extremities: No edema, DP +  Psychiatric: normal insight and affect    Patient Active Problem List:     Sepsis ([address]     Delirium     Septic shock ([address]     Decubitus ulcer of sacral region, stage 4 (HCC)     Atrial fibrillation with rapid ventricular response (HCC)     PAC (premature atrial contraction)     PVC (premature ventricular contraction)     Primary hypertension     Mixed hyperlipidemia        Assessment & Plan:      pAF  AT  ST  Chronic dCHF    59 y/o man with a h/o HTN, HLD, DM, OA, CKD, pancreatitis, s/p gastric bypass surgery, TIA/CVA (2003, 2004), PE on Eliquis, chronic dCHF, irreg HB, ? pAF, has followed with Dr. Regina Coronado [facility]), last seem 2015, who p/w AMS, anemia, sev sepsis, ALISON on CKD stage 3, acute liver failure, stage 4 sacral decub ulcer, noted to have AF/RVR. [id number] 4/5. TSH 2.92 (11.30.22). pAF  - In ST -  and up to the 120's this am - appears to be an AT/ST  - Questionable h/o AF - has had palps and irreg HB in the past  - On Eliquis 5 mg BID - no s/s bleeding - continue  - On dilt 240 mg QD, Toprol XL 25 mg BID  - Reviewed risk factors, pathophysiology, treatment options and lifestyle modification for atrial fibrillation: Blood pressure control, blood sugar control, healthy diet, minimal alcohol intake, no smoking, activity and exercise, manage stress sleep apnea evaluation and symptoms of a stroke.   - Keep K+ > 4.0 and Mg > 2.0 - replacement ordered  - Reviewed recent labs  - Dig x 1 today  - Keep on [address]

## 2023-02-04 NOTE — CARE COORDINATION
JEYSON met with Mr. Jessica Sullivan and his spouse, Cleo Lynn, at the bedside multiple times today to discuss plans for discharge. Mr. Jessica Sullivan is alert and oriented to self and is confused by the hospital situation. His spouse, Jennyfer Wisdom, stated he would like to take Mr. Jessica Sullivan home today. He stated the pt will be more comfortable and recover better at home. Explained leaving today would be against medical advice, per Dr. Loree Jewell. Explained Mr. Jessica Sullivan is at a high risk for an adverse event should he leave today without a thorough discharge plan of care with follow-up. Mr. Lilly Soriano expressed understanding and insisted he still wants to leave. There is no signed HCPOA paperwork available. Explained it is understood they are , but documentation of the union is needed to show he is legally next of kin and decision-maker. Mr. Lilly Soriano was able to produce the needed paperwork (marriage license). Updated Dr. Loree Jewell, the pt's primary RN, and the nursing supervisor Mr. Lilly Soriano may take the pt home today against medical advice given the availability of proper documentation. Mr. Lilly Soriano has 2 other family members available to assist with transportation home.     Denton Mcelroy RN  Case Management  [phone]

## 2023-02-06 LAB
EKG ATRIAL RATE: 105 BPM
EKG DIAGNOSIS: NORMAL
EKG P AXIS: 60 DEGREES
EKG P-R INTERVAL: 140 MS
EKG Q-T INTERVAL: 364 MS
EKG QRS DURATION: 90 MS
EKG QTC CALCULATION (BAZETT): 481 MS
EKG R AXIS: 96 DEGREES
EKG T AXIS: 75 DEGREES
EKG VENTRICULAR RATE: 105 BPM

## 2023-02-06 PROCEDURE — 93010 ELECTROCARDIOGRAM REPORT: CPT | Performed by: INTERNAL MEDICINE

## 2023-05-24 ENCOUNTER — HOSPITAL ENCOUNTER (EMERGENCY)
Age: 63
Discharge: LEFT BEFORE DISCHARGE | End: 2023-05-25
Attending: EMERGENCY MEDICINE
Payer: MEDICAID

## 2023-05-24 ENCOUNTER — APPOINTMENT (OUTPATIENT)
Dept: GENERAL RADIOLOGY | Age: 63
End: 2023-05-24
Payer: MEDICAID

## 2023-05-24 DIAGNOSIS — N30.01 ACUTE CYSTITIS WITH HEMATURIA: ICD-10-CM

## 2023-05-24 DIAGNOSIS — R41.82 ALTERED MENTAL STATUS, UNSPECIFIED ALTERED MENTAL STATUS TYPE: Primary | ICD-10-CM

## 2023-05-24 PROCEDURE — 99284 EMERGENCY DEPT VISIT MOD MDM: CPT

## 2023-05-24 PROCEDURE — 71046 X-RAY EXAM CHEST 2 VIEWS: CPT

## 2023-05-24 PROCEDURE — 96365 THER/PROPH/DIAG IV INF INIT: CPT

## 2023-05-24 PROCEDURE — 51701 INSERT BLADDER CATHETER: CPT

## 2023-05-24 ASSESSMENT — PAIN SCALES - GENERAL: PAINLEVEL_OUTOF10: 6

## 2023-05-24 ASSESSMENT — PAIN DESCRIPTION - LOCATION: LOCATION: BUTTOCKS;BACK

## 2023-05-24 ASSESSMENT — LIFESTYLE VARIABLES
HOW MANY STANDARD DRINKS CONTAINING ALCOHOL DO YOU HAVE ON A TYPICAL DAY: PATIENT DOES NOT DRINK
HOW OFTEN DO YOU HAVE A DRINK CONTAINING ALCOHOL: NEVER

## 2023-05-24 ASSESSMENT — PAIN - FUNCTIONAL ASSESSMENT: PAIN_FUNCTIONAL_ASSESSMENT: 0-10

## 2023-05-25 VITALS
SYSTOLIC BLOOD PRESSURE: 117 MMHG | HEART RATE: 98 BPM | WEIGHT: 159.9 LBS | RESPIRATION RATE: 16 BRPM | OXYGEN SATURATION: 100 % | BODY MASS INDEX: 24.23 KG/M2 | TEMPERATURE: 98.2 F | DIASTOLIC BLOOD PRESSURE: 65 MMHG | HEIGHT: 68 IN

## 2023-05-25 LAB
ANION GAP SERPL CALCULATED.3IONS-SCNC: 14 MMOL/L (ref 3–16)
BACTERIA URNS QL MICRO: ABNORMAL /HPF
BASOPHILS # BLD: 0 K/UL (ref 0–0.2)
BASOPHILS NFR BLD: 0.4 %
BILIRUB UR QL STRIP.AUTO: NEGATIVE
BUN SERPL-MCNC: 13 MG/DL (ref 7–20)
CALCIUM SERPL-MCNC: 7.6 MG/DL (ref 8.3–10.6)
CHLORIDE SERPL-SCNC: 113 MMOL/L (ref 99–110)
CLARITY UR: CLEAR
CO2 SERPL-SCNC: 21 MMOL/L (ref 21–32)
COLOR UR: YELLOW
CREAT SERPL-MCNC: 0.9 MG/DL (ref 0.8–1.3)
DEPRECATED RDW RBC AUTO: 19.7 % (ref 12.4–15.4)
EOSINOPHIL # BLD: 0 K/UL (ref 0–0.6)
EOSINOPHIL NFR BLD: 0.2 %
EPI CELLS #/AREA URNS HPF: ABNORMAL /HPF (ref 0–5)
GFR SERPLBLD CREATININE-BSD FMLA CKD-EPI: >60 ML/MIN/{1.73_M2}
GLUCOSE SERPL-MCNC: 71 MG/DL (ref 70–99)
GLUCOSE UR STRIP.AUTO-MCNC: NEGATIVE MG/DL
HCT VFR BLD AUTO: 28.4 % (ref 40.5–52.5)
HGB BLD-MCNC: 9.3 G/DL (ref 13.5–17.5)
HGB UR QL STRIP.AUTO: ABNORMAL
KETONES UR STRIP.AUTO-MCNC: NEGATIVE MG/DL
LACTATE BLDV-SCNC: 1.5 MMOL/L (ref 0.4–1.9)
LEUKOCYTE ESTERASE UR QL STRIP.AUTO: ABNORMAL
LYMPHOCYTES # BLD: 1.7 K/UL (ref 1–5.1)
LYMPHOCYTES NFR BLD: 21.5 %
MCH RBC QN AUTO: 30.9 PG (ref 26–34)
MCHC RBC AUTO-ENTMCNC: 32.7 G/DL (ref 31–36)
MCV RBC AUTO: 94.5 FL (ref 80–100)
MONOCYTES # BLD: 0.6 K/UL (ref 0–1.3)
MONOCYTES NFR BLD: 7.6 %
NEUTROPHILS # BLD: 5.6 K/UL (ref 1.7–7.7)
NEUTROPHILS NFR BLD: 70.3 %
NITRITE UR QL STRIP.AUTO: NEGATIVE
NT-PROBNP SERPL-MCNC: 5084 PG/ML (ref 0–124)
PH UR STRIP.AUTO: 6 [PH] (ref 5–8)
PLATELET # BLD AUTO: 418 K/UL (ref 135–450)
PMV BLD AUTO: 7.6 FL (ref 5–10.5)
POTASSIUM SERPL-SCNC: 3.7 MMOL/L (ref 3.5–5.1)
PROT UR STRIP.AUTO-MCNC: ABNORMAL MG/DL
RBC # BLD AUTO: 3.01 M/UL (ref 4.2–5.9)
RBC #/AREA URNS HPF: ABNORMAL /HPF (ref 0–4)
SODIUM SERPL-SCNC: 148 MMOL/L (ref 136–145)
SP GR UR STRIP.AUTO: 1.02 (ref 1–1.03)
TROPONIN, HIGH SENSITIVITY: 108 NG/L (ref 0–22)
TROPONIN, HIGH SENSITIVITY: 109 NG/L (ref 0–22)
UA COMPLETE W REFLEX CULTURE PNL UR: YES
UA DIPSTICK W REFLEX MICRO PNL UR: YES
URN SPEC COLLECT METH UR: ABNORMAL
UROBILINOGEN UR STRIP-ACNC: 0.2 E.U./DL
WBC # BLD AUTO: 8 K/UL (ref 4–11)
WBC #/AREA URNS HPF: ABNORMAL /HPF (ref 0–5)

## 2023-05-25 PROCEDURE — 96365 THER/PROPH/DIAG IV INF INIT: CPT

## 2023-05-25 PROCEDURE — 87106 FUNGI IDENTIFICATION YEAST: CPT

## 2023-05-25 PROCEDURE — 81001 URINALYSIS AUTO W/SCOPE: CPT

## 2023-05-25 PROCEDURE — 87086 URINE CULTURE/COLONY COUNT: CPT

## 2023-05-25 PROCEDURE — 85025 COMPLETE CBC W/AUTO DIFF WBC: CPT

## 2023-05-25 PROCEDURE — 87150 DNA/RNA AMPLIFIED PROBE: CPT

## 2023-05-25 PROCEDURE — 36415 COLL VENOUS BLD VENIPUNCTURE: CPT

## 2023-05-25 PROCEDURE — 83605 ASSAY OF LACTIC ACID: CPT

## 2023-05-25 PROCEDURE — 80048 BASIC METABOLIC PNL TOTAL CA: CPT

## 2023-05-25 PROCEDURE — 99284 EMERGENCY DEPT VISIT MOD MDM: CPT

## 2023-05-25 PROCEDURE — 6360000002 HC RX W HCPCS: Performed by: EMERGENCY MEDICINE

## 2023-05-25 PROCEDURE — 87186 SC STD MICRODIL/AGAR DIL: CPT

## 2023-05-25 PROCEDURE — 87040 BLOOD CULTURE FOR BACTERIA: CPT

## 2023-05-25 PROCEDURE — 84484 ASSAY OF TROPONIN QUANT: CPT

## 2023-05-25 PROCEDURE — 83880 ASSAY OF NATRIURETIC PEPTIDE: CPT

## 2023-05-25 RX ORDER — LEVOFLOXACIN 5 MG/ML
500 INJECTION, SOLUTION INTRAVENOUS ONCE
Status: COMPLETED | OUTPATIENT
Start: 2023-05-25 | End: 2023-05-25

## 2023-05-25 RX ORDER — LEVOFLOXACIN 500 MG/1
500 TABLET, FILM COATED ORAL DAILY
Qty: 6 TABLET | Refills: 0 | Status: SHIPPED | OUTPATIENT
Start: 2023-05-25 | End: 2023-05-31

## 2023-05-25 RX ADMIN — LEVOFLOXACIN 500 MG: 5 INJECTION, SOLUTION INTRAVENOUS at 05:36

## 2023-05-25 ASSESSMENT — ENCOUNTER SYMPTOMS
BACK PAIN: 0
SHORTNESS OF BREATH: 0
DIARRHEA: 0
EYE DISCHARGE: 0
EYE ITCHING: 0
SINUS PRESSURE: 0
CONSTIPATION: 0
RHINORRHEA: 0
ABDOMINAL PAIN: 0
NAUSEA: 0
SORE THROAT: 0
COLOR CHANGE: 0
CHEST TIGHTNESS: 0
CHOKING: 0

## 2023-05-25 NOTE — DISCHARGE INSTRUCTIONS
You have elected to sign out [address] Your symptoms are likely due to a urinary tract infection, causing you to interpret things inappropriately including thinking people are breaking into your house even though there is no evidence of this. We do recommend that you are admitted to the hospital to treat this but you have elected to sign out [address] Please continue the medications you are discharged from the hospital on and start also taking Levaquin once daily for a total of 7 days. Please follow-up with your primary care provider for further evaluation. Please feel free to return to the emergency department at any time.

## 2023-05-25 NOTE — ED PROVIDER NOTES
ED Attending Attestation Note     Date of evaluation: 5/24/2023    This patient was seen by the advance practice provider. I have seen and examined the patient, agree with the workup, evaluation, management and diagnosis. The care plan has been discussed. My assessment reveals patient presents for altered mental status. Patient called police stating that he felt that multiple people were breaking into his house and robbing him. When police and EMS arrived they found that there was no evidence of a break-in. Patient was brought to the emergency department due to this altered mental status. On arrival, patient is awake and alert but is convinced that people were trying to break into his house. Patient is hemodynamically stable. He is chronically ill with multiple decubitus ulcers but no evidence of acute infection to these. Patient was recently admitted to [facility] for questionable osteomyelitis of the spine but was felt to be only cellulitis of the lower extremities for which he is currently on antibiotics. The patient's  did arrive and confirmed that the patient thought someone was breaking to his house and this prompted him to call 911. Due to difficulty with IV access there was a delay in the retaining the patient's labs, but these appear to be at his baseline except for an elevated high-sensitivity troponin. Urinalysis does show evidence of urinary tract infection. In review of prior urine cultures, he has grown pansensitive E. coli. Patient was given a dose of Levaquin in the emergency department. I did recommend due to the hallucinations that the patient be admitted to the hospital, but the patient did refuse this. The patient does have capacity to make decisions and even though both myself as well as the patient's  did recommend he be admitted to the hospital patient did refuse. Patient will be signed out [address]   He will be placed on a course of
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
## 2023-05-26 LAB
BACTERIA BLD CULT ORG #2: NORMAL
BACTERIA UR CULT: ABNORMAL
ORGANISM: ABNORMAL
REPORT: NORMAL

## 2023-05-26 NOTE — ED NOTES
Attempt to call patient regarding positive blood culture and need for re-eval. Patient unavailable at this time, message left recommending to call ER back.       Lamine Thomas RN  05/26/23 [id number]

## 2023-05-28 LAB
BACTERIA BLD CULT: ABNORMAL
BACTERIA BLD CULT: ABNORMAL
ORGANISM: ABNORMAL
ORGANISM: ABNORMAL

## 2023-05-29 LAB — BACTERIA BLD CULT ORG #2: NORMAL

## 2023-05-30 NOTE — ED NOTES
The [address] [place] [address]  Department of Pharmacy  Notification of Positive Blood Culture Results    Notification received from nursing of positive blood culture results in 1 of 2 bottles. 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 ED staff attempted to contact patient with positive blood culture result but patient could not be reached at listed number as of 5/26/23 at 9:22 AM. A HIPAA compliant voicemail requesting callback to the ED was left for the patient. As no callback has yet been received, positive blood culture result was routed to patient's PCP (Dr. Justina Calabrese) for further follow-up. Additionally, a certified letter will be sent to the address documented in patient's chart at this time. Please call with questions.     Kandee Sacks, PharmD, [address] [facility]  Clinical Pharmacist - Emergency Dept  Wireless: [phone]  5/30/2023 1:06 PM

## (undated) DEVICE — FORCEPS BX L240CM DIA2.4MM L NDL RAD JAW 4 133340